# Patient Record
Sex: FEMALE | Race: WHITE | NOT HISPANIC OR LATINO | Employment: OTHER | URBAN - METROPOLITAN AREA
[De-identification: names, ages, dates, MRNs, and addresses within clinical notes are randomized per-mention and may not be internally consistent; named-entity substitution may affect disease eponyms.]

---

## 2017-02-23 ENCOUNTER — CONVERSION ENCOUNTER (OUTPATIENT)
Dept: MAMMOGRAPHY | Facility: HOSPITAL | Age: 72
End: 2017-02-23

## 2018-03-08 ENCOUNTER — CONVERSION ENCOUNTER (OUTPATIENT)
Dept: MAMMOGRAPHY | Facility: HOSPITAL | Age: 73
End: 2018-03-08

## 2019-03-26 ENCOUNTER — HOSPITAL ENCOUNTER (OUTPATIENT)
Dept: MAMMOGRAPHY | Facility: HOSPITAL | Age: 74
Discharge: HOME | End: 2019-03-26
Attending: NURSE PRACTITIONER

## 2022-05-07 ENCOUNTER — HOSPITAL ENCOUNTER (EMERGENCY)
Facility: HOSPITAL | Age: 77
Discharge: HOME | End: 2022-05-08
Attending: EMERGENCY MEDICINE | Admitting: EMERGENCY MEDICINE

## 2022-05-07 DIAGNOSIS — M25.551 RIGHT HIP PAIN: Primary | ICD-10-CM

## 2022-05-07 PROCEDURE — 99283 EMERGENCY DEPT VISIT LOW MDM: CPT

## 2022-05-08 ENCOUNTER — APPOINTMENT (OUTPATIENT)
Dept: GENERAL RADIOLOGY | Facility: HOSPITAL | Age: 77
End: 2022-05-08

## 2022-05-08 VITALS
OXYGEN SATURATION: 95 % | RESPIRATION RATE: 20 BRPM | HEART RATE: 99 BPM | HEIGHT: 59 IN | WEIGHT: 90.17 LBS | SYSTOLIC BLOOD PRESSURE: 122 MMHG | BODY MASS INDEX: 18.18 KG/M2 | DIASTOLIC BLOOD PRESSURE: 76 MMHG | TEMPERATURE: 98 F

## 2022-05-08 PROCEDURE — 73502 X-RAY EXAM HIP UNI 2-3 VIEWS: CPT

## 2022-05-08 PROCEDURE — 96372 THER/PROPH/DIAG INJ SC/IM: CPT

## 2022-05-08 PROCEDURE — 25010000002 METHYLPREDNISOLONE PER 125 MG: Performed by: NURSE PRACTITIONER

## 2022-05-08 RX ORDER — CYCLOBENZAPRINE HCL 10 MG
10 TABLET ORAL ONCE
Status: COMPLETED | OUTPATIENT
Start: 2022-05-08 | End: 2022-05-08

## 2022-05-08 RX ORDER — METHYLPREDNISOLONE SODIUM SUCCINATE 125 MG/2ML
125 INJECTION, POWDER, LYOPHILIZED, FOR SOLUTION INTRAMUSCULAR; INTRAVENOUS ONCE
Status: COMPLETED | OUTPATIENT
Start: 2022-05-08 | End: 2022-05-08

## 2022-05-08 RX ORDER — METHOCARBAMOL 500 MG/1
500 TABLET, FILM COATED ORAL 3 TIMES DAILY PRN
Qty: 15 TABLET | Refills: 0 | Status: ON HOLD | OUTPATIENT
Start: 2022-05-08 | End: 2023-01-10

## 2022-05-08 RX ORDER — ACETAMINOPHEN 325 MG/1
650 TABLET ORAL ONCE
Status: COMPLETED | OUTPATIENT
Start: 2022-05-08 | End: 2022-05-08

## 2022-05-08 RX ORDER — LIDOCAINE 50 MG/G
1 PATCH TOPICAL EVERY 24 HOURS
Qty: 15 EACH | Refills: 0 | Status: ON HOLD | OUTPATIENT
Start: 2022-05-08 | End: 2023-01-10

## 2022-05-08 RX ADMIN — METHYLPREDNISOLONE SODIUM SUCCINATE 125 MG: 125 INJECTION, POWDER, FOR SOLUTION INTRAMUSCULAR; INTRAVENOUS at 01:10

## 2022-05-08 RX ADMIN — ACETAMINOPHEN 650 MG: 325 TABLET ORAL at 01:10

## 2022-05-08 RX ADMIN — CYCLOBENZAPRINE 10 MG: 10 TABLET, FILM COATED ORAL at 01:10

## 2022-05-08 NOTE — ED PROVIDER NOTES
Time: 01:03 EDT  Arrived by: Vehicle  Chief Complaint: Right hip pain  History provided by: Patient and family member  History is limited by: N/A    History of Present Illness:  Patient is a 77 y.o. year old female that presents to the emergency department with right hip pain with no new injury      Hip Pain  Location:  Right  Quality:  Aching and throbbing  Severity:  Moderate  Onset quality:  Gradual  Duration:  2 weeks  Timing:  Constant  Progression:  Worsening  Chronicity:  New  Context:  Patient had left hip pain and was seen and given injections.  That pain seemed to ease up but now right is hurting  Relieved by:  Sitting or lying down  Worsened by:  Standing up or touching  Ineffective treatments:  Patient states she took some naproxen and Aleve but then her primary doctor told her that was an NSAID and she did not need to be taking  Associated symptoms: no abdominal pain, no chest pain, no fever, no myalgias, no nausea, no rash, no shortness of breath and no vomiting          Similar Symptoms Previously: Had left hip pain a few weeks ago  Recently seen: Patient was seen by PCP 2 weeks ago for left hip pain after moving a couch.  Was given a shot and put on Flexeril for a few days.  Seem to improve but now right hip is hurting      Patient Care Team  Primary Care Provider: Unknown    Past Medical History:     No Known Allergies  History reviewed. No pertinent past medical history.  History reviewed. No pertinent surgical history.  History reviewed. No pertinent family history.    Home Medications:  Prior to Admission medications    Not on File        Social History:   PT  has no history on file for tobacco use, alcohol use, and drug use.    Record Review:  I have reviewed the patient's records in [place].     Review of Systems  Review of Systems   Constitutional: Negative for fever.   Respiratory: Negative for shortness of breath.    Cardiovascular: Negative for chest pain.   Gastrointestinal: Negative for  "abdominal pain, nausea and vomiting.   Genitourinary: Negative for flank pain.   Musculoskeletal: Positive for arthralgias ( Right hip) and gait problem. Negative for back pain, joint swelling, myalgias and neck pain.   Skin: Negative for rash.   Neurological: Negative for weakness and numbness.   Hematological: Negative.    Psychiatric/Behavioral: Negative.         Physical Exam  /76 (BP Location: Left arm, Patient Position: Sitting)   Pulse 104   Temp 98 °F (36.7 °C) (Oral)   Resp 20   Ht 149.9 cm (59\")   Wt 40.9 kg (90 lb 2.7 oz)   SpO2 96%   BMI 18.21 kg/m²     Physical Exam  Vitals and nursing note reviewed.   Constitutional:       Appearance: Normal appearance.   HENT:      Head: Atraumatic.   Cardiovascular:      Pulses: Normal pulses.   Pulmonary:      Effort: Pulmonary effort is normal.   Abdominal:      Tenderness: There is no abdominal tenderness.   Musculoskeletal:         General: Tenderness ( Tenderness both anterior right hip and posterior right hip) present. No swelling.      Cervical back: Normal range of motion.      Comments: Full range of motion of right hip   Skin:     General: Skin is warm and dry.      Capillary Refill: Capillary refill takes less than 2 seconds.      Findings: No erythema or rash.   Neurological:      Mental Status: She is alert.      Sensory: No sensory deficit.      Motor: No weakness.      Gait: Gait abnormal ( Limping).   Psychiatric:         Mood and Affect: Mood normal.         Behavior: Behavior normal.          ED Course  /76 (BP Location: Left arm, Patient Position: Sitting)   Pulse 104   Temp 98 °F (36.7 °C) (Oral)   Resp 20   Ht 149.9 cm (59\")   Wt 40.9 kg (90 lb 2.7 oz)   SpO2 96%   BMI 18.21 kg/m²   No results found for this or any previous visit.  Medications   methylPREDNISolone sodium succinate (SOLU-Medrol) injection 125 mg (has no administration in time range)   cyclobenzaprine (FLEXERIL) tablet 10 mg (has no administration in time " range)   acetaminophen (TYLENOL) tablet 650 mg (has no administration in time range)     XR Hip With or Without Pelvis 2 - 3 View Right    Result Date: 5/8/2022  Narrative: PROCEDURE: XR HIP W OR WO PELVIS 2-3 VIEW RIGHT  COMPARISON: None.  INDICATIONS: right hip pain after lifting injury 1 week ago  FINDINGS: 3 views were obtained.  No acute fracture or acute malalignment is identified.  There is suspected generalized osteopenia.  There are pelvic vascular calcifications.  There may be mild degenerative changes of the bilateral hip joints.  If symptoms or clinical concerns persist, consider imaging follow-up.      Impression:  No acute fracture or acute malalignment is identified.    COMMENT:  Part of this note is an electronic transcription of spoken language to printed text. The electronic translation/transcription may permit erroneous, or at times, nonsensical (or even sensical) words or phrases to be inadvertently transcribed or omitted; this  has reviewed the note for such errors (as well as additional errors); however, some may still exist.  ADIS GRAVES JR, MD       Electronically Signed and Approved By: ADIS GRAVES JR, MD on 5/08/2022 at 0:32                Medical Decision Making:                     MDM  Number of Diagnoses or Management Options  Right hip pain  Diagnosis management comments: I have spoken with the patient. I have explained the patient´s condition, diagnoses and treatment plan based on the information available to me at this time. I have answered the patient's questions and addressed any concerns. The patient has a good  understanding of the patient´s diagnosis, condition, and treatment plan as can be expected at this point. The vital signs have been stable. The patient´s condition is stable and appropriate for discharge from the emergency department.      The patient will pursue further outpatient evaluation with the primary care physician or other designated or  consulting physician as outlined in the discharge instructions. They are agreeable to this plan of care and follow-up instructions have been explained in detail. The patient has received these instructions in written format and have expressed an understanding of the discharge instructions. The patient is aware that any significant change in condition or worsening of symptoms should prompt an immediate return to this or the closest emergency department or call to 911.         Amount and/or Complexity of Data Reviewed  Tests in the radiology section of CPT®: reviewed and ordered  Tests in the medicine section of CPT®: ordered and reviewed  Obtain history from someone other than the patient: yes (Family member at bedside)    Risk of Complications, Morbidity, and/or Mortality  Presenting problems: low  Diagnostic procedures: low  Management options: low    Patient Progress  Patient progress: stable       Final diagnoses:   Right hip pain        Disposition:  ED Disposition     ED Disposition   Discharge    Condition   Stable    Comment   --              Elisabeth Membreno, APRN  05/08/22 0103

## 2022-05-08 NOTE — DISCHARGE INSTRUCTIONS
Your x-rays were negative and only showed mild degenerative changes.  The pain in your right hip as we discussed is likely due for compensation due to recent left hip pain.    Rest.  Ice.  Take medication as prescribed.  Tylenol as needed for pain.    Use your cane for ambulation assistance.    Follow-up with your PCP or the orthopedic as referred above for further evaluation and any additional treatment or evaluation needed.    Return for new or worsening symptoms

## 2022-05-25 ENCOUNTER — TELEPHONE (OUTPATIENT)
Dept: ORTHOPEDIC SURGERY | Facility: CLINIC | Age: 77
End: 2022-05-25

## 2022-05-26 ENCOUNTER — TELEPHONE (OUTPATIENT)
Dept: ORTHOPEDIC SURGERY | Facility: CLINIC | Age: 77
End: 2022-05-26

## 2022-06-01 ENCOUNTER — TELEPHONE (OUTPATIENT)
Dept: ORTHOPEDIC SURGERY | Facility: CLINIC | Age: 77
End: 2022-06-01

## 2022-06-01 NOTE — TELEPHONE ENCOUNTER
Caller: PAUL MORALES    Relationship to patient: Emergency Contact    Best call back number:   Patient is needing: PLEASE CALL THE PATIENTS DAUGHTER IN REFERENCES TO HER MOTHERS REFERRAL. PATIENT IS IN PAIN. SHE IS WILLING TO SEE ANY DOCTOR AT THIS POINT.

## 2022-06-02 ENCOUNTER — TELEPHONE (OUTPATIENT)
Dept: ORTHOPEDIC SURGERY | Facility: CLINIC | Age: 77
End: 2022-06-02

## 2022-06-02 NOTE — TELEPHONE ENCOUNTER
Caller: PATIENT   Relationship to patient: SELF     Best call back number: [phone]    Patient is needing: PATIENT IS ASKING IF SHE COULD GET IN SOONER. SHE HAS A FRACTURE AND STATES SHE IS IN A LOT OF PAIN. SCHEDULED PER REFERRAL NOTES. ATTEMPTED TO WT BUT THERE WAS NO ANSWER.

## 2022-06-02 NOTE — TELEPHONE ENCOUNTER
DR GARLAND SAID OK. PT STATES SHE INJURED HER LEFT SIDE IN April MOVING A COUCH THEN THE RT SIDE STARTED HURTING. REFERRING PROVIDER IS WANTING SOONER APPT WILL DR CIFUENTES SEE?

## 2022-06-07 ENCOUNTER — OFFICE VISIT (OUTPATIENT)
Dept: ORTHOPEDIC SURGERY | Facility: CLINIC | Age: 77
End: 2022-06-07

## 2022-06-07 VITALS — HEART RATE: 109 BPM | WEIGHT: 90 LBS | OXYGEN SATURATION: 96 % | BODY MASS INDEX: 18.14 KG/M2 | HEIGHT: 59 IN

## 2022-06-07 DIAGNOSIS — S32.409A CLOSED NONDISPLACED FRACTURE OF ACETABULUM, UNSPECIFIED PORTION OF ACETABULUM, UNSPECIFIED LATERALITY, INITIAL ENCOUNTER: Primary | ICD-10-CM

## 2022-06-07 PROBLEM — S72.001A CLOSED FRACTURE OF RIGHT HIP: Status: ACTIVE | Noted: 2022-06-07

## 2022-06-07 PROCEDURE — 99203 OFFICE O/P NEW LOW 30 MIN: CPT | Performed by: ORTHOPAEDIC SURGERY

## 2022-06-07 RX ORDER — ATORVASTATIN CALCIUM 40 MG/1
40 TABLET, FILM COATED ORAL DAILY
COMMUNITY

## 2022-06-07 RX ORDER — BUSPIRONE HYDROCHLORIDE 10 MG/1
10 TABLET ORAL 3 TIMES DAILY
COMMUNITY
Start: 2022-05-19

## 2022-06-07 RX ORDER — ACETAMINOPHEN 500 MG
500 TABLET ORAL EVERY 6 HOURS PRN
COMMUNITY

## 2022-06-07 RX ORDER — LISINOPRIL 20 MG/1
20 TABLET ORAL DAILY
COMMUNITY
Start: 2022-06-03

## 2022-06-07 RX ORDER — METOPROLOL SUCCINATE 25 MG/1
25 TABLET, EXTENDED RELEASE ORAL DAILY
COMMUNITY
Start: 2022-05-19

## 2022-06-07 RX ORDER — CYCLOBENZAPRINE HCL 10 MG
10 TABLET ORAL NIGHTLY PRN
COMMUNITY
Start: 2022-05-05

## 2022-06-07 NOTE — PROGRESS NOTES
"Chief Complaint  Pain and Initial Evaluation of the Right Hip     Subjective      Sandra Sylvester presents to [facility] for evaluation of the right hip. She reports she was moving furniture and started having right hip pain. She has had x-rays and MRI of her hip. I reviewed those with her today. She has no other complaints. She has been trying to rest her hip.     No Known Allergies     Social History     Socioeconomic History   • Marital status:    Tobacco Use   • Smoking status: Current Every Day Smoker     Packs/day: 0.50     Types: Cigarettes   • Smokeless tobacco: Never Used        Review of Systems     Objective   Vital Signs:   Pulse 109   Ht 149.9 cm (59\")   Wt 40.8 kg (90 lb)   SpO2 96%   BMI 18.18 kg/m²       Physical Exam  Constitutional:       Appearance: Normal appearance. The patient is well-developed and normal weight.   HENT:      Head: Normocephalic.      Right Ear: Hearing and external ear normal.      Left Ear: Hearing and external ear normal.      Nose: Nose normal.   Eyes:      Conjunctiva/sclera: Conjunctivae normal.   Cardiovascular:      Rate and Rhythm: Normal rate.   Pulmonary:      Effort: Pulmonary effort is normal.      Breath sounds: No wheezing or rales.   Abdominal:      Palpations: Abdomen is soft.      Tenderness: There is no abdominal tenderness.   Musculoskeletal:      Cervical back: Normal range of motion.   Skin:     Findings: No rash.   Neurological:      Mental Status: The patient is alert and oriented to person, place, and time.   Psychiatric:         Mood and Affect: Mood and affect normal.         Judgment: Judgment normal.       Ortho Exam      Right hip-flexion 90. External Rotation 35. Internal rotation 45. Negative straight leg raise. Equal leg lengths. Positive EHL, FHL, GS and TA. Sensation intact to all 5 nerves of the foot. Positive pulses. Pain with hip ROM. No pain with hip ROM to the left hip. "     Procedures    [facility] MRI- IMPRESSION: Extensive abnormal marrow signal about the pelvis involving both iliac bones and both acetabula compatible with   diffuse bone marrow edema. Edema also extends into the left superior pubic ramus.  Extensive microtrabecular fracture as well as cortical fracture along the anterior margin of the right acetabulum. Cortical fracture   displaced up to 3 mm at the anterior weightbearing acetabulum with a moderate amount of periarticular edema.  Similar but less pronounced microtrabecular fracture of the left acetabulum and also along the medial margin of the left iliac bone   bordering the SI joint.  There is a small (2.2 cm) focus of increased T2 marrow signal within the posterior aspect of the subtrochanteric right femur could   represent a focus of stress change but no definitive fracture.  Degenerative disc disease and moderately advanced facet arthropathy lower lumbar spine.  Findings called and discussed with Marilou STAPLES at the time of dictation    Imaging Results (Most Recent)     None           Result Review :       No results found.           Assessment and Plan     Diagnoses and all orders for this visit:    1. Closed nondisplaced fracture of acetabulum, unspecified portion of acetabulum, Bilaterally, initial encounter ([facility]) (Primary)        Discussed the treatment plan with the patient.  I reviewed the MRI with the patient. I advised her for limited weight bearing. Plan to use her walker. Order for home health given today.     Call or return if worsening symptoms.    Follow Up     4-6 weeks with AP Pelvis X-rays      Patient was given instructions and counseling regarding her condition or for health maintenance advice. Please see specific information pulled into the AVS if appropriate.     Scribed for Rodríguez Everett MD by Bela Valentine.  06/07/22   10:44 EDT    I have personally performed the services described in this document as scribed by the  above individual and it is both accurate and complete. Rodríguez Everett MD 06/07/22

## 2022-06-09 ENCOUNTER — TELEPHONE (OUTPATIENT)
Dept: ORTHOPEDIC SURGERY | Facility: CLINIC | Age: 77
End: 2022-06-09

## 2022-06-09 DIAGNOSIS — S32.409A CLOSED NONDISPLACED FRACTURE OF ACETABULUM, UNSPECIFIED PORTION OF ACETABULUM, UNSPECIFIED LATERALITY, INITIAL ENCOUNTER: Primary | ICD-10-CM

## 2022-06-09 NOTE — TELEPHONE ENCOUNTER
Caller: PAUL  Relationship to Patient: SELF    Phone Number: [phone]  Reason for Call:PT DAUGHTER STATES THAT PT WOULD LIKE TO MOVE FORWARD WITH PHYSICAL THERAPY IN HER HOME. STATES THAT SHE WANTS TO DO THIS WITH [facility] AS THEY HAVE AN AT HOME PROGRAM. PLEASE ADVISE AT ABOVE PHONE NUMBER

## 2022-06-09 NOTE — TELEPHONE ENCOUNTER
Received call from Cristina at [facility] in [place]. Cristina states patient called her and told her she does not want anyone coming to her house for therapy. Called to clarify with the patient's daughter and they have now decided to do outpatient PT at [facility]. Orders faxed.

## 2022-08-01 ENCOUNTER — OFFICE VISIT (OUTPATIENT)
Dept: ORTHOPEDIC SURGERY | Facility: CLINIC | Age: 77
End: 2022-08-01

## 2022-08-01 VITALS — HEART RATE: 67 BPM | OXYGEN SATURATION: 95 % | BODY MASS INDEX: 18.14 KG/M2 | HEIGHT: 59 IN | WEIGHT: 90 LBS

## 2022-08-01 DIAGNOSIS — M25.551 RIGHT HIP PAIN: Primary | ICD-10-CM

## 2022-08-01 DIAGNOSIS — S32.409D CLOSED NONDISPLACED FRACTURE OF ACETABULUM WITH ROUTINE HEALING, UNSPECIFIED PORTION OF ACETABULUM, UNSPECIFIED LATERALITY, SUBSEQUENT ENCOUNTER: ICD-10-CM

## 2022-08-01 PROCEDURE — 99213 OFFICE O/P EST LOW 20 MIN: CPT | Performed by: PHYSICIAN ASSISTANT

## 2022-08-01 NOTE — PROGRESS NOTES
"Chief Complaint  Follow-up of the Right Hip    Subjective          Sandra Sylvester is a 77 y.o. female  presents to [facility] for   History of Present Illness      Patient presents with her daughter Melissa for follow-up evaluation of bilateral acetabular fracture, she was first seen by Dr. Everett on 6/7/2022, her original injury occurred sometime in April when she was moving furniture, started having right hip pain.  Prior to her seeing Dr. Everett she had x-rays and MRI of her hip.  Patient states that she has been doing physical therapy since last visit and protecting her weightbearing with using a walker.  Patient states that she is happy with her progress states the right hip pain has improved, left hip has also improved she states that it is expensive to go to physical therapy and she would like to continue home exercises.  She denies new injury or symptoms of pain.  Patient daughter agrees.      No Known Allergies     Social History     Socioeconomic History   • Marital status:    Tobacco Use   • Smoking status: Current Every Day Smoker     Packs/day: 0.50     Types: Cigarettes   • Smokeless tobacco: Never Used        REVIEW OF SYSTEMS    Constitutional: Denies fevers, chills, weight loss  Cardiovascular: Denies chest pain, shortness of breath  Skin: Denies rashes, acute skin changes  Neurologic: Denies headache, loss of consciousness  MSK: Bilateral hip pain      Objective   Vital Signs:   Pulse 67   Ht 149.9 cm (59\")   Wt 40.8 kg (90 lb)   SpO2 95%   BMI 18.18 kg/m²     Body mass index is 18.18 kg/m².    Physical Exam    Bilateral hip: Right hip: Flexion 90, external rotation 35, internal rotation 45, negative straight leg raise, equal leg lengths.  Left hip: Flexion 90, external rotation 35, internal rotation 45, no pain with range of motion.    Procedures    Imaging Results (Most Recent)     Procedure Component Value Units Date/Time    XR Pelvis 1 or 2 " View [[id number]] Resulted: 08/01/22 1532     Updated: 08/01/22 1533    Narrative:      • View:AP view(s)  • Site: Pelvis  • Indication: Pelvic pain  • Study: X-rays ordered, taken in the office, and reviewed today  • X-ray: Good healing of right and left acetabular fractures, fracture   alignment remains stable with callus formation,.  • Comparative data: No comparative data found             Result Review :   The following data was reviewed by: ELSIE Lim on 08/01/2022:  Data reviewed: Radiologic studies Reviewed by me with the patient             Assessment and Plan    Diagnoses and all orders for this visit:    1. Right hip pain (Primary)  -     Cancel: XR Hip With or Without Pelvis 2 - 3 View Right  -     XR Pelvis 1 or 2 View    2. Closed nondisplaced fracture of acetabulum with routine healing, unspecified portion of acetabulum, unspecified laterality, subsequent encounter        Reviewed x-rays with the patient and her daughter, advised them she should continue protective weightbearing with walker use, advised them to continue home exercises if they need to stop physical therapy, if any new or concerning symptoms occur follow-up sooner otherwise follow-up in 4 weeks with recheck with x-rays.    Call or return if worsening symptoms.    Follow Up   Return in about 4 weeks (around 8/29/2022) for Recheck.  Patient was given instructions and counseling regarding her condition or for health maintenance advice. Please see specific information pulled into the AVS if appropriate.

## 2022-09-01 ENCOUNTER — OFFICE VISIT (OUTPATIENT)
Dept: ORTHOPEDIC SURGERY | Facility: CLINIC | Age: 77
End: 2022-09-01

## 2022-09-01 VITALS — BODY MASS INDEX: 18.14 KG/M2 | HEIGHT: 59 IN | HEART RATE: 93 BPM | OXYGEN SATURATION: 93 % | WEIGHT: 90 LBS

## 2022-09-01 DIAGNOSIS — S32.409D CLOSED NONDISPLACED FRACTURE OF ACETABULUM WITH ROUTINE HEALING, UNSPECIFIED PORTION OF ACETABULUM, UNSPECIFIED LATERALITY, SUBSEQUENT ENCOUNTER: ICD-10-CM

## 2022-09-01 DIAGNOSIS — M25.551 RIGHT HIP PAIN: Primary | ICD-10-CM

## 2022-09-01 PROCEDURE — 99213 OFFICE O/P EST LOW 20 MIN: CPT | Performed by: ORTHOPAEDIC SURGERY

## 2022-09-01 NOTE — PROGRESS NOTES
"Chief Complaint  Follow-up of the Pelvis     Subjective      Sandra Sylvester presents to [facility] for follow-up evaluation of bilateral acetabular fracture, she was first seen by Dr. Everett on 6/7/2022, her original injury occurred sometime in April when she was moving furniture, started having right hip pain. She denies hip pain. She has back pain. She ambulates with a walker.     No Known Allergies     Social History     Socioeconomic History   • Marital status:    Tobacco Use   • Smoking status: Current Every Day Smoker     Packs/day: 0.50     Types: Cigarettes   • Smokeless tobacco: Never Used        Review of Systems     Objective   Vital Signs:   Pulse 93   Ht 149.9 cm (59\")   Wt 40.8 kg (90 lb)   SpO2 93%   BMI 18.18 kg/m²       Physical Exam  Constitutional:       Appearance: Normal appearance. The patient is well-developed and normal weight.   HENT:      Head: Normocephalic.      Right Ear: Hearing and external ear normal.      Left Ear: Hearing and external ear normal.      Nose: Nose normal.   Eyes:      Conjunctiva/sclera: Conjunctivae normal.   Cardiovascular:      Rate and Rhythm: Normal rate.   Pulmonary:      Effort: Pulmonary effort is normal.      Breath sounds: No wheezing or rales.   Abdominal:      Palpations: Abdomen is soft.      Tenderness: There is no abdominal tenderness.   Musculoskeletal:      Cervical back: Normal range of motion.   Skin:     Findings: No rash.   Neurological:      Mental Status: The patient is alert and oriented to person, place, and time.   Psychiatric:         Mood and Affect: Mood and affect normal.         Judgment: Judgment normal.       Ortho Exam      Pelvis- Right Hip flexion 95. External Rotation 45. Internal rotation 25. Neurovascularly intact. Positive EHL, FHL, GS and TA. Sensation intact to all 5 nerves of the foot. Positive pulses.     Procedures    X-Ray Report:  Pelvis X-Ray  Indication: Evaluation of " pelvis pain  AP view(s)  Findings: Healed right and left acetabular fractures, fracture   alignment remains stable with callus formation. Moderate to advanced degenerative changes of the right hip with mild protrusio of the femoral head. Moderate degenerative cahgnes of the left hip, osteopenia. No acute fracture  Prior studies available for comparison: yes         Imaging Results (Most Recent)     Procedure Component Value Units Date/Time    XR Pelvis 1 or 2 View [[id number]] Resulted: 09/01/22 1430     Updated: 09/01/22 1431           Result Review :       No results found.           Assessment and Plan     Diagnoses and all orders for this visit:    1. Right hip pain (Primary)  -     XR Pelvis 1 or 2 View    2. Closed nondisplaced fracture of acetabulum with routine healing, unspecified portion of acetabulum, unspecified laterality, subsequent encounter        Discussed the treatment plan with the patient.  I reviewed the x-rays that were obtained today with the patient. Plan to discontinue walker as tolerated. She can resume activity as tolerated.     Call or return if worsening symptoms.    Follow Up     PRN      Patient was given instructions and counseling regarding her condition or for health maintenance advice. Please see specific information pulled into the AVS if appropriate.     Scribed for Rodríguez Everett MD by Bela Valentine.  09/01/22   14:33 EDT    I have personally performed the services described in this document as scribed by the above individual and it is both accurate and complete. Rodríguez Everett MD 09/01/22

## 2023-01-01 ENCOUNTER — APPOINTMENT (OUTPATIENT)
Dept: GENERAL RADIOLOGY | Facility: HOSPITAL | Age: 78
DRG: 186 | End: 2023-01-01
Payer: MEDICARE

## 2023-01-01 ENCOUNTER — APPOINTMENT (OUTPATIENT)
Dept: CT IMAGING | Facility: HOSPITAL | Age: 78
DRG: 186 | End: 2023-01-01
Payer: MEDICARE

## 2023-01-01 ENCOUNTER — APPOINTMENT (OUTPATIENT)
Dept: CARDIOLOGY | Facility: HOSPITAL | Age: 78
DRG: 186 | End: 2023-01-01
Payer: MEDICARE

## 2023-01-01 ENCOUNTER — READMISSION MANAGEMENT (OUTPATIENT)
Dept: CALL CENTER | Facility: HOSPITAL | Age: 78
End: 2023-01-01
Payer: MEDICARE

## 2023-01-01 ENCOUNTER — HOSPITAL ENCOUNTER (INPATIENT)
Facility: HOSPITAL | Age: 78
LOS: 5 days | DRG: 186 | End: 2023-08-20
Attending: EMERGENCY MEDICINE | Admitting: INTERNAL MEDICINE
Payer: MEDICARE

## 2023-01-01 VITALS
TEMPERATURE: 97.6 F | OXYGEN SATURATION: 78 % | RESPIRATION RATE: 16 BRPM | HEIGHT: 59 IN | WEIGHT: 69.44 LBS | BODY MASS INDEX: 14 KG/M2

## 2023-01-01 DIAGNOSIS — R26.2 DIFFICULTY WALKING: ICD-10-CM

## 2023-01-01 DIAGNOSIS — J93.9 PNEUMOTHORAX, UNSPECIFIED TYPE: ICD-10-CM

## 2023-01-01 DIAGNOSIS — J90 PLEURAL EFFUSION: Primary | ICD-10-CM

## 2023-01-01 LAB
ALBUMIN FLD-MCNC: 1.3 G/DL
ALBUMIN SERPL-MCNC: 2.8 G/DL (ref 3.5–5.2)
ALBUMIN/GLOB SERPL: 1.1 G/DL
ALP SERPL-CCNC: 118 U/L (ref 39–117)
ALT SERPL W P-5'-P-CCNC: 10 U/L (ref 1–33)
ANION GAP SERPL CALCULATED.3IONS-SCNC: 11.3 MMOL/L (ref 5–15)
ANION GAP SERPL CALCULATED.3IONS-SCNC: 14.2 MMOL/L (ref 5–15)
ANION GAP SERPL CALCULATED.3IONS-SCNC: 7.7 MMOL/L (ref 5–15)
ANION GAP SERPL CALCULATED.3IONS-SCNC: 9.5 MMOL/L (ref 5–15)
ANISOCYTOSIS BLD QL: NORMAL
APPEARANCE FLD: ABNORMAL
APPEARANCE FLD: ABNORMAL
APTT PPP: 131.1 SECONDS (ref 78–95.9)
APTT PPP: 191.6 SECONDS (ref 78–95.9)
APTT PPP: 31.6 SECONDS (ref 78–95.9)
APTT PPP: 97.2 SECONDS (ref 78–95.9)
APTT PPP: >200 SECONDS (ref 78–95.9)
AST SERPL-CCNC: 22 U/L (ref 1–32)
B PARAPERT DNA SPEC QL NAA+PROBE: NOT DETECTED
B PERT DNA SPEC QL NAA+PROBE: NOT DETECTED
BACTERIA FLD CULT: NORMAL
BACTERIA FLD CULT: NORMAL
BASOPHILS # BLD AUTO: 0.02 10*3/MM3 (ref 0–0.2)
BASOPHILS # BLD AUTO: 0.04 10*3/MM3 (ref 0–0.2)
BASOPHILS # BLD AUTO: 0.04 10*3/MM3 (ref 0–0.2)
BASOPHILS # BLD AUTO: 0.06 10*3/MM3 (ref 0–0.2)
BASOPHILS NFR BLD AUTO: 0.3 % (ref 0–1.5)
BASOPHILS NFR BLD AUTO: 0.5 % (ref 0–1.5)
BASOPHILS NFR BLD AUTO: 0.7 % (ref 0–1.5)
BASOPHILS NFR BLD AUTO: 1.1 % (ref 0–1.5)
BH CV ECHO MEAS - AO MEAN PG: 1.63 MMHG
BH CV ECHO MEAS - AO ROOT DIAM: 3.3 CM
BH CV ECHO MEAS - AO V2 VTI: 15.1 CM
BH CV ECHO MEAS - AVA(I,D): 2.31 CM2
BH CV ECHO MEAS - EDV(CUBED): 20.8 ML
BH CV ECHO MEAS - EDV(MOD-SP2): 36.8 ML
BH CV ECHO MEAS - EDV(MOD-SP4): 44.9 ML
BH CV ECHO MEAS - EF(MOD-BP): 54.6 %
BH CV ECHO MEAS - EF(MOD-SP2): 51.4 %
BH CV ECHO MEAS - EF(MOD-SP4): 55 %
BH CV ECHO MEAS - ESV(CUBED): 7.6 ML
BH CV ECHO MEAS - ESV(MOD-SP2): 17.9 ML
BH CV ECHO MEAS - ESV(MOD-SP4): 20.2 ML
BH CV ECHO MEAS - FS: 28.7 %
BH CV ECHO MEAS - IVS/LVPW: 1.04 CM
BH CV ECHO MEAS - IVSD: 0.73 CM
BH CV ECHO MEAS - LA DIMENSION: 2.1 CM
BH CV ECHO MEAS - LAT PEAK E' VEL: 7.8 CM/SEC
BH CV ECHO MEAS - LV MASS(C)D: 44.4 GRAMS
BH CV ECHO MEAS - LV MAX PG: 2 MMHG
BH CV ECHO MEAS - LV MEAN PG: 0.95 MMHG
BH CV ECHO MEAS - LV V1 MAX: 70.7 CM/SEC
BH CV ECHO MEAS - LV V1 VTI: 11.4 CM
BH CV ECHO MEAS - LVIDD: 2.8 CM
BH CV ECHO MEAS - LVIDS: 1.96 CM
BH CV ECHO MEAS - LVOT AREA: 3.1 CM2
BH CV ECHO MEAS - LVOT DIAM: 1.97 CM
BH CV ECHO MEAS - LVPWD: 0.71 CM
BH CV ECHO MEAS - MED PEAK E' VEL: 3.9 CM/SEC
BH CV ECHO MEAS - MV A MAX VEL: 84.8 CM/SEC
BH CV ECHO MEAS - MV DEC TIME: 200 MSEC
BH CV ECHO MEAS - MV E MAX VEL: 59.6 CM/SEC
BH CV ECHO MEAS - MV E/A: 0.7
BH CV ECHO MEAS - RAP SYSTOLE: 3 MMHG
BH CV ECHO MEAS - RVDD: 2.07 CM
BH CV ECHO MEAS - RVSP: 42.5 MMHG
BH CV ECHO MEAS - SV(LVOT): 34.9 ML
BH CV ECHO MEAS - SV(MOD-SP2): 18.9 ML
BH CV ECHO MEAS - SV(MOD-SP4): 24.7 ML
BH CV ECHO MEAS - TAPSE (>1.6): 1.79 CM
BH CV ECHO MEAS - TR MAX PG: 39.5 MMHG
BH CV ECHO MEAS - TR MAX VEL: 314.1 CM/SEC
BH CV ECHO MEASUREMENTS AVERAGE E/E' RATIO: 10.19
BH CV UPPER ARTERIAL LEFT 1ST DIGIT SYS MAX: 0
BH CV UPPER ARTERIAL LEFT 2ND DIGIT SYS MAX: 0
BH CV UPPER ARTERIAL LEFT FBI 1ST DIGIT RATIO: 0
BH CV UPPER ARTERIAL LEFT FBI 2ND DIGIT RATIO: 0
BH CV UPPER ARTERIAL LEFT WBI RATIO: 0
BH CV UPPER ARTERIAL RIGHT 1ST DIGIT SYS MAX: 75
BH CV UPPER ARTERIAL RIGHT 2ND DIGIT SYS MAX: 66
BH CV UPPER ARTERIAL RIGHT FBI 1ST DIGIT RATIO: 0.97
BH CV UPPER ARTERIAL RIGHT FBI 2ND DIGIT RATIO: 0.86
BH CV UPPER ARTERIAL RIGHT WBI RATIO: 0.97
BILIRUB SERPL-MCNC: 0.7 MG/DL (ref 0–1.2)
BUN SERPL-MCNC: 23 MG/DL (ref 8–23)
BUN SERPL-MCNC: 24 MG/DL (ref 8–23)
BUN SERPL-MCNC: 27 MG/DL (ref 8–23)
BUN SERPL-MCNC: 27 MG/DL (ref 8–23)
BUN/CREAT SERPL: 34.8 (ref 7–25)
BUN/CREAT SERPL: 34.8 (ref 7–25)
BUN/CREAT SERPL: 36.5 (ref 7–25)
BUN/CREAT SERPL: 37 (ref 7–25)
C PNEUM DNA NPH QL NAA+NON-PROBE: NOT DETECTED
CALCIUM SPEC-SCNC: 10.3 MG/DL (ref 8.6–10.5)
CALCIUM SPEC-SCNC: 10.4 MG/DL (ref 8.6–10.5)
CALCIUM SPEC-SCNC: 10.4 MG/DL (ref 8.6–10.5)
CALCIUM SPEC-SCNC: 9.7 MG/DL (ref 8.6–10.5)
CHLORIDE SERPL-SCNC: 101 MMOL/L (ref 98–107)
CHLORIDE SERPL-SCNC: 101 MMOL/L (ref 98–107)
CHLORIDE SERPL-SCNC: 108 MMOL/L (ref 98–107)
CHLORIDE SERPL-SCNC: 93 MMOL/L (ref 98–107)
CHOLESTEROL FLUID: 52 MG/DL
CO2 SERPL-SCNC: 21.7 MMOL/L (ref 22–29)
CO2 SERPL-SCNC: 23.3 MMOL/L (ref 22–29)
CO2 SERPL-SCNC: 26.5 MMOL/L (ref 22–29)
CO2 SERPL-SCNC: 26.8 MMOL/L (ref 22–29)
COLOR FLD: ABNORMAL
COLOR FLD: ABNORMAL
CREAT SERPL-MCNC: 0.66 MG/DL (ref 0.57–1)
CREAT SERPL-MCNC: 0.69 MG/DL (ref 0.57–1)
CREAT SERPL-MCNC: 0.73 MG/DL (ref 0.57–1)
CREAT SERPL-MCNC: 0.74 MG/DL (ref 0.57–1)
D-LACTATE SERPL-SCNC: 3 MMOL/L (ref 0.5–2)
D-LACTATE SERPL-SCNC: 3 MMOL/L (ref 0.5–2)
D-LACTATE SERPL-SCNC: 3.2 MMOL/L (ref 0.5–2)
D-LACTATE SERPL-SCNC: 3.5 MMOL/L (ref 0.5–2)
D-LACTATE SERPL-SCNC: 3.7 MMOL/L (ref 0.5–2)
D-LACTATE SERPL-SCNC: 3.8 MMOL/L (ref 0.5–2)
DEPRECATED RDW RBC AUTO: 59.5 FL (ref 37–54)
DEPRECATED RDW RBC AUTO: 61.9 FL (ref 37–54)
DEPRECATED RDW RBC AUTO: 62.1 FL (ref 37–54)
DEPRECATED RDW RBC AUTO: 68.4 FL (ref 37–54)
EGFRCR SERPLBLD CKD-EPI 2021: 81.9 ML/MIN/1.73
EGFRCR SERPLBLD CKD-EPI 2021: 83.3 ML/MIN/1.73
EGFRCR SERPLBLD CKD-EPI 2021: 87.9 ML/MIN/1.73
EGFRCR SERPLBLD CKD-EPI 2021: 88.8 ML/MIN/1.73
EOSINOPHIL # BLD AUTO: 0.03 10*3/MM3 (ref 0–0.4)
EOSINOPHIL # BLD AUTO: 0.12 10*3/MM3 (ref 0–0.4)
EOSINOPHIL # BLD AUTO: 0.18 10*3/MM3 (ref 0–0.4)
EOSINOPHIL # BLD AUTO: 0.29 10*3/MM3 (ref 0–0.4)
EOSINOPHIL NFR BLD AUTO: 0.4 % (ref 0.3–6.2)
EOSINOPHIL NFR BLD AUTO: 1.5 % (ref 0.3–6.2)
EOSINOPHIL NFR BLD AUTO: 3.3 % (ref 0.3–6.2)
EOSINOPHIL NFR BLD AUTO: 5.1 % (ref 0.3–6.2)
ERYTHROCYTE [DISTWIDTH] IN BLOOD BY AUTOMATED COUNT: 16.9 % (ref 12.3–15.4)
ERYTHROCYTE [DISTWIDTH] IN BLOOD BY AUTOMATED COUNT: 17.1 % (ref 12.3–15.4)
ERYTHROCYTE [DISTWIDTH] IN BLOOD BY AUTOMATED COUNT: 17.1 % (ref 12.3–15.4)
ERYTHROCYTE [DISTWIDTH] IN BLOOD BY AUTOMATED COUNT: 17.2 % (ref 12.3–15.4)
FLUAV SUBTYP SPEC NAA+PROBE: NOT DETECTED
FLUBV RNA ISLT QL NAA+PROBE: NOT DETECTED
GLOBULIN UR ELPH-MCNC: 2.6 GM/DL
GLUCOSE FLD-MCNC: 107 MG/DL
GLUCOSE SERPL-MCNC: 103 MG/DL (ref 65–99)
GLUCOSE SERPL-MCNC: 77 MG/DL (ref 65–99)
GLUCOSE SERPL-MCNC: 83 MG/DL (ref 65–99)
GLUCOSE SERPL-MCNC: 93 MG/DL (ref 65–99)
GRAM STN SPEC: NORMAL
HADV DNA SPEC NAA+PROBE: NOT DETECTED
HCOV 229E RNA SPEC QL NAA+PROBE: NOT DETECTED
HCOV HKU1 RNA SPEC QL NAA+PROBE: NOT DETECTED
HCOV NL63 RNA SPEC QL NAA+PROBE: NOT DETECTED
HCOV OC43 RNA SPEC QL NAA+PROBE: NOT DETECTED
HCT VFR BLD AUTO: 38 % (ref 34–46.6)
HCT VFR BLD AUTO: 38.7 % (ref 34–46.6)
HCT VFR BLD AUTO: 40 % (ref 34–46.6)
HCT VFR BLD AUTO: 42.6 % (ref 34–46.6)
HGB BLD-MCNC: 11.6 G/DL (ref 12–15.9)
HGB BLD-MCNC: 12.3 G/DL (ref 12–15.9)
HGB BLD-MCNC: 12.4 G/DL (ref 12–15.9)
HGB BLD-MCNC: 13.1 G/DL (ref 12–15.9)
HMPV RNA NPH QL NAA+NON-PROBE: NOT DETECTED
HOLD SPECIMEN: NORMAL
HOLD SPECIMEN: NORMAL
HPIV1 RNA ISLT QL NAA+PROBE: NOT DETECTED
HPIV2 RNA SPEC QL NAA+PROBE: NOT DETECTED
HPIV3 RNA NPH QL NAA+PROBE: NOT DETECTED
HPIV4 P GENE NPH QL NAA+PROBE: NOT DETECTED
IMM GRANULOCYTES # BLD AUTO: 0.01 10*3/MM3 (ref 0–0.05)
IMM GRANULOCYTES # BLD AUTO: 0.02 10*3/MM3 (ref 0–0.05)
IMM GRANULOCYTES # BLD AUTO: 0.03 10*3/MM3 (ref 0–0.05)
IMM GRANULOCYTES # BLD AUTO: 0.04 10*3/MM3 (ref 0–0.05)
IMM GRANULOCYTES NFR BLD AUTO: 0.2 % (ref 0–0.5)
IMM GRANULOCYTES NFR BLD AUTO: 0.2 % (ref 0–0.5)
IMM GRANULOCYTES NFR BLD AUTO: 0.5 % (ref 0–0.5)
IMM GRANULOCYTES NFR BLD AUTO: 0.6 % (ref 0–0.5)
INR PPP: 1.25 (ref 0.86–1.15)
LDH FLD-CCNC: 363 U/L
LDH FLD-CCNC: 406 U/L
LDH SERPL-CCNC: 569 U/L (ref 135–214)
LEFT ATRIUM VOLUME INDEX: 12.2 ML/M2
LYMPHOCYTES # BLD AUTO: 0.35 10*3/MM3 (ref 0.7–3.1)
LYMPHOCYTES # BLD AUTO: 0.41 10*3/MM3 (ref 0.7–3.1)
LYMPHOCYTES # BLD AUTO: 0.43 10*3/MM3 (ref 0.7–3.1)
LYMPHOCYTES # BLD AUTO: 0.49 10*3/MM3 (ref 0.7–3.1)
LYMPHOCYTES NFR BLD AUTO: 5.2 % (ref 19.6–45.3)
LYMPHOCYTES NFR BLD AUTO: 6.5 % (ref 19.6–45.3)
LYMPHOCYTES NFR BLD AUTO: 7.1 % (ref 19.6–45.3)
LYMPHOCYTES NFR BLD AUTO: 7.2 % (ref 19.6–45.3)
LYMPHOCYTES NFR FLD MANUAL: 64 %
LYMPHOCYTES NFR FLD MANUAL: 89 %
Lab: NORMAL
M PNEUMO IGG SER IA-ACNC: NOT DETECTED
MACROCYTES BLD QL SMEAR: NORMAL
MACROPHAGE FLUID: 9 %
MCH RBC QN AUTO: 30.7 PG (ref 26.6–33)
MCH RBC QN AUTO: 31.8 PG (ref 26.6–33)
MCH RBC QN AUTO: 31.8 PG (ref 26.6–33)
MCH RBC QN AUTO: 31.9 PG (ref 26.6–33)
MCHC RBC AUTO-ENTMCNC: 29.1 G/DL (ref 31.5–35.7)
MCHC RBC AUTO-ENTMCNC: 30.5 G/DL (ref 31.5–35.7)
MCHC RBC AUTO-ENTMCNC: 31.8 G/DL (ref 31.5–35.7)
MCHC RBC AUTO-ENTMCNC: 32.8 G/DL (ref 31.5–35.7)
MCV RBC AUTO: 100.3 FL (ref 79–97)
MCV RBC AUTO: 100.5 FL (ref 79–97)
MCV RBC AUTO: 109.2 FL (ref 79–97)
MCV RBC AUTO: 97.1 FL (ref 79–97)
MESOTHL CELL NFR FLD MANUAL: 2 %
MONOCYTES # BLD AUTO: 0.7 10*3/MM3 (ref 0.1–0.9)
MONOCYTES # BLD AUTO: 0.74 10*3/MM3 (ref 0.1–0.9)
MONOCYTES # BLD AUTO: 0.87 10*3/MM3 (ref 0.1–0.9)
MONOCYTES # BLD AUTO: 0.87 10*3/MM3 (ref 0.1–0.9)
MONOCYTES NFR BLD AUTO: 10.6 % (ref 5–12)
MONOCYTES NFR BLD AUTO: 12.3 % (ref 5–12)
MONOCYTES NFR BLD AUTO: 12.6 % (ref 5–12)
MONOCYTES NFR BLD AUTO: 13.7 % (ref 5–12)
MONOCYTES NFR FLD: 19 %
NEUTROPHILS NFR BLD AUTO: 4.07 10*3/MM3 (ref 1.7–7)
NEUTROPHILS NFR BLD AUTO: 4.22 10*3/MM3 (ref 1.7–7)
NEUTROPHILS NFR BLD AUTO: 5.43 10*3/MM3 (ref 1.7–7)
NEUTROPHILS NFR BLD AUTO: 6.73 10*3/MM3 (ref 1.7–7)
NEUTROPHILS NFR BLD AUTO: 73.8 % (ref 42.7–76)
NEUTROPHILS NFR BLD AUTO: 75.6 % (ref 42.7–76)
NEUTROPHILS NFR BLD AUTO: 79 % (ref 42.7–76)
NEUTROPHILS NFR BLD AUTO: 82 % (ref 42.7–76)
NEUTROPHILS NFR FLD MANUAL: 8 %
NEUTROPHILS NFR FLD MANUAL: 9 %
NRBC BLD AUTO-RTO: 0 /100 WBC (ref 0–0.2)
NT-PROBNP SERPL-MCNC: 913.9 PG/ML (ref 0–1800)
NUC CELL # FLD: 333 /MM3
NUC CELL # FLD: 830 /MM3
PH FLD: 8 [PH]
PLATELET # BLD AUTO: 252 10*3/MM3 (ref 140–450)
PLATELET # BLD AUTO: 276 10*3/MM3 (ref 140–450)
PLATELET # BLD AUTO: 298 10*3/MM3 (ref 140–450)
PLATELET # BLD AUTO: 304 10*3/MM3 (ref 140–450)
PMV BLD AUTO: 9.5 FL (ref 6–12)
PMV BLD AUTO: 9.6 FL (ref 6–12)
PMV BLD AUTO: 9.6 FL (ref 6–12)
PMV BLD AUTO: 9.8 FL (ref 6–12)
POTASSIUM SERPL-SCNC: 4.3 MMOL/L (ref 3.5–5.2)
POTASSIUM SERPL-SCNC: 4.4 MMOL/L (ref 3.5–5.2)
POTASSIUM SERPL-SCNC: 4.7 MMOL/L (ref 3.5–5.2)
POTASSIUM SERPL-SCNC: 5.2 MMOL/L (ref 3.5–5.2)
PROT FLD-MCNC: 2.3 G/DL
PROT SERPL-MCNC: 4.8 G/DL (ref 6–8.5)
PROT SERPL-MCNC: 5.4 G/DL (ref 6–8.5)
PROTHROMBIN TIME: 15.8 SECONDS (ref 11.8–14.9)
QT INTERVAL: 303 MS
RBC # BLD AUTO: 3.78 10*6/MM3 (ref 3.77–5.28)
RBC # BLD AUTO: 3.86 10*6/MM3 (ref 3.77–5.28)
RBC # BLD AUTO: 3.9 10*6/MM3 (ref 3.77–5.28)
RBC # BLD AUTO: 4.12 10*6/MM3 (ref 3.77–5.28)
RBC # FLD AUTO: 3000 /MM3
RBC # FLD AUTO: 6000 /MM3
REF LAB TEST METHOD: NORMAL
RHINOVIRUS RNA SPEC NAA+PROBE: NOT DETECTED
RSV RNA NPH QL NAA+NON-PROBE: NOT DETECTED
SARS-COV-2 RNA RESP QL NAA+PROBE: NOT DETECTED
SMALL PLATELETS BLD QL SMEAR: ADEQUATE
SODIUM SERPL-SCNC: 134 MMOL/L (ref 136–145)
SODIUM SERPL-SCNC: 134 MMOL/L (ref 136–145)
SODIUM SERPL-SCNC: 137 MMOL/L (ref 136–145)
SODIUM SERPL-SCNC: 139 MMOL/L (ref 136–145)
TRIGL FLD-MCNC: 309 MG/DL
TROPONIN T SERPL HS-MCNC: 28 NG/L
UPPER ARTERIAL LEFT ARM BRACHIAL SYS MAX: 77 MMHG
UPPER ARTERIAL LEFT ARM RADIAL SYS MAX: 0 MMHG
UPPER ARTERIAL RIGHT ARM BRACHIAL SYS MAX: 73 MMHG
UPPER ARTERIAL RIGHT ARM RADIAL SYS MAX: 75 MMHG
WBC MORPH BLD: NORMAL
WBC NRBC COR # BLD: 5.39 10*3/MM3 (ref 3.4–10.8)
WBC NRBC COR # BLD: 5.71 10*3/MM3 (ref 3.4–10.8)
WBC NRBC COR # BLD: 6.88 10*3/MM3 (ref 3.4–10.8)
WBC NRBC COR # BLD: 8.21 10*3/MM3 (ref 3.4–10.8)
WHOLE BLOOD HOLD COAG: NORMAL
WHOLE BLOOD HOLD SPECIMEN: NORMAL

## 2023-01-01 PROCEDURE — 71045 X-RAY EXAM CHEST 1 VIEW: CPT

## 2023-01-01 PROCEDURE — 84157 ASSAY OF PROTEIN OTHER: CPT | Performed by: STUDENT IN AN ORGANIZED HEALTH CARE EDUCATION/TRAINING PROGRAM

## 2023-01-01 PROCEDURE — 63710000001 PREDNISONE PER 1 MG: Performed by: STUDENT IN AN ORGANIZED HEALTH CARE EDUCATION/TRAINING PROGRAM

## 2023-01-01 PROCEDURE — 87070 CULTURE OTHR SPECIMN AEROBIC: CPT | Performed by: STUDENT IN AN ORGANIZED HEALTH CARE EDUCATION/TRAINING PROGRAM

## 2023-01-01 PROCEDURE — 25010000002 ENOXAPARIN PER 10 MG: Performed by: INTERNAL MEDICINE

## 2023-01-01 PROCEDURE — 85730 THROMBOPLASTIN TIME PARTIAL: CPT | Performed by: INTERNAL MEDICINE

## 2023-01-01 PROCEDURE — 0 CEFEPIME PER 500 MG: Performed by: STUDENT IN AN ORGANIZED HEALTH CARE EDUCATION/TRAINING PROGRAM

## 2023-01-01 PROCEDURE — 87205 SMEAR GRAM STAIN: CPT | Performed by: STUDENT IN AN ORGANIZED HEALTH CARE EDUCATION/TRAINING PROGRAM

## 2023-01-01 PROCEDURE — 88184 FLOWCYTOMETRY/ TC 1 MARKER: CPT

## 2023-01-01 PROCEDURE — 93306 TTE W/DOPPLER COMPLETE: CPT

## 2023-01-01 PROCEDURE — 25010000002 SODIUM CHLORIDE 0.9 % WITH KCL 20 MEQ 20-0.9 MEQ/L-% SOLUTION: Performed by: INTERNAL MEDICINE

## 2023-01-01 PROCEDURE — 99233 SBSQ HOSP IP/OBS HIGH 50: CPT | Performed by: INTERNAL MEDICINE

## 2023-01-01 PROCEDURE — 99233 SBSQ HOSP IP/OBS HIGH 50: CPT | Performed by: STUDENT IN AN ORGANIZED HEALTH CARE EDUCATION/TRAINING PROGRAM

## 2023-01-01 PROCEDURE — 84478 ASSAY OF TRIGLYCERIDES: CPT | Performed by: STUDENT IN AN ORGANIZED HEALTH CARE EDUCATION/TRAINING PROGRAM

## 2023-01-01 PROCEDURE — 82042 OTHER SOURCE ALBUMIN QUAN EA: CPT | Performed by: STUDENT IN AN ORGANIZED HEALTH CARE EDUCATION/TRAINING PROGRAM

## 2023-01-01 PROCEDURE — 99223 1ST HOSP IP/OBS HIGH 75: CPT | Performed by: STUDENT IN AN ORGANIZED HEALTH CARE EDUCATION/TRAINING PROGRAM

## 2023-01-01 PROCEDURE — 87206 SMEAR FLUORESCENT/ACID STAI: CPT | Performed by: STUDENT IN AN ORGANIZED HEALTH CARE EDUCATION/TRAINING PROGRAM

## 2023-01-01 PROCEDURE — 83605 ASSAY OF LACTIC ACID: CPT | Performed by: STUDENT IN AN ORGANIZED HEALTH CARE EDUCATION/TRAINING PROGRAM

## 2023-01-01 PROCEDURE — 83986 ASSAY PH BODY FLUID NOS: CPT | Performed by: STUDENT IN AN ORGANIZED HEALTH CARE EDUCATION/TRAINING PROGRAM

## 2023-01-01 PROCEDURE — 89051 BODY FLUID CELL COUNT: CPT | Performed by: STUDENT IN AN ORGANIZED HEALTH CARE EDUCATION/TRAINING PROGRAM

## 2023-01-01 PROCEDURE — 87040 BLOOD CULTURE FOR BACTERIA: CPT | Performed by: STUDENT IN AN ORGANIZED HEALTH CARE EDUCATION/TRAINING PROGRAM

## 2023-01-01 PROCEDURE — 32555 ASPIRATE PLEURA W/ IMAGING: CPT | Performed by: STUDENT IN AN ORGANIZED HEALTH CARE EDUCATION/TRAINING PROGRAM

## 2023-01-01 PROCEDURE — 87102 FUNGUS ISOLATION CULTURE: CPT | Performed by: STUDENT IN AN ORGANIZED HEALTH CARE EDUCATION/TRAINING PROGRAM

## 2023-01-01 PROCEDURE — 83615 LACTATE (LD) (LDH) ENZYME: CPT | Performed by: STUDENT IN AN ORGANIZED HEALTH CARE EDUCATION/TRAINING PROGRAM

## 2023-01-01 PROCEDURE — 87116 MYCOBACTERIA CULTURE: CPT | Performed by: STUDENT IN AN ORGANIZED HEALTH CARE EDUCATION/TRAINING PROGRAM

## 2023-01-01 PROCEDURE — 94799 UNLISTED PULMONARY SVC/PX: CPT

## 2023-01-01 PROCEDURE — 76604 US EXAM CHEST: CPT | Performed by: STUDENT IN AN ORGANIZED HEALTH CARE EDUCATION/TRAINING PROGRAM

## 2023-01-01 PROCEDURE — 84311 SPECTROPHOTOMETRY: CPT | Performed by: STUDENT IN AN ORGANIZED HEALTH CARE EDUCATION/TRAINING PROGRAM

## 2023-01-01 PROCEDURE — 83880 ASSAY OF NATRIURETIC PEPTIDE: CPT

## 2023-01-01 PROCEDURE — 93010 ELECTROCARDIOGRAM REPORT: CPT | Performed by: INTERNAL MEDICINE

## 2023-01-01 PROCEDURE — 88305 TISSUE EXAM BY PATHOLOGIST: CPT | Performed by: STUDENT IN AN ORGANIZED HEALTH CARE EDUCATION/TRAINING PROGRAM

## 2023-01-01 PROCEDURE — 97165 OT EVAL LOW COMPLEX 30 MIN: CPT

## 2023-01-01 PROCEDURE — 99232 SBSQ HOSP IP/OBS MODERATE 35: CPT | Performed by: SURGERY

## 2023-01-01 PROCEDURE — 80048 BASIC METABOLIC PNL TOTAL CA: CPT | Performed by: INTERNAL MEDICINE

## 2023-01-01 PROCEDURE — 73206 CT ANGIO UPR EXTRM W/O&W/DYE: CPT

## 2023-01-01 PROCEDURE — 71046 X-RAY EXAM CHEST 2 VIEWS: CPT

## 2023-01-01 PROCEDURE — 87075 CULTR BACTERIA EXCEPT BLOOD: CPT | Performed by: STUDENT IN AN ORGANIZED HEALTH CARE EDUCATION/TRAINING PROGRAM

## 2023-01-01 PROCEDURE — 25010000002 FUROSEMIDE PER 20 MG: Performed by: INTERNAL MEDICINE

## 2023-01-01 PROCEDURE — 93923 UPR/LXTR ART STDY 3+ LVLS: CPT | Performed by: SURGERY

## 2023-01-01 PROCEDURE — 0202U NFCT DS 22 TRGT SARS-COV-2: CPT | Performed by: STUDENT IN AN ORGANIZED HEALTH CARE EDUCATION/TRAINING PROGRAM

## 2023-01-01 PROCEDURE — 84484 ASSAY OF TROPONIN QUANT: CPT

## 2023-01-01 PROCEDURE — 36415 COLL VENOUS BLD VENIPUNCTURE: CPT

## 2023-01-01 PROCEDURE — 88108 CYTOPATH CONCENTRATE TECH: CPT | Performed by: STUDENT IN AN ORGANIZED HEALTH CARE EDUCATION/TRAINING PROGRAM

## 2023-01-01 PROCEDURE — 32551 INSERTION OF CHEST TUBE: CPT | Performed by: STUDENT IN AN ORGANIZED HEALTH CARE EDUCATION/TRAINING PROGRAM

## 2023-01-01 PROCEDURE — 25010000002 OCTREOTIDE PER 25 MCG: Performed by: STUDENT IN AN ORGANIZED HEALTH CARE EDUCATION/TRAINING PROGRAM

## 2023-01-01 PROCEDURE — 97161 PT EVAL LOW COMPLEX 20 MIN: CPT

## 2023-01-01 PROCEDURE — 93923 UPR/LXTR ART STDY 3+ LVLS: CPT

## 2023-01-01 PROCEDURE — 25010000002 MORPHINE PER 10 MG: Performed by: EMERGENCY MEDICINE

## 2023-01-01 PROCEDURE — 85007 BL SMEAR W/DIFF WBC COUNT: CPT | Performed by: INTERNAL MEDICINE

## 2023-01-01 PROCEDURE — 94664 DEMO&/EVAL PT USE INHALER: CPT

## 2023-01-01 PROCEDURE — 93005 ELECTROCARDIOGRAM TRACING: CPT

## 2023-01-01 PROCEDURE — 25010000002 HYDROMORPHONE 1 MG/ML SOLUTION: Performed by: INTERNAL MEDICINE

## 2023-01-01 PROCEDURE — 88185 FLOWCYTOMETRY/TC ADD-ON: CPT | Performed by: STUDENT IN AN ORGANIZED HEALTH CARE EDUCATION/TRAINING PROGRAM

## 2023-01-01 PROCEDURE — 84155 ASSAY OF PROTEIN SERUM: CPT | Performed by: STUDENT IN AN ORGANIZED HEALTH CARE EDUCATION/TRAINING PROGRAM

## 2023-01-01 PROCEDURE — 85025 COMPLETE CBC W/AUTO DIFF WBC: CPT

## 2023-01-01 PROCEDURE — 85610 PROTHROMBIN TIME: CPT | Performed by: INTERNAL MEDICINE

## 2023-01-01 PROCEDURE — 94640 AIRWAY INHALATION TREATMENT: CPT

## 2023-01-01 PROCEDURE — 25010000002 HYALURONIDASE (HUMAN) 150 UNIT/ML SOLUTION 1 ML VIAL: Performed by: INTERNAL MEDICINE

## 2023-01-01 PROCEDURE — 0W9930Z DRAINAGE OF RIGHT PLEURAL CAVITY WITH DRAINAGE DEVICE, PERCUTANEOUS APPROACH: ICD-10-PCS | Performed by: STUDENT IN AN ORGANIZED HEALTH CARE EDUCATION/TRAINING PROGRAM

## 2023-01-01 PROCEDURE — 99223 1ST HOSP IP/OBS HIGH 75: CPT | Performed by: SURGERY

## 2023-01-01 PROCEDURE — 25010000002 HEPARIN (PORCINE) 25000-0.45 UT/250ML-% SOLUTION: Performed by: INTERNAL MEDICINE

## 2023-01-01 PROCEDURE — 82945 GLUCOSE OTHER FLUID: CPT | Performed by: STUDENT IN AN ORGANIZED HEALTH CARE EDUCATION/TRAINING PROGRAM

## 2023-01-01 PROCEDURE — 80053 COMPREHEN METABOLIC PANEL: CPT

## 2023-01-01 PROCEDURE — 85025 COMPLETE CBC W/AUTO DIFF WBC: CPT | Performed by: INTERNAL MEDICINE

## 2023-01-01 PROCEDURE — 93005 ELECTROCARDIOGRAM TRACING: CPT | Performed by: EMERGENCY MEDICINE

## 2023-01-01 PROCEDURE — 25510000001 IOPAMIDOL PER 1 ML: Performed by: INTERNAL MEDICINE

## 2023-01-01 PROCEDURE — 0W993ZX DRAINAGE OF RIGHT PLEURAL CAVITY, PERCUTANEOUS APPROACH, DIAGNOSTIC: ICD-10-PCS | Performed by: STUDENT IN AN ORGANIZED HEALTH CARE EDUCATION/TRAINING PROGRAM

## 2023-01-01 PROCEDURE — 99285 EMERGENCY DEPT VISIT HI MDM: CPT

## 2023-01-01 PROCEDURE — 0W993ZZ DRAINAGE OF RIGHT PLEURAL CAVITY, PERCUTANEOUS APPROACH: ICD-10-PCS | Performed by: STUDENT IN AN ORGANIZED HEALTH CARE EDUCATION/TRAINING PROGRAM

## 2023-01-01 PROCEDURE — 25010000002 ONDANSETRON PER 1 MG: Performed by: INTERNAL MEDICINE

## 2023-01-01 RX ORDER — HEPARIN SODIUM 10000 [USP'U]/100ML
18 INJECTION, SOLUTION INTRAVENOUS
Status: DISCONTINUED | OUTPATIENT
Start: 2023-01-01 | End: 2023-01-01

## 2023-01-01 RX ORDER — OCTREOTIDE ACETATE 100 UG/ML
50 INJECTION, SOLUTION INTRAVENOUS; SUBCUTANEOUS EVERY 8 HOURS SCHEDULED
Status: DISCONTINUED | OUTPATIENT
Start: 2023-01-01 | End: 2023-01-01 | Stop reason: HOSPADM

## 2023-01-01 RX ORDER — IPRATROPIUM BROMIDE AND ALBUTEROL SULFATE 2.5; .5 MG/3ML; MG/3ML
3 SOLUTION RESPIRATORY (INHALATION) EVERY 6 HOURS PRN
Status: DISCONTINUED | OUTPATIENT
Start: 2023-01-01 | End: 2023-01-01 | Stop reason: HOSPADM

## 2023-01-01 RX ORDER — SODIUM CHLORIDE 0.9 % (FLUSH) 0.9 %
10 SYRINGE (ML) INJECTION AS NEEDED
Status: DISCONTINUED | OUTPATIENT
Start: 2023-01-01 | End: 2023-01-01 | Stop reason: HOSPADM

## 2023-01-01 RX ORDER — FUROSEMIDE 10 MG/ML
20 INJECTION INTRAMUSCULAR; INTRAVENOUS EVERY 12 HOURS
Status: DISCONTINUED | OUTPATIENT
Start: 2023-01-01 | End: 2023-01-01

## 2023-01-01 RX ORDER — SODIUM CHLORIDE 9 MG/ML
40 INJECTION, SOLUTION INTRAVENOUS AS NEEDED
Status: DISCONTINUED | OUTPATIENT
Start: 2023-01-01 | End: 2023-01-01 | Stop reason: HOSPADM

## 2023-01-01 RX ORDER — BISACODYL 10 MG
10 SUPPOSITORY, RECTAL RECTAL DAILY PRN
Status: DISCONTINUED | OUTPATIENT
Start: 2023-01-01 | End: 2023-01-01 | Stop reason: HOSPADM

## 2023-01-01 RX ORDER — ACETAMINOPHEN 650 MG/1
650 SUPPOSITORY RECTAL EVERY 4 HOURS PRN
Status: DISCONTINUED | OUTPATIENT
Start: 2023-01-01 | End: 2023-01-01 | Stop reason: HOSPADM

## 2023-01-01 RX ORDER — NALOXONE HCL 0.4 MG/ML
0.4 VIAL (ML) INJECTION
Status: DISCONTINUED | OUTPATIENT
Start: 2023-01-01 | End: 2023-01-01 | Stop reason: HOSPADM

## 2023-01-01 RX ORDER — AMOXICILLIN 250 MG
2 CAPSULE ORAL NIGHTLY
Status: DISCONTINUED | OUTPATIENT
Start: 2023-01-01 | End: 2023-01-01 | Stop reason: HOSPADM

## 2023-01-01 RX ORDER — ONDANSETRON 2 MG/ML
4 INJECTION INTRAMUSCULAR; INTRAVENOUS EVERY 6 HOURS PRN
Status: DISCONTINUED | OUTPATIENT
Start: 2023-01-01 | End: 2023-01-01 | Stop reason: HOSPADM

## 2023-01-01 RX ORDER — HEPARIN SODIUM 10000 [USP'U]/100ML
18 INJECTION, SOLUTION INTRAVENOUS
Status: DISCONTINUED | OUTPATIENT
Start: 2023-01-01 | End: 2023-01-01 | Stop reason: HOSPADM

## 2023-01-01 RX ORDER — ACETAMINOPHEN 325 MG/1
650 TABLET ORAL EVERY 4 HOURS PRN
Status: DISCONTINUED | OUTPATIENT
Start: 2023-01-01 | End: 2023-01-01 | Stop reason: HOSPADM

## 2023-01-01 RX ORDER — FAMOTIDINE 20 MG/1
20 TABLET, FILM COATED ORAL DAILY
Status: DISCONTINUED | OUTPATIENT
Start: 2023-01-01 | End: 2023-01-01 | Stop reason: HOSPADM

## 2023-01-01 RX ORDER — HYDROCODONE BITARTRATE AND ACETAMINOPHEN 5; 325 MG/1; MG/1
1 TABLET ORAL EVERY 4 HOURS PRN
Status: DISCONTINUED | OUTPATIENT
Start: 2023-01-01 | End: 2023-01-01 | Stop reason: HOSPADM

## 2023-01-01 RX ORDER — SODIUM CHLORIDE AND POTASSIUM CHLORIDE 150; 900 MG/100ML; MG/100ML
100 INJECTION, SOLUTION INTRAVENOUS CONTINUOUS
Status: DISCONTINUED | OUTPATIENT
Start: 2023-01-01 | End: 2023-01-01

## 2023-01-01 RX ORDER — SODIUM CHLORIDE 0.9 % (FLUSH) 0.9 %
10 SYRINGE (ML) INJECTION EVERY 12 HOURS SCHEDULED
Status: DISCONTINUED | OUTPATIENT
Start: 2023-01-01 | End: 2023-01-01 | Stop reason: HOSPADM

## 2023-01-01 RX ORDER — PREDNISONE 20 MG/1
40 TABLET ORAL
Status: DISCONTINUED | OUTPATIENT
Start: 2023-01-01 | End: 2023-01-01 | Stop reason: HOSPADM

## 2023-01-01 RX ORDER — ACETAMINOPHEN 160 MG/5ML
650 SOLUTION ORAL EVERY 4 HOURS PRN
Status: DISCONTINUED | OUTPATIENT
Start: 2023-01-01 | End: 2023-01-01 | Stop reason: HOSPADM

## 2023-01-01 RX ORDER — METOPROLOL SUCCINATE 25 MG/1
25 TABLET, EXTENDED RELEASE ORAL
Status: DISCONTINUED | OUTPATIENT
Start: 2023-01-01 | End: 2023-01-01 | Stop reason: HOSPADM

## 2023-01-01 RX ORDER — BISACODYL 5 MG/1
5 TABLET, DELAYED RELEASE ORAL DAILY PRN
Status: DISCONTINUED | OUTPATIENT
Start: 2023-01-01 | End: 2023-01-01 | Stop reason: HOSPADM

## 2023-01-01 RX ORDER — HYDROCODONE BITARTRATE AND ACETAMINOPHEN 5; 325 MG/1; MG/1
1 TABLET ORAL EVERY 8 HOURS PRN
Status: DISCONTINUED | OUTPATIENT
Start: 2023-01-01 | End: 2023-01-01 | Stop reason: SDUPTHER

## 2023-01-01 RX ORDER — POLYETHYLENE GLYCOL 3350 17 G/17G
17 POWDER, FOR SOLUTION ORAL DAILY PRN
Status: DISCONTINUED | OUTPATIENT
Start: 2023-01-01 | End: 2023-01-01 | Stop reason: HOSPADM

## 2023-01-01 RX ORDER — ENOXAPARIN SODIUM 100 MG/ML
30 INJECTION SUBCUTANEOUS EVERY 24 HOURS
Status: DISCONTINUED | OUTPATIENT
Start: 2023-01-01 | End: 2023-01-01

## 2023-01-01 RX ORDER — SODIUM CHLORIDE AND POTASSIUM CHLORIDE 150; 900 MG/100ML; MG/100ML
100 INJECTION, SOLUTION INTRAVENOUS CONTINUOUS
Status: DISCONTINUED | OUTPATIENT
Start: 2023-01-01 | End: 2023-01-01 | Stop reason: HOSPADM

## 2023-01-01 RX ORDER — ENOXAPARIN SODIUM 100 MG/ML
30 INJECTION SUBCUTANEOUS DAILY
Status: DISCONTINUED | OUTPATIENT
Start: 2023-01-01 | End: 2023-01-01

## 2023-01-01 RX ORDER — ENOXAPARIN SODIUM 100 MG/ML
30 INJECTION SUBCUTANEOUS NIGHTLY
Status: DISCONTINUED | OUTPATIENT
Start: 2023-01-01 | End: 2023-01-01

## 2023-01-01 RX ORDER — MORPHINE SULFATE 2 MG/ML
2 INJECTION, SOLUTION INTRAMUSCULAR; INTRAVENOUS ONCE
Status: COMPLETED | OUTPATIENT
Start: 2023-01-01 | End: 2023-01-01

## 2023-01-01 RX ADMIN — FUROSEMIDE 20 MG: 10 INJECTION, SOLUTION INTRAMUSCULAR; INTRAVENOUS at 09:49

## 2023-01-01 RX ADMIN — HEPARIN SODIUM 18 UNITS/KG/HR: 10000 INJECTION, SOLUTION INTRAVENOUS at 04:33

## 2023-01-01 RX ADMIN — HYDROMORPHONE HYDROCHLORIDE 0.5 MG: 1 INJECTION, SOLUTION INTRAMUSCULAR; INTRAVENOUS; SUBCUTANEOUS at 09:49

## 2023-01-01 RX ADMIN — Medication 10 ML: at 09:49

## 2023-01-01 RX ADMIN — FUROSEMIDE 20 MG: 10 INJECTION, SOLUTION INTRAMUSCULAR; INTRAVENOUS at 06:25

## 2023-01-01 RX ADMIN — HEPARIN SODIUM 18 UNITS/KG/HR: 10000 INJECTION, SOLUTION INTRAVENOUS at 00:33

## 2023-01-01 RX ADMIN — METOPROLOL SUCCINATE 25 MG: 25 TABLET, EXTENDED RELEASE ORAL at 08:08

## 2023-01-01 RX ADMIN — IPRATROPIUM BROMIDE AND ALBUTEROL SULFATE 3 ML: .5; 3 SOLUTION RESPIRATORY (INHALATION) at 06:13

## 2023-01-01 RX ADMIN — FUROSEMIDE 20 MG: 10 INJECTION, SOLUTION INTRAMUSCULAR; INTRAVENOUS at 18:28

## 2023-01-01 RX ADMIN — ENOXAPARIN SODIUM 30 MG: 100 INJECTION SUBCUTANEOUS at 09:25

## 2023-01-01 RX ADMIN — FUROSEMIDE 20 MG: 10 INJECTION, SOLUTION INTRAMUSCULAR; INTRAVENOUS at 05:38

## 2023-01-01 RX ADMIN — HYDROCODONE BITARTRATE AND ACETAMINOPHEN 1 TABLET: 5; 325 TABLET ORAL at 18:02

## 2023-01-01 RX ADMIN — HYDROCODONE BITARTRATE AND ACETAMINOPHEN 1 TABLET: 5; 325 TABLET ORAL at 17:54

## 2023-01-01 RX ADMIN — SENNOSIDES AND DOCUSATE SODIUM 2 TABLET: 50; 8.6 TABLET ORAL at 23:10

## 2023-01-01 RX ADMIN — HYDROCODONE BITARTRATE AND ACETAMINOPHEN 1 TABLET: 5; 325 TABLET ORAL at 23:10

## 2023-01-01 RX ADMIN — FAMOTIDINE 20 MG: 20 TABLET ORAL at 17:54

## 2023-01-01 RX ADMIN — HYDROCODONE BITARTRATE AND ACETAMINOPHEN 1 TABLET: 5; 325 TABLET ORAL at 10:25

## 2023-01-01 RX ADMIN — POTASSIUM CHLORIDE AND SODIUM CHLORIDE 100 ML/HR: 900; 150 INJECTION, SOLUTION INTRAVENOUS at 23:38

## 2023-01-01 RX ADMIN — HYDROMORPHONE HYDROCHLORIDE 0.5 MG: 1 INJECTION, SOLUTION INTRAMUSCULAR; INTRAVENOUS; SUBCUTANEOUS at 20:28

## 2023-01-01 RX ADMIN — IPRATROPIUM BROMIDE AND ALBUTEROL SULFATE 3 ML: .5; 3 SOLUTION RESPIRATORY (INHALATION) at 00:25

## 2023-01-01 RX ADMIN — ONDANSETRON 4 MG: 2 INJECTION INTRAMUSCULAR; INTRAVENOUS at 06:24

## 2023-01-01 RX ADMIN — CEFEPIME 2000 MG: 2 INJECTION, POWDER, FOR SOLUTION INTRAVENOUS at 17:07

## 2023-01-01 RX ADMIN — IOPAMIDOL 100 ML: 755 INJECTION, SOLUTION INTRAVENOUS at 21:14

## 2023-01-01 RX ADMIN — CEFEPIME 2000 MG: 2 INJECTION, POWDER, FOR SOLUTION INTRAVENOUS at 04:06

## 2023-01-01 RX ADMIN — HYDROMORPHONE HYDROCHLORIDE 0.5 MG: 1 INJECTION, SOLUTION INTRAMUSCULAR; INTRAVENOUS; SUBCUTANEOUS at 12:32

## 2023-01-01 RX ADMIN — SENNOSIDES AND DOCUSATE SODIUM 2 TABLET: 50; 8.6 TABLET ORAL at 21:12

## 2023-01-01 RX ADMIN — FUROSEMIDE 20 MG: 10 INJECTION, SOLUTION INTRAMUSCULAR; INTRAVENOUS at 07:08

## 2023-01-01 RX ADMIN — FAMOTIDINE 20 MG: 20 TABLET ORAL at 08:08

## 2023-01-01 RX ADMIN — HYDROCODONE BITARTRATE AND ACETAMINOPHEN 1 TABLET: 5; 325 TABLET ORAL at 06:26

## 2023-01-01 RX ADMIN — HYDROMORPHONE HYDROCHLORIDE 0.5 MG: 1 INJECTION, SOLUTION INTRAMUSCULAR; INTRAVENOUS; SUBCUTANEOUS at 21:28

## 2023-01-01 RX ADMIN — ENOXAPARIN SODIUM 30 MG: 100 INJECTION SUBCUTANEOUS at 17:55

## 2023-01-01 RX ADMIN — HYDROCODONE BITARTRATE AND ACETAMINOPHEN 1 TABLET: 5; 325 TABLET ORAL at 04:18

## 2023-01-01 RX ADMIN — HYALURONIDASE (HUMAN RECOMBINANT) 150 UNITS: 150 INJECTION, SOLUTION SUBCUTANEOUS at 23:13

## 2023-01-01 RX ADMIN — FUROSEMIDE 20 MG: 10 INJECTION, SOLUTION INTRAMUSCULAR; INTRAVENOUS at 17:54

## 2023-01-01 RX ADMIN — HYDROMORPHONE HYDROCHLORIDE 0.5 MG: 1 INJECTION, SOLUTION INTRAMUSCULAR; INTRAVENOUS; SUBCUTANEOUS at 05:51

## 2023-01-01 RX ADMIN — HYDROMORPHONE HYDROCHLORIDE 0.5 MG: 1 INJECTION, SOLUTION INTRAMUSCULAR; INTRAVENOUS; SUBCUTANEOUS at 06:16

## 2023-01-01 RX ADMIN — OCTREOTIDE ACETATE 50 MCG: 100 INJECTION, SOLUTION INTRAVENOUS; SUBCUTANEOUS at 22:44

## 2023-01-01 RX ADMIN — Medication 10 ML: at 20:28

## 2023-01-01 RX ADMIN — HYDROCODONE BITARTRATE AND ACETAMINOPHEN 1 TABLET: 5; 325 TABLET ORAL at 05:38

## 2023-01-01 RX ADMIN — MORPHINE SULFATE 2 MG: 2 INJECTION, SOLUTION INTRAMUSCULAR; INTRAVENOUS at 14:30

## 2023-01-01 RX ADMIN — HYDROCODONE BITARTRATE AND ACETAMINOPHEN 1 TABLET: 5; 325 TABLET ORAL at 14:41

## 2023-01-01 RX ADMIN — Medication 10 ML: at 09:25

## 2023-01-01 RX ADMIN — HYDROCODONE BITARTRATE AND ACETAMINOPHEN 1 TABLET: 5; 325 TABLET ORAL at 13:22

## 2023-01-01 RX ADMIN — FAMOTIDINE 20 MG: 20 TABLET ORAL at 09:25

## 2023-01-01 RX ADMIN — PREDNISONE 40 MG: 20 TABLET ORAL at 08:07

## 2023-01-01 RX ADMIN — FAMOTIDINE 20 MG: 20 TABLET ORAL at 09:55

## 2023-01-01 RX ADMIN — Medication 10 ML: at 22:44

## 2023-01-01 RX ADMIN — HYDROCODONE BITARTRATE AND ACETAMINOPHEN 1 TABLET: 5; 325 TABLET ORAL at 18:42

## 2023-01-01 RX ADMIN — POTASSIUM CHLORIDE AND SODIUM CHLORIDE 100 ML/HR: 900; 150 INJECTION, SOLUTION INTRAVENOUS at 17:55

## 2023-01-01 RX ADMIN — CEFEPIME 2000 MG: 2 INJECTION, POWDER, FOR SOLUTION INTRAVENOUS at 05:38

## 2023-01-01 RX ADMIN — FUROSEMIDE 20 MG: 10 INJECTION, SOLUTION INTRAMUSCULAR; INTRAVENOUS at 22:43

## 2023-01-01 RX ADMIN — HYDROMORPHONE HYDROCHLORIDE 0.5 MG: 1 INJECTION, SOLUTION INTRAMUSCULAR; INTRAVENOUS; SUBCUTANEOUS at 13:25

## 2023-01-01 RX ADMIN — METOPROLOL SUCCINATE 25 MG: 25 TABLET, EXTENDED RELEASE ORAL at 09:57

## 2023-01-01 RX ADMIN — Medication 10 ML: at 09:56

## 2023-01-01 RX ADMIN — OCTREOTIDE ACETATE 50 MCG: 100 INJECTION, SOLUTION INTRAVENOUS; SUBCUTANEOUS at 13:25

## 2023-01-01 RX ADMIN — PREDNISONE 40 MG: 20 TABLET ORAL at 09:55

## 2023-01-01 RX ADMIN — POTASSIUM CHLORIDE AND SODIUM CHLORIDE 100 ML/HR: 900; 150 INJECTION, SOLUTION INTRAVENOUS at 03:54

## 2023-01-01 RX ADMIN — SENNOSIDES AND DOCUSATE SODIUM 2 TABLET: 50; 8.6 TABLET ORAL at 20:28

## 2023-01-01 RX ADMIN — POTASSIUM CHLORIDE AND SODIUM CHLORIDE 100 ML/HR: 900; 150 INJECTION, SOLUTION INTRAVENOUS at 01:49

## 2023-01-01 RX ADMIN — ENOXAPARIN SODIUM 30 MG: 100 INJECTION SUBCUTANEOUS at 16:15

## 2023-01-01 RX ADMIN — METOPROLOL SUCCINATE 25 MG: 25 TABLET, EXTENDED RELEASE ORAL at 09:25

## 2023-01-01 RX ADMIN — Medication 10 ML: at 08:09

## 2023-01-01 RX ADMIN — OCTREOTIDE ACETATE 50 MCG: 100 INJECTION, SOLUTION INTRAVENOUS; SUBCUTANEOUS at 06:15

## 2023-01-01 RX ADMIN — Medication 10 ML: at 23:11

## 2023-01-01 RX ADMIN — HYDROMORPHONE HYDROCHLORIDE 0.5 MG: 1 INJECTION, SOLUTION INTRAMUSCULAR; INTRAVENOUS; SUBCUTANEOUS at 08:08

## 2023-01-01 RX ADMIN — CEFEPIME 2000 MG: 2 INJECTION, POWDER, FOR SOLUTION INTRAVENOUS at 03:52

## 2023-01-01 RX ADMIN — HYDROMORPHONE HYDROCHLORIDE 0.5 MG: 1 INJECTION, SOLUTION INTRAMUSCULAR; INTRAVENOUS; SUBCUTANEOUS at 16:15

## 2023-01-09 ENCOUNTER — APPOINTMENT (OUTPATIENT)
Dept: GENERAL RADIOLOGY | Facility: HOSPITAL | Age: 78
DRG: 871 | End: 2023-01-09
Payer: MEDICARE

## 2023-01-09 ENCOUNTER — APPOINTMENT (OUTPATIENT)
Dept: CT IMAGING | Facility: HOSPITAL | Age: 78
DRG: 871 | End: 2023-01-09
Payer: MEDICARE

## 2023-01-09 ENCOUNTER — HOSPITAL ENCOUNTER (INPATIENT)
Facility: HOSPITAL | Age: 78
LOS: 4 days | Discharge: HOME | DRG: 871 | End: 2023-01-13
Attending: EMERGENCY MEDICINE | Admitting: STUDENT IN AN ORGANIZED HEALTH CARE EDUCATION/TRAINING PROGRAM
Payer: MEDICARE

## 2023-01-09 DIAGNOSIS — Z78.9 DECREASED ACTIVITIES OF DAILY LIVING (ADL): ICD-10-CM

## 2023-01-09 DIAGNOSIS — A41.9 SEPSIS, DUE TO UNSPECIFIED ORGANISM, UNSPECIFIED WHETHER ACUTE ORGAN DYSFUNCTION PRESENT: ICD-10-CM

## 2023-01-09 DIAGNOSIS — E87.5 HYPERKALEMIA: ICD-10-CM

## 2023-01-09 DIAGNOSIS — N39.0 URINARY TRACT INFECTION WITHOUT HEMATURIA, SITE UNSPECIFIED: Primary | ICD-10-CM

## 2023-01-09 DIAGNOSIS — R26.2 DIFFICULTY IN WALKING: ICD-10-CM

## 2023-01-09 LAB
ALBUMIN SERPL-MCNC: 4.2 G/DL (ref 3.5–5.2)
ALBUMIN/GLOB SERPL: 1.2 G/DL
ALP SERPL-CCNC: 148 U/L (ref 39–117)
ALT SERPL W P-5'-P-CCNC: 33 U/L (ref 1–33)
ANION GAP SERPL CALCULATED.3IONS-SCNC: 14.4 MMOL/L (ref 5–15)
AST SERPL-CCNC: 56 U/L (ref 1–32)
BACTERIA UR QL AUTO: ABNORMAL /HPF
BASE EXCESS BLDV CALC-SCNC: -4.3 MMOL/L (ref -2–2)
BASOPHILS # BLD AUTO: 0.1 10*3/MM3 (ref 0–0.2)
BASOPHILS NFR BLD AUTO: 0.6 % (ref 0–1.5)
BDY SITE: ABNORMAL
BILIRUB SERPL-MCNC: 0.6 MG/DL (ref 0–1.2)
BILIRUB UR QL STRIP: ABNORMAL
BUN SERPL-MCNC: 33 MG/DL (ref 8–23)
BUN/CREAT SERPL: 17.9 (ref 7–25)
CA-I BLDA-SCNC: 1.15 MMOL/L (ref 1.13–1.32)
CALCIUM SPEC-SCNC: 10.7 MG/DL (ref 8.6–10.5)
CHLORIDE BLDV-SCNC: 106 MMOL/L (ref 98–106)
CHLORIDE SERPL-SCNC: 98 MMOL/L (ref 98–107)
CLARITY UR: ABNORMAL
CO2 SERPL-SCNC: 22.6 MMOL/L (ref 22–29)
COHGB MFR BLD: 3.1 % (ref 0–1.5)
COLOR UR: ABNORMAL
CREAT SERPL-MCNC: 1.84 MG/DL (ref 0.57–1)
D-LACTATE SERPL-SCNC: 3.5 MMOL/L (ref 0.5–2)
D-LACTATE SERPL-SCNC: 4 MMOL/L (ref 0.5–2)
DEPRECATED RDW RBC AUTO: 51.7 FL (ref 37–54)
EGFRCR SERPLBLD CKD-EPI 2021: 27.6 ML/MIN/1.73
EOSINOPHIL # BLD AUTO: 0.01 10*3/MM3 (ref 0–0.4)
EOSINOPHIL NFR BLD AUTO: 0.1 % (ref 0.3–6.2)
ERYTHROCYTE [DISTWIDTH] IN BLOOD BY AUTOMATED COUNT: 14.2 % (ref 12.3–15.4)
FHHB: 51.8 % (ref 0–5)
GAS FLOW AIRWAY: ABNORMAL L/MIN
GLOBULIN UR ELPH-MCNC: 3.4 GM/DL
GLUCOSE BLDA-MCNC: 197 MG/DL (ref 65–99)
GLUCOSE SERPL-MCNC: 145 MG/DL (ref 65–99)
GLUCOSE UR STRIP-MCNC: ABNORMAL MG/DL
HCO3 BLDV-SCNC: 22.8 MMOL/L (ref 22–26)
HCT VFR BLD AUTO: 46.7 % (ref 34–46.6)
HGB BLD-MCNC: 15.5 G/DL (ref 12–15.9)
HGB BLDA-MCNC: 12.8 G/DL (ref 11.7–14.6)
HGB UR QL STRIP.AUTO: ABNORMAL
HOLD SPECIMEN: NORMAL
HOLD SPECIMEN: NORMAL
HYALINE CASTS UR QL AUTO: ABNORMAL /LPF
IMM GRANULOCYTES # BLD AUTO: 0.08 10*3/MM3 (ref 0–0.05)
IMM GRANULOCYTES NFR BLD AUTO: 0.5 % (ref 0–0.5)
INHALED O2 CONCENTRATION: 21 %
KETONES UR QL STRIP: ABNORMAL
LACTATE BLDA-SCNC: 3.55 MMOL/L (ref 0.5–2)
LEUKOCYTE ESTERASE UR QL STRIP.AUTO: ABNORMAL
LYMPHOCYTES # BLD AUTO: 0.48 10*3/MM3 (ref 0.7–3.1)
LYMPHOCYTES NFR BLD AUTO: 2.9 % (ref 19.6–45.3)
MCH RBC QN AUTO: 32.9 PG (ref 26.6–33)
MCHC RBC AUTO-ENTMCNC: 33.2 G/DL (ref 31.5–35.7)
MCV RBC AUTO: 99.2 FL (ref 79–97)
METHGB BLD QL: 0 % (ref 0–1.5)
MODALITY: ABNORMAL
MONOCYTES # BLD AUTO: 0.85 10*3/MM3 (ref 0.1–0.9)
MONOCYTES NFR BLD AUTO: 5.2 % (ref 5–12)
NEUTROPHILS NFR BLD AUTO: 14.76 10*3/MM3 (ref 1.7–7)
NEUTROPHILS NFR BLD AUTO: 90.7 % (ref 42.7–76)
NITRITE UR QL STRIP: ABNORMAL
NOTE: ABNORMAL
NRBC BLD AUTO-RTO: 0 /100 WBC (ref 0–0.2)
NT-PROBNP SERPL-MCNC: 2590 PG/ML (ref 0–1800)
OXYHGB MFR BLDV: 45.1 % (ref 94–99)
PCO2 BLDV: 50.2 MM HG (ref 41–51)
PH BLDV: 7.28 PH UNITS (ref 7.31–7.41)
PH UR STRIP.AUTO: ABNORMAL [PH]
PLATELET # BLD AUTO: 286 10*3/MM3 (ref 140–450)
PMV BLD AUTO: 9.8 FL (ref 6–12)
PO2 BLDV: <40.5 MM HG (ref 35–42)
POTASSIUM BLDV-SCNC: 4.5 MMOL/L (ref 3.5–5)
POTASSIUM SERPL-SCNC: 6.3 MMOL/L (ref 3.5–5.2)
PROT SERPL-MCNC: 7.6 G/DL (ref 6–8.5)
PROT UR QL STRIP: ABNORMAL
RBC # BLD AUTO: 4.71 10*6/MM3 (ref 3.77–5.28)
RBC # UR STRIP: ABNORMAL /HPF
REF LAB TEST METHOD: ABNORMAL
SAO2 % BLDCOV: 46.5 % (ref 45–75)
SODIUM BLDV-SCNC: 135.2 MMOL/L (ref 136–146)
SODIUM SERPL-SCNC: 135 MMOL/L (ref 136–145)
SP GR UR STRIP: 1.02 (ref 1–1.03)
SQUAMOUS #/AREA URNS HPF: ABNORMAL /HPF
TROPONIN T SERPL-MCNC: <0.01 NG/ML (ref 0–0.03)
UROBILINOGEN UR QL STRIP: ABNORMAL
WBC # UR STRIP: ABNORMAL /HPF
WBC NRBC COR # BLD: 16.28 10*3/MM3 (ref 3.4–10.8)
WHOLE BLOOD HOLD COAG: NORMAL
WHOLE BLOOD HOLD SPECIMEN: NORMAL

## 2023-01-09 PROCEDURE — 63710000001 INSULIN REGULAR HUMAN PER 5 UNITS: Performed by: EMERGENCY MEDICINE

## 2023-01-09 PROCEDURE — 93005 ELECTROCARDIOGRAM TRACING: CPT | Performed by: EMERGENCY MEDICINE

## 2023-01-09 PROCEDURE — 83880 ASSAY OF NATRIURETIC PEPTIDE: CPT | Performed by: EMERGENCY MEDICINE

## 2023-01-09 PROCEDURE — 93010 ELECTROCARDIOGRAM REPORT: CPT | Performed by: INTERNAL MEDICINE

## 2023-01-09 PROCEDURE — 87086 URINE CULTURE/COLONY COUNT: CPT | Performed by: EMERGENCY MEDICINE

## 2023-01-09 PROCEDURE — 36415 COLL VENOUS BLD VENIPUNCTURE: CPT

## 2023-01-09 PROCEDURE — 99285 EMERGENCY DEPT VISIT HI MDM: CPT

## 2023-01-09 PROCEDURE — 25010000002 CALCIUM GLUCONATE-NACL 1-0.675 GM/50ML-% SOLUTION: Performed by: EMERGENCY MEDICINE

## 2023-01-09 PROCEDURE — 85025 COMPLETE CBC W/AUTO DIFF WBC: CPT

## 2023-01-09 PROCEDURE — 74176 CT ABD & PELVIS W/O CONTRAST: CPT

## 2023-01-09 PROCEDURE — 82820 HEMOGLOBIN-OXYGEN AFFINITY: CPT | Performed by: EMERGENCY MEDICINE

## 2023-01-09 PROCEDURE — 71045 X-RAY EXAM CHEST 1 VIEW: CPT

## 2023-01-09 PROCEDURE — 87040 BLOOD CULTURE FOR BACTERIA: CPT

## 2023-01-09 PROCEDURE — 83605 ASSAY OF LACTIC ACID: CPT | Performed by: EMERGENCY MEDICINE

## 2023-01-09 PROCEDURE — 81001 URINALYSIS AUTO W/SCOPE: CPT

## 2023-01-09 PROCEDURE — 25010000002 CEFTRIAXONE PER 250 MG: Performed by: EMERGENCY MEDICINE

## 2023-01-09 PROCEDURE — 83605 ASSAY OF LACTIC ACID: CPT

## 2023-01-09 PROCEDURE — 84484 ASSAY OF TROPONIN QUANT: CPT | Performed by: EMERGENCY MEDICINE

## 2023-01-09 PROCEDURE — 82805 BLOOD GASES W/O2 SATURATION: CPT | Performed by: EMERGENCY MEDICINE

## 2023-01-09 PROCEDURE — 80053 COMPREHEN METABOLIC PANEL: CPT

## 2023-01-09 RX ORDER — SODIUM CHLORIDE 0.9 % (FLUSH) 0.9 %
10 SYRINGE (ML) INJECTION AS NEEDED
Status: DISCONTINUED | OUTPATIENT
Start: 2023-01-09 | End: 2023-01-13 | Stop reason: HOSPADM

## 2023-01-09 RX ORDER — HYDROCODONE BITARTRATE AND ACETAMINOPHEN 5; 325 MG/1; MG/1
1 TABLET ORAL EVERY 6 HOURS PRN
COMMUNITY

## 2023-01-09 RX ORDER — CALCIUM GLUCONATE 20 MG/ML
1 INJECTION, SOLUTION INTRAVENOUS ONCE
Status: COMPLETED | OUTPATIENT
Start: 2023-01-09 | End: 2023-01-09

## 2023-01-09 RX ORDER — CEFTRIAXONE SODIUM 1 G/50ML
1 INJECTION, SOLUTION INTRAVENOUS ONCE
Status: COMPLETED | OUTPATIENT
Start: 2023-01-09 | End: 2023-01-09

## 2023-01-09 RX ORDER — DEXTROSE MONOHYDRATE 25 G/50ML
25 INJECTION, SOLUTION INTRAVENOUS ONCE
Status: COMPLETED | OUTPATIENT
Start: 2023-01-09 | End: 2023-01-09

## 2023-01-09 RX ADMIN — SODIUM CHLORIDE 1000 ML: 9 INJECTION, SOLUTION INTRAVENOUS at 22:14

## 2023-01-09 RX ADMIN — CEFTRIAXONE SODIUM 1 G: 1 INJECTION, SOLUTION INTRAVENOUS at 22:14

## 2023-01-09 RX ADMIN — INSULIN HUMAN 8.4 UNITS: 100 INJECTION, SOLUTION PARENTERAL at 22:31

## 2023-01-09 RX ADMIN — SODIUM CHLORIDE 1000 ML: 9 INJECTION, SOLUTION INTRAVENOUS at 21:33

## 2023-01-09 RX ADMIN — CALCIUM GLUCONATE 1 G: 20 INJECTION, SOLUTION INTRAVENOUS at 22:17

## 2023-01-09 RX ADMIN — DEXTROSE MONOHYDRATE 25 G: 25 INJECTION, SOLUTION INTRAVENOUS at 22:33

## 2023-01-09 RX ADMIN — SODIUM BICARBONATE 50 MEQ: 84 INJECTION INTRAVENOUS at 22:28

## 2023-01-09 NOTE — Clinical Note
[facility] EMERGENCY ROOM  [address]  [place] [address]  Phone: [phone]    Melissa Mal accompanied Sandra Sylvester to the emergency department on 1/9/2023. They may return to work on 01/10/2023.        Thank you for choosing [facility].    Khang Key RN

## 2023-01-10 PROBLEM — N17.9 AKI (ACUTE KIDNEY INJURY) (HCC): Status: ACTIVE | Noted: 2023-01-10

## 2023-01-10 PROBLEM — E46 MALNOURISHED: Status: ACTIVE | Noted: 2023-01-10

## 2023-01-10 PROBLEM — I10 HTN (HYPERTENSION): Status: ACTIVE | Noted: 2023-01-10

## 2023-01-10 LAB
ANION GAP SERPL CALCULATED.3IONS-SCNC: 10.1 MMOL/L (ref 5–15)
ANION GAP SERPL CALCULATED.3IONS-SCNC: 14.1 MMOL/L (ref 5–15)
BUN SERPL-MCNC: 26 MG/DL (ref 8–23)
BUN SERPL-MCNC: 27 MG/DL (ref 8–23)
BUN/CREAT SERPL: 22 (ref 7–25)
BUN/CREAT SERPL: 22.7 (ref 7–25)
CALCIUM SPEC-SCNC: 8.7 MG/DL (ref 8.6–10.5)
CALCIUM SPEC-SCNC: 8.9 MG/DL (ref 8.6–10.5)
CHLORIDE SERPL-SCNC: 104 MMOL/L (ref 98–107)
CHLORIDE SERPL-SCNC: 106 MMOL/L (ref 98–107)
CO2 SERPL-SCNC: 15.9 MMOL/L (ref 22–29)
CO2 SERPL-SCNC: 18.9 MMOL/L (ref 22–29)
CREAT SERPL-MCNC: 1.18 MG/DL (ref 0.57–1)
CREAT SERPL-MCNC: 1.19 MG/DL (ref 0.57–1)
D-LACTATE SERPL-SCNC: 1.7 MMOL/L (ref 0.5–2)
EGFRCR SERPLBLD CKD-EPI 2021: 46.6 ML/MIN/1.73
EGFRCR SERPLBLD CKD-EPI 2021: 47.1 ML/MIN/1.73
GLUCOSE SERPL-MCNC: 102 MG/DL (ref 65–99)
GLUCOSE SERPL-MCNC: 111 MG/DL (ref 65–99)
MAGNESIUM SERPL-MCNC: 2 MG/DL (ref 1.6–2.4)
PHOSPHATE SERPL-MCNC: 2.8 MG/DL (ref 2.5–4.5)
POTASSIUM SERPL-SCNC: 4.6 MMOL/L (ref 3.5–5.2)
POTASSIUM SERPL-SCNC: 4.7 MMOL/L (ref 3.5–5.2)
QT INTERVAL: 315 MS
SODIUM SERPL-SCNC: 134 MMOL/L (ref 136–145)
SODIUM SERPL-SCNC: 135 MMOL/L (ref 136–145)

## 2023-01-10 PROCEDURE — 83605 ASSAY OF LACTIC ACID: CPT | Performed by: EMERGENCY MEDICINE

## 2023-01-10 PROCEDURE — 25010000002 ENOXAPARIN PER 10 MG: Performed by: STUDENT IN AN ORGANIZED HEALTH CARE EDUCATION/TRAINING PROGRAM

## 2023-01-10 PROCEDURE — 83735 ASSAY OF MAGNESIUM: CPT | Performed by: STUDENT IN AN ORGANIZED HEALTH CARE EDUCATION/TRAINING PROGRAM

## 2023-01-10 PROCEDURE — 25010000002 CEFTRIAXONE PER 250 MG: Performed by: STUDENT IN AN ORGANIZED HEALTH CARE EDUCATION/TRAINING PROGRAM

## 2023-01-10 PROCEDURE — 84100 ASSAY OF PHOSPHORUS: CPT | Performed by: STUDENT IN AN ORGANIZED HEALTH CARE EDUCATION/TRAINING PROGRAM

## 2023-01-10 PROCEDURE — 80048 BASIC METABOLIC PNL TOTAL CA: CPT | Performed by: STUDENT IN AN ORGANIZED HEALTH CARE EDUCATION/TRAINING PROGRAM

## 2023-01-10 RX ORDER — CEFTRIAXONE SODIUM 1 G/50ML
1 INJECTION, SOLUTION INTRAVENOUS EVERY 24 HOURS
Status: DISCONTINUED | OUTPATIENT
Start: 2023-01-10 | End: 2023-01-13 | Stop reason: HOSPADM

## 2023-01-10 RX ORDER — ALENDRONATE SODIUM 70 MG/1
70 TABLET ORAL
COMMUNITY
Start: 2022-10-11 | End: 2023-01-13 | Stop reason: HOSPADM

## 2023-01-10 RX ORDER — CALCIUM CARBONATE/VITAMIN D3 500 MG-10
1 TABLET ORAL DAILY
COMMUNITY

## 2023-01-10 RX ORDER — ALUMINA, MAGNESIA, AND SIMETHICONE 2400; 2400; 240 MG/30ML; MG/30ML; MG/30ML
15 SUSPENSION ORAL EVERY 6 HOURS PRN
Status: DISCONTINUED | OUTPATIENT
Start: 2023-01-10 | End: 2023-01-13 | Stop reason: HOSPADM

## 2023-01-10 RX ORDER — ONDANSETRON 2 MG/ML
4 INJECTION INTRAMUSCULAR; INTRAVENOUS EVERY 6 HOURS PRN
Status: DISCONTINUED | OUTPATIENT
Start: 2023-01-10 | End: 2023-01-13 | Stop reason: HOSPADM

## 2023-01-10 RX ORDER — ACETAMINOPHEN 325 MG/1
650 TABLET ORAL EVERY 4 HOURS PRN
Status: DISCONTINUED | OUTPATIENT
Start: 2023-01-10 | End: 2023-01-13 | Stop reason: HOSPADM

## 2023-01-10 RX ORDER — SODIUM BICARBONATE IN D5W 150/1000ML
150 PLASTIC BAG, INJECTION (ML) INTRAVENOUS CONTINUOUS
Status: DISCONTINUED | OUTPATIENT
Start: 2023-01-10 | End: 2023-01-11

## 2023-01-10 RX ORDER — CHOLECALCIFEROL (VITAMIN D3) 125 MCG
5 CAPSULE ORAL NIGHTLY PRN
Status: DISCONTINUED | OUTPATIENT
Start: 2023-01-10 | End: 2023-01-13 | Stop reason: HOSPADM

## 2023-01-10 RX ORDER — POLYETHYLENE GLYCOL 3350 17 G/17G
17 POWDER, FOR SOLUTION ORAL DAILY PRN
Status: DISCONTINUED | OUTPATIENT
Start: 2023-01-10 | End: 2023-01-13 | Stop reason: HOSPADM

## 2023-01-10 RX ORDER — BISACODYL 10 MG
10 SUPPOSITORY, RECTAL RECTAL DAILY PRN
Status: DISCONTINUED | OUTPATIENT
Start: 2023-01-10 | End: 2023-01-13 | Stop reason: HOSPADM

## 2023-01-10 RX ORDER — SODIUM CHLORIDE 9 MG/ML
40 INJECTION, SOLUTION INTRAVENOUS AS NEEDED
Status: DISCONTINUED | OUTPATIENT
Start: 2023-01-10 | End: 2023-01-13 | Stop reason: HOSPADM

## 2023-01-10 RX ORDER — HYDROCODONE BITARTRATE AND ACETAMINOPHEN 10; 325 MG/1; MG/1
1 TABLET ORAL EVERY 4 HOURS PRN
Status: DISCONTINUED | OUTPATIENT
Start: 2023-01-10 | End: 2023-01-13 | Stop reason: HOSPADM

## 2023-01-10 RX ORDER — BISACODYL 5 MG/1
5 TABLET, DELAYED RELEASE ORAL DAILY PRN
Status: DISCONTINUED | OUTPATIENT
Start: 2023-01-10 | End: 2023-01-13 | Stop reason: HOSPADM

## 2023-01-10 RX ORDER — HYDROCODONE BITARTRATE AND ACETAMINOPHEN 5; 325 MG/1; MG/1
1 TABLET ORAL EVERY 4 HOURS PRN
Status: DISCONTINUED | OUTPATIENT
Start: 2023-01-10 | End: 2023-01-13 | Stop reason: HOSPADM

## 2023-01-10 RX ORDER — ACETAMINOPHEN 650 MG/1
650 SUPPOSITORY RECTAL EVERY 4 HOURS PRN
Status: DISCONTINUED | OUTPATIENT
Start: 2023-01-10 | End: 2023-01-13 | Stop reason: HOSPADM

## 2023-01-10 RX ORDER — FAMOTIDINE 20 MG/1
40 TABLET, FILM COATED ORAL DAILY
Status: DISCONTINUED | OUTPATIENT
Start: 2023-01-10 | End: 2023-01-13 | Stop reason: HOSPADM

## 2023-01-10 RX ORDER — NALOXONE HCL 0.4 MG/ML
0.4 VIAL (ML) INJECTION
Status: DISCONTINUED | OUTPATIENT
Start: 2023-01-10 | End: 2023-01-13 | Stop reason: HOSPADM

## 2023-01-10 RX ORDER — ACETAMINOPHEN 160 MG/5ML
650 SOLUTION ORAL EVERY 4 HOURS PRN
Status: DISCONTINUED | OUTPATIENT
Start: 2023-01-10 | End: 2023-01-13 | Stop reason: HOSPADM

## 2023-01-10 RX ORDER — ALPRAZOLAM 0.25 MG/1
0.5 TABLET ORAL EVERY 8 HOURS PRN
Status: DISCONTINUED | OUTPATIENT
Start: 2023-01-10 | End: 2023-01-13 | Stop reason: HOSPADM

## 2023-01-10 RX ORDER — ENOXAPARIN SODIUM 100 MG/ML
30 INJECTION SUBCUTANEOUS EVERY 24 HOURS
Status: DISCONTINUED | OUTPATIENT
Start: 2023-01-10 | End: 2023-01-13 | Stop reason: HOSPADM

## 2023-01-10 RX ORDER — ATORVASTATIN CALCIUM 40 MG/1
40 TABLET, FILM COATED ORAL NIGHTLY
Status: DISCONTINUED | OUTPATIENT
Start: 2023-01-10 | End: 2023-01-13 | Stop reason: HOSPADM

## 2023-01-10 RX ORDER — SODIUM CHLORIDE 0.9 % (FLUSH) 0.9 %
10 SYRINGE (ML) INJECTION EVERY 12 HOURS SCHEDULED
Status: DISCONTINUED | OUTPATIENT
Start: 2023-01-10 | End: 2023-01-13 | Stop reason: HOSPADM

## 2023-01-10 RX ORDER — AMOXICILLIN 250 MG
2 CAPSULE ORAL 2 TIMES DAILY
Status: DISCONTINUED | OUTPATIENT
Start: 2023-01-10 | End: 2023-01-13 | Stop reason: HOSPADM

## 2023-01-10 RX ORDER — SODIUM CHLORIDE 0.9 % (FLUSH) 0.9 %
10 SYRINGE (ML) INJECTION AS NEEDED
Status: DISCONTINUED | OUTPATIENT
Start: 2023-01-10 | End: 2023-01-13 | Stop reason: HOSPADM

## 2023-01-10 RX ADMIN — ATORVASTATIN CALCIUM 40 MG: 40 TABLET, FILM COATED ORAL at 03:07

## 2023-01-10 RX ADMIN — SODIUM ZIRCONIUM CYCLOSILICATE 10 G: 10 POWDER, FOR SUSPENSION ORAL at 04:31

## 2023-01-10 RX ADMIN — CEFTRIAXONE SODIUM 1 G: 1 INJECTION, SOLUTION INTRAVENOUS at 21:08

## 2023-01-10 RX ADMIN — FAMOTIDINE 40 MG: 20 TABLET, FILM COATED ORAL at 10:00

## 2023-01-10 RX ADMIN — Medication 10 ML: at 10:01

## 2023-01-10 RX ADMIN — Medication 150 MEQ: at 21:47

## 2023-01-10 RX ADMIN — ATORVASTATIN CALCIUM 40 MG: 40 TABLET, FILM COATED ORAL at 21:08

## 2023-01-10 RX ADMIN — Medication 10 ML: at 21:14

## 2023-01-10 RX ADMIN — ENOXAPARIN SODIUM 30 MG: 100 INJECTION SUBCUTANEOUS at 10:00

## 2023-01-10 RX ADMIN — Medication 150 MEQ: at 10:00

## 2023-01-10 RX ADMIN — HYDROCODONE BITARTRATE AND ACETAMINOPHEN 1 TABLET: 5; 325 TABLET ORAL at 04:31

## 2023-01-10 NOTE — PAYOR COMM NOTE
"Shaw Sylvester (77 y.o. Female)     Date of Birth   1945    Social Security Number       Address   [address] [place], [place] [address]    Home Phone   [phone]    MRN   [id number]       Latter-day   Unknown    Marital Status                               Admission Date   1/9/23    Admission Type   Emergency    Admitting Provider   Louis Fernandez MD    Attending Provider   Louis Fernandez MD    Department, Room/Bed   [facility], 207/1       Discharge Date       Discharge Disposition       Discharge Destination                               Attending Provider: Louis Fernandez MD    Allergies: No Known Allergies    Isolation: None   Infection: None   Code Status: CPR    Ht: 149.9 cm (59.02\")   Wt: 38.9 kg (85 lb 12.1 oz)    Admission Cmt: None   Principal Problem: Urinary tract infection without hematuria, site unspecified [N39.0]                 Active Insurance as of 1/9/2023     Primary Coverage     Payor Plan Insurance Group Employer/Plan Group    HUMANA MEDICARE REPLACEMENT HUMANA MEDICARE REPLACEMENT [id number]     Payor Plan Address Payor Plan Phone Number Payor Plan Fax Number Effective Dates    [address] [address] [phone]  1/1/2022 - None Entered    [facility] [phone]       Subscriber Name Subscriber Birth Date Member ID       SHAW SYLVESTER 1945 [id number]                 Emergency Contacts      (Rel.) Home Phone Work Phone Mobile Phone    PAUL MORALES (Daughter) [phone] -- --        #[id number] pended case    CONTACT   MEGGAN [facility] UTILIZATION REVIEW    [place] [address] [place] [address]  TAX ID [id number]  NP  [id number]       [phone]   [phone]    History & Physical    No notes of this type exist for this encounter.            Emergency Department Notes      Servando Max MD at 01/09/23 2030          Time: 8:30 PM EST  Date of encounter:  " 1/9/2023  Independent Historian/Clinical History and Information was obtained by:   Patient  Chief Complaint   Patient presents with   • Back Pain     Pt reports low back pain x 3 wks. Pt states burning with urination starting only today       History is limited by: N/A    History of Present Illness:  Patient is a 77 y.o. year old female who presents to the emergency department for evaluation of bilateral lower back pain x3 weeks.  Patient has also recently had burning with urination, diarrhea, and vomiting.  Patient vomited once this morning and is reported to be an isolated event.  Patient states she saw her primary care provider a few weeks ago they did an x-ray and saw no stone however she is continued to become more ill . She denies chest pain and breathing issues      HPI    Patient Care Team  Primary Care Provider: Provider, No Known    Past Medical History:     No Known Allergies  Past Medical History:   Diagnosis Date   • COPD (chronic obstructive pulmonary disease) (HCC)    • Hyperlipidemia    • Hypertension      History reviewed. No pertinent surgical history.  History reviewed. No pertinent family history.    Home Medications:  Prior to Admission medications    Medication Sig Start Date End Date Taking? Authorizing Provider   acetaminophen (TYLENOL) 500 MG tablet Take 500 mg by mouth Every 6 (Six) Hours As Needed for Mild Pain .    ProviderPaul MD   atorvastatin (LIPITOR) 40 MG tablet Take 40 mg by mouth Daily.    ProviderPaul MD   busPIRone (BUSPAR) 10 MG tablet  5/19/22   Paul Brown MD   cyclobenzaprine (FLEXERIL) 10 MG tablet  5/5/22   Paul Brown MD   lidocaine (LIDODERM) 5 % Place 1 patch on the skin as directed by provider Daily. Remove & Discard patch within 12 hours or as directed by MD 5/8/22   Elisabeth Membreno APRN   lisinopril (PRINIVIL,ZESTRIL) 40 MG tablet  6/3/22   Paul Brown MD   methocarbamol (ROBAXIN) 500 MG tablet Take 1 tablet by mouth 3  "(Three) Times a Day As Needed for Muscle Spasms (right hip pain). 5/8/22   Elisabeth Membreno APRN   metoprolol succinate XL (TOPROL-XL) 25 MG 24 hr tablet  5/19/22   Provider, MD Paul        Social History:   Social History     Tobacco Use   • Smoking status: Every Day     Packs/day: 0.50     Types: Cigarettes   • Smokeless tobacco: Never   Substance Use Topics   • Alcohol use: Yes         Review of Systems:  Review of Systems   Constitutional: Negative for chills and fever.   HENT: Negative for congestion, ear pain and sore throat.    Eyes: Negative for pain.   Respiratory: Negative for cough, chest tightness and shortness of breath.    Cardiovascular: Negative for chest pain.   Gastrointestinal: Positive for diarrhea, nausea and vomiting. Negative for abdominal pain.   Genitourinary: Positive for dysuria. Negative for flank pain and hematuria.   Musculoskeletal: Positive for back pain. Negative for joint swelling.   Skin: Negative for pallor.   Neurological: Negative for seizures and headaches.   All other systems reviewed and are negative.       Physical Exam:  /53 (BP Location: Right arm, Patient Position: Lying)   Pulse 100   Temp 98.2 °F (36.8 °C) (Oral)   Resp 20   Ht 149.9 cm (59.02\")   Wt 38.9 kg (85 lb 12.1 oz)   SpO2 97%   BMI 17.31 kg/m²     Physical Exam  Constitutional:       Appearance: Normal appearance.   HENT:      Head: Normocephalic and atraumatic.      Nose: Nose normal.      Mouth/Throat:      Mouth: Mucous membranes are moist.   Eyes:      Extraocular Movements: Extraocular movements intact.      Conjunctiva/sclera: Conjunctivae normal.      Pupils: Pupils are equal, round, and reactive to light.   Cardiovascular:      Rate and Rhythm: Regular rhythm. Tachycardia present.      Pulses: Normal pulses.      Heart sounds: Normal heart sounds.   Pulmonary:      Effort: Pulmonary effort is normal. No respiratory distress.      Breath sounds: Normal breath sounds.   Abdominal:      " General: There is no distension.      Palpations: Abdomen is soft.      Tenderness: There is no abdominal tenderness.   Musculoskeletal:         General: Tenderness (lower back) present. Normal range of motion.      Cervical back: Normal range of motion.   Skin:     General: Skin is warm and dry.      Capillary Refill: Capillary refill takes less than 2 seconds.   Neurological:      General: No focal deficit present.      Mental Status: She is alert and oriented to person, place, and time. Mental status is at baseline.   Psychiatric:         Mood and Affect: Mood normal.         Behavior: Behavior normal.                 Procedures:  Procedures      Medical Decision Making:      Comorbidities that affect care:    COPD, Hypertension    External Notes reviewed:    Previous Clinic Note      The following orders were placed and all results were independently analyzed by me:  Orders Placed This Encounter   Procedures   • Blood Culture - Blood,   • Blood Culture - Blood,   • Urine Culture - Urine,   • XR Chest 1 View   • CT Abdomen Pelvis Without Contrast   • Comprehensive Metabolic Panel   • Lactic Acid, Plasma   • [place] Draw   • Urinalysis With Culture If Indicated - Urine, Clean Catch   • CBC Auto Differential   • Urinalysis, Microscopic Only - Urine, Clean Catch   • STAT Lactic Acid, Reflex   • Troponin   • BNP   • VBG with Co-Ox and Electrolytes   • STAT Lactic Acid, Reflex   • Basic Metabolic Panel   • Phosphorus   • Magnesium   • Diet: Regular/House Diet; Texture: Regular Texture (IDDSI 7); Fluid Consistency: Thin (IDDSI 0)   • Undress & Gown   • Continuous Pulse Oximetry   • Vital Signs   • Cardiac Monitoring   • Vital Signs   • Up in Chair   • Activity (Specify Details)   • Turn Patient   • Up With Assistance   • Weigh Patient   • Daily Weights   • Strict Intake & Output   • Oral Care   • Saline Lock & Maintain IV Access   • Code Status and Medical Interventions:   • IP General Consult (Use specialty-specific  consult if known)   • Oxygen Therapy- Nasal Cannula; 2 LPM; Titrate for SPO2: 92%, Greater Than or Equal To   • Pulse Oximetry,  Spot   • ECG 12 Lead Rhythm Change   • Insert Peripheral IV   • Insert Peripheral IV   • Inpatient Admission   • CBC & Differential   • Green Top (Gel)   • Lavender Top   • Gold Top - SST   • Light Blue Top   • CBC & Differential       Medications Given in the Emergency Department:  Medications   sodium chloride 0.9 % flush 10 mL (has no administration in time range)   atorvastatin (LIPITOR) tablet 40 mg (40 mg Oral Given 1/10/23 0307)   sodium chloride 0.9 % flush 10 mL (has no administration in time range)   sodium chloride 0.9 % flush 10 mL (has no administration in time range)   sodium chloride 0.9 % infusion 40 mL (has no administration in time range)   acetaminophen (TYLENOL) tablet 650 mg (has no administration in time range)     Or   acetaminophen (TYLENOL) 160 MG/5ML solution 650 mg (has no administration in time range)     Or   acetaminophen (TYLENOL) suppository 650 mg (has no administration in time range)   HYDROcodone-acetaminophen (NORCO) 5-325 MG per tablet 1 tablet (1 tablet Oral Given 1/10/23 0431)   HYDROcodone-acetaminophen (NORCO)  MG per tablet 1 tablet (has no administration in time range)   HYDROmorphone (DILAUDID) injection 0.5 mg (has no administration in time range)     And   naloxone (NARCAN) injection 0.4 mg (has no administration in time range)   aluminum-magnesium hydroxide-simethicone (MAALOX MAX) 400-400-40 MG/5ML suspension 15 mL (has no administration in time range)   famotidine (PEPCID) tablet 40 mg (has no administration in time range)   ALPRAZolam (XANAX) tablet 0.5 mg (has no administration in time range)   sennosides-docusate (PERICOLACE) 8.6-50 MG per tablet 2 tablet (has no administration in time range)     And   polyethylene glycol (MIRALAX) packet 17 g (has no administration in time range)     And   bisacodyl (DULCOLAX) EC tablet 5 mg (has  no administration in time range)     And   bisacodyl (DULCOLAX) suppository 10 mg (has no administration in time range)   ondansetron (ZOFRAN) injection 4 mg (has no administration in time range)   melatonin tablet 5 mg (has no administration in time range)   Enoxaparin Sodium (LOVENOX) syringe 30 mg (has no administration in time range)   sodium chloride 0.9 % bolus 1,000 mL (0 mL Intravenous Stopped 1/9/23 2202)   calcium gluconate 1g/50ml 0.675% NaCl IV SOLN (0 g Intravenous Stopped 1/9/23 2227)   sodium bicarbonate injection 8.4% 50 mEq (50 mEq Intravenous Given 1/9/23 2228)   insulin regular (humuLIN R,novoLIN R) injection 8.4 Units (8.4 Units Intravenous Given 1/9/23 2231)   dextrose (D50W) (25 g/50 mL) IV injection 25 g (25 g Intravenous Given 1/9/23 2233)   cefTRIAXone (ROCEPHIN) IVPB 1 g (0 g Intravenous Stopped 1/9/23 2237)   sodium chloride 0.9 % bolus 1,000 mL (0 mL Intravenous Stopped 1/9/23 2325)   sodium zirconium cyclosilicate (LOKELMA) pack 10 g (10 g Oral Given 1/10/23 0431)        ED Course:    The patient was initially evaluated in the triage area where orders were placed. The patient was later dispositioned by Servando Max MD.      The patient was advised to stay for completion of workup which includes but is not limited to communication of labs and radiological results, reassessment and plan. The patient was advised that leaving prior to disposition by a provider could result in critical findings that are not communicated to the patient.     ED Course as of 01/10/23 0510   Mon Jan 09, 2023   2030   --- PROVIDER IN TRIAGE NOTE ---    Patient was seen and evaluated in triage by JHOAN úNñez.  Orders were written and the patient is currently awaiting disposition.   [MS]   2149 CT changed to without contrast due to patient's GFR, creatinine, and BUN [MS]   2250 ECG 12 Lead Rhythm Change  Sinus tachycardia with rate of 109.  Normal TX and QTc interval.  Nonspecific T wave  abnormality.  Artifact present.  No acute ST elevation or depression.  EKG interpreted by me.  No previous available for comparison. [LD]      ED Course User Index  [LD] Servando Max MD  [MS] Sandra Saini APRN       Labs:    Lab Results (last 24 hours)     Procedure Component Value Units Date/Time    CBC & Differential [[id number]]  (Abnormal) Collected: 01/09/23 2011    Specimen: Blood Updated: 01/09/23 2026    Narrative:      The following orders were created for panel order CBC & Differential.  Procedure                               Abnormality         Status                     ---------                               -----------         ------                     CBC Auto Differential[[id number]]        Abnormal            Final result                 Please view results for these tests on the individual orders.    Comprehensive Metabolic Panel [[id number]]  (Abnormal) Collected: 01/09/23 2011    Specimen: Blood Updated: 01/09/23 2124     Glucose 145 mg/dL      BUN 33 mg/dL      Creatinine 1.84 mg/dL      Sodium 135 mmol/L      Potassium 6.3 mmol/L      Chloride 98 mmol/L      CO2 22.6 mmol/L      Calcium 10.7 mg/dL      Total Protein 7.6 g/dL      Albumin 4.2 g/dL      ALT (SGPT) 33 U/L      AST (SGOT) 56 U/L      Alkaline Phosphatase 148 U/L      Total Bilirubin 0.6 mg/dL      Globulin 3.4 gm/dL      A/G Ratio 1.2 g/dL      BUN/Creatinine Ratio 17.9     Anion Gap 14.4 mmol/L      eGFR 27.6 mL/min/1.73      Comment: National Kidney Foundation and American Society of Nephrology (ASN) Task Force recommended calculation based on the Chronic Kidney Disease Epidemiology Collaboration (CKD-EPI) equation refit without adjustment for race.       Narrative:      GFR Normal >60  Chronic Kidney Disease <60  Kidney Failure <15    The GFR formula is only valid for adults with stable renal function between ages 18 and 70.    Lactic Acid, Plasma [[id number]]  (Abnormal) Collected: 01/09/23 2011    Specimen:  Blood Updated: 01/09/23 2123     Lactate 4.0 mmol/L     Blood Culture - Blood, Arm, Right [[id number]] Collected: 01/09/23 2011    Specimen: Blood from Arm, Right Updated: 01/09/23 2021    CBC Auto Differential [[id number]]  (Abnormal) Collected: 01/09/23 2011    Specimen: Blood Updated: 01/09/23 2026     WBC 16.28 10*3/mm3      RBC 4.71 10*6/mm3      Hemoglobin 15.5 g/dL      Hematocrit 46.7 %      MCV 99.2 fL      MCH 32.9 pg      MCHC 33.2 g/dL      RDW 14.2 %      RDW-SD 51.7 fl      MPV 9.8 fL      Platelets 286 10*3/mm3      Neutrophil % 90.7 %      Lymphocyte % 2.9 %      Monocyte % 5.2 %      Eosinophil % 0.1 %      Basophil % 0.6 %      Immature Grans % 0.5 %      Neutrophils, Absolute 14.76 10*3/mm3      Lymphocytes, Absolute 0.48 10*3/mm3      Monocytes, Absolute 0.85 10*3/mm3      Eosinophils, Absolute 0.01 10*3/mm3      Basophils, Absolute 0.10 10*3/mm3      Immature Grans, Absolute 0.08 10*3/mm3      nRBC 0.0 /100 WBC     Troponin [[id number]]  (Normal) Collected: 01/09/23 2011    Specimen: Blood Updated: 01/09/23 2155     Troponin T <0.010 ng/mL     Narrative:      Troponin T Reference Range:  <= 0.03 ng/mL-   Negative for AMI  >0.03 ng/mL-     Abnormal for myocardial necrosis.  Clinicians would have to utilize clinical acumen, EKG, Troponin and serial changes to determine if it is an Acute Myocardial Infarction or myocardial injury due to an underlying chronic condition.       Results may be falsely decreased if patient taking Biotin.      BNP [[id number]]  (Abnormal) Collected: 01/09/23 2011    Specimen: Blood Updated: 01/09/23 2155     proBNP 2,590.0 pg/mL     Narrative:      Among patients with dyspnea, NT-proBNP is highly sensitive for the detection of acute congestive heart failure. In addition NT-proBNP of <300 pg/ml effectively rules out acute congestive heart failure with 99% negative predictive value.      Urinalysis With Culture If Indicated - Urine, Clean Catch [[id number]]  (Abnormal)  Collected: 01/09/23 2025    Specimen: Urine, Clean Catch Updated: 01/09/23 2108     Color, UA [place]     Appearance, UA Cloudy     pH, UA --     Comment: Result not available due to interfering substances.        Specific Gravity, UA 1.025     Glucose, UA --     Comment: Result not available due to interfering substances.        Ketones, UA --     Comment: Result not available due to interfering substances.        Bilirubin, UA --     Comment: Result not available due to interfering substances.        Blood, UA --     Comment: Result not available due to interfering substances.        Protein, UA --     Comment: Result not available due to interfering substances.        Leuk Esterase, UA --     Comment: Result not available due to interfering substances.        Nitrite, UA --     Comment: Result not available due to interfering substances.        Urobilinogen, UA --     Comment: Result not available due to interfering substances.       Narrative:      In absence of clinical symptoms, the presence of pyuria, bacteria, and/or nitrites on the urinalysis result does not correlate with infection.    Urinalysis, Microscopic Only - Urine, Clean Catch [[id number]]  (Abnormal) Collected: 01/09/23 2025    Specimen: Urine, Clean Catch Updated: 01/09/23 2107     RBC, UA 0-2 /HPF      WBC, UA Too Numerous to Count /HPF      Bacteria, UA 3+ /HPF      Squamous Epithelial Cells, UA 0-2 /HPF      Hyaline Casts, UA None Seen /LPF      Methodology Automated Microscopy    Urine Culture - Urine, Urine, Clean Catch [[id number]] Collected: 01/09/23 2025    Specimen: Urine, Clean Catch Updated: 01/09/23 2107    Blood Culture - Blood, Arm, Left [[id number]] Collected: 01/09/23 2031    Specimen: Blood from Arm, Left Updated: 01/09/23 2047    STAT Lactic Acid, Reflex [[id number]]  (Abnormal) Collected: 01/09/23 2314    Specimen: Blood Updated: 01/09/23 2341     Lactate 3.5 mmol/L     VBG with Co-Ox and Electrolytes [[id number]]  (Abnormal)  Collected: 01/09/23 2314    Specimen: Venous Blood Updated: 01/09/23 2326     pH, Venous 7.275 pH Units      pCO2, Venous 50.2 mm Hg      pO2, Venous <40.5 mm Hg      HCO3, Venous 22.8 mmol/L      Base Excess, Venous -4.3 mmol/L      O2 Saturation, Venous 46.5 %      Hemoglobin, Blood Gas 12.8 g/dL      Carboxyhemoglobin 3.1 %      Methemoglobin 0.00 %      Oxyhemoglobin 45.1 %      FHHB 51.8 %      Note --     Site Venous     Modality Room Air     FIO2 21 %      Flow Rate --     Sodium, Venous 135.2 mmol/L      Potassium, Venous 4.5 mmol/L      Ionized Calcium, Arterial 1.15 mmol/L      Chloride, Venous  106 mmol/L      Glucose, Arterial 197 mg/dL      Lactate, Arterial 3.55 mmol/L     STAT Lactic Acid, Reflex [[id number]]  (Normal) Collected: 01/10/23 0221    Specimen: Blood Updated: 01/10/23 0251     Lactate 1.7 mmol/L     Basic Metabolic Panel [[id number]]  (Abnormal) Collected: 01/10/23 0221    Specimen: Blood Updated: 01/10/23 0300     Glucose 102 mg/dL      BUN 27 mg/dL      Creatinine 1.19 mg/dL      Sodium 135 mmol/L      Potassium 4.6 mmol/L      Comment: Slight hemolysis detected by analyzer. Results may be affected.        Chloride 106 mmol/L      CO2 18.9 mmol/L      Calcium 8.7 mg/dL      BUN/Creatinine Ratio 22.7     Anion Gap 10.1 mmol/L      eGFR 46.6 mL/min/1.73      Comment: National Kidney Foundation and American Society of Nephrology (ASN) Task Force recommended calculation based on the Chronic Kidney Disease Epidemiology Collaboration (CKD-EPI) equation refit without adjustment for race.       Narrative:      GFR Normal >60  Chronic Kidney Disease <60  Kidney Failure <15    The GFR formula is only valid for adults with stable renal function between ages 18 and 70.    Phosphorus [[id number]]  (Normal) Collected: 01/10/23 0221    Specimen: Blood Updated: 01/10/23 0253     Phosphorus 2.8 mg/dL     Magnesium [[id number]]  (Normal) Collected: 01/10/23 0221    Specimen: Blood Updated: 01/10/23 0253      Magnesium 2.0 mg/dL     Basic Metabolic Panel [[id number]] Collected: 01/10/23 0438    Specimen: Blood Updated: 01/10/23 0503           Imaging:    CT Abdomen Pelvis Without Contrast    Result Date: 1/9/2023  PROCEDURE: CT ABDOMEN PELVIS WO CONTRAST  COMPARISON: [facility], CT, ABD PEL W/O CONTRAST, 6/27/2017, 13:53.  INDICATIONS: lower back pain with difficulty urinating  TECHNIQUE: CT images were created without intravenous contrast.   PROTOCOL:   Standard imaging protocol performed    RADIATION:   DLP: 226.8mGy*cm   Automated exposure control was utilized to minimize radiation dose.  FINDINGS:  The lung bases are clear.  There is tortuosity of the descending thoracic aorta with calcific change of the abdominal aorta and branch vascular structures.  New  There are clips from cholecystectomy.  The unenhanced liver, bilateral adrenal glands, pancreas and spleen are normal.  There is hypertrophy of the right kidney with atrophy of the left kidney.  There are some mildly prominent small bowel loops which are fluid filled with the some questionable stranding of the mesentery adjacent to the left abdominal small bowel loops.  Enteritis would be difficult to exclude.  The colon is normal.  The pelvic visceral structures are normal.  There is no free air or free fluid within the abdomen or pelvis.  There is scoliosis convex leftward and multilevel degenerative disc disease of the lumbar spine.       A few prominent fluid-filled loops of small bowel with some adjacent inflammatory change suggestive of possible enteritis.  There is no evidence of nephroureterolithiasis.     ANITRA CALLES MD       Electronically Signed and Approved By: ANITRA CALLES MD on 1/09/2023 at 22:36             XR Chest 1 View    Result Date: 1/9/2023  PROCEDURE: XR CHEST 1 VW  COMPARISON: [facility], , CHEST AP/PA 1 VIEW, 10/21/2011, 17:53.  INDICATIONS: sob, Cough, persistent, cough  FINDINGS:  LUNGS: Normal.  No  significant pulmonary parenchymal abnormalities.  VASCULATURE: Normal.  Unremarkable pulmonary vasculature.  CARDIAC: Normal.  No cardiac silhouette abnormality or cardiomegaly.  MEDIASTINUM: Normal.  No visible mass or adenopathy.  PLEURA: Normal.  No effusion or pleural thickening.  BONES: Normal.  No fracture or visible bony lesion.  OTHER: Negative.        No acute disease.        ANITRA CALLES MD       Electronically Signed and Approved By: ANITRA CALLES MD on 1/09/2023 at 22:38                 Differential Diagnosis and Discussion:      Back Pain: The patient presents with back pain. My differential diagnosis includes but is not limited to acute spinal epidural abscess, acute spinal epidural bleed, cauda equina syndrome, abdominal aortic aneurysm, aortic dissection, kidney stone, pyelonephritis, musculoskeletal back pain, spinal fracture, and osteoarthritis.   Dysuria: Differential diagnosis includes but is not limited to urethritis, cystitis, pyelonephritis, ureteral calculi, neoplasm, chemical irritant, urethral stricture, and trauma    All labs were reviewed and analyzed by me.  All X-rays were independently reviewed by me.  EKG was interpreted by me.    Sepsis criteria was met in the emergency department and the Sepsis protocol (including antibiotic administration) was initiated.      SIRS criteria considered:   1.  Temperature > 100.4 or <98.6    2.  Heart Rate > 90    3.  Respiratory Rate > 22    4.  WBC > 12K or <4K.             Severe Sepsis:     Respiratory: Mechanical Ventilation or Bipap  Hypotension: SBP > 90 or MAP < 65  Renal: Creatinine > 2  Metabolic: Lactic Acid > 2  Hematologic: Platelets < 100K or INR > 1.5  Hepatic: BILI  >  2  CNS: Sudden AMS     Septic Shock:     Severe Sepsis + Persistent hypotension or Lactic Acid > 4     Normal saline bolus, Antibiotics, and final disposition was based on these definitions.        Sepsis was recognized at 2140    Antibiotics were ordered.     30 cc/kg  bolus was indicated.       The patient was ordered 2L of fluids.    .    MDM  Number of Diagnoses or Management Options  Hyperkalemia  Sepsis, due to unspecified organism, unspecified whether acute organ dysfunction present (HCC)  Urinary tract infection without hematuria, site unspecified  Diagnosis management comments: Patient presented to the emergency department with back pain.  On arrival she is tachycardic.  She is afebrile.  Patient has bilateral CVA tenderness.  Labs were obtained that showed an elevated white count of 16.  Potassium was also found to be elevated with a potassium 6.3.  Creatinine is 1.8.  Patient was given calcium gluconate, sodium bicarb, insulin and glucose.  She also given IV fluids.  UA consistent with urinary tract infection.  CT of abdomen pelvis showed possible enteritis no other acute findings.  VBG showed pH of 7.27 and potassium 4.5 this was after medication was given.  I discussed patient with hospitalist and she will be admitted for further care.       Amount and/or Complexity of Data Reviewed  Clinical lab tests: ordered and reviewed  Tests in the radiology section of CPT®: ordered and reviewed    Risk of Complications, Morbidity, and/or Mortality  Presenting problems: high  Management options: high         Critical Care Note: Total Critical Care time of 45 minutes. Total critical care time documented does not include time spent on separately billed procedures for services of nurses or physician assistants. I personally saw and examined the patient. I have reviewed all diagnostic interpretations and treatment plans as written. I was present for the key portions of any procedures performed and the inclusive time noted in any critical care statement. Critical care time includes patient management by me, time spent at the patients bedside,  time to review lab and imaging results, discussing patient care, documentation in the medical record, and time spent with family or  caregiver.    Patient Care Considerations:          Consultants/Shared Management Plan:    Hospitalist: I have discussed the case with Dr Fernandez who agrees to accept the patient for admission.    Social Determinants of Health:          Disposition and Care Coordination:    Admit:   Through independent evaluation of the patient's history, physical, and imperical data, the patient meets criteria for observation/admission to the hospital.        Final diagnoses:   Urinary tract infection without hematuria, site unspecified   Sepsis, due to unspecified organism, unspecified whether acute organ dysfunction present (HCC)   Hyperkalemia        ED Disposition     ED Disposition   Decision to Admit    Condition   --    Comment   Level of Care: Telemetry [5]   Diagnosis: Urinary tract infection without hematuria, site unspecified [[id number]]   Admitting Physician: ULISES FERNANDEZ [[id number]]   Attending Physician: ULISES FERNANDEZ [[id number]]   Certification: I Certify That Inpatient Hospital Services Are Medically Necessary For Greater Than 2 Midnights               This medical record created using voice recognition software.           Servando Max MD  01/10/23 0510      Electronically signed by Servando Max MD at 01/10/23 0510       Physician Progress Notes (last 24 hours)  Notes from 01/09/23 1103 through 01/10/23 1103   No notes of this type exist for this encounter.         Consult Notes (last 24 hours)  Notes from 01/09/23 1103 through 01/10/23 1103   No notes of this type exist for this encounter.

## 2023-01-10 NOTE — SIGNIFICANT NOTE
01/10/23 3105   Plan   Plan Spoke with daughter/POA, Melissa, over the phone to obtain information.  Daughter reports patient has been staying with her for the last 3 weeks.  Reports patient is able to provide own ADL's.  Daughter plans for patient to return home with her until she is feeling better.  Reports no home health or rehab at this time.  Facesheet verified.  Plans to return home with daughter

## 2023-01-10 NOTE — ED PROVIDER NOTES
Time: 8:30 PM EST  Date of encounter:  1/9/2023  Independent Historian/Clinical History and Information was obtained by:   Patient  Chief Complaint   Patient presents with   • Back Pain     Pt reports low back pain x 3 wks. Pt states burning with urination starting only today       History is limited by: N/A    History of Present Illness:  Patient is a 77 y.o. year old female who presents to the emergency department for evaluation of bilateral lower back pain x3 weeks.  Patient has also recently had burning with urination, diarrhea, and vomiting.  Patient vomited once this morning and is reported to be an isolated event.  Patient states she saw her primary care provider a few weeks ago they did an x-ray and saw no stone however she is continued to become more ill . She denies chest pain and breathing issues      HPI    Patient Care Team  Primary Care Provider: Provider, Anais Known    Past Medical History:     No Known Allergies  Past Medical History:   Diagnosis Date   • COPD (chronic obstructive pulmonary disease) (HCC)    • Hyperlipidemia    • Hypertension      History reviewed. No pertinent surgical history.  History reviewed. No pertinent family history.    Home Medications:  Prior to Admission medications    Medication Sig Start Date End Date Taking? Authorizing Provider   acetaminophen (TYLENOL) 500 MG tablet Take 500 mg by mouth Every 6 (Six) Hours As Needed for Mild Pain .    ProviderPaul MD   atorvastatin (LIPITOR) 40 MG tablet Take 40 mg by mouth Daily.    ProviderPaul MD   busPIRone (BUSPAR) 10 MG tablet  5/19/22   Paul Brown MD   cyclobenzaprine (FLEXERIL) 10 MG tablet  5/5/22   Paul Brown MD   lidocaine (LIDODERM) 5 % Place 1 patch on the skin as directed by provider Daily. Remove & Discard patch within 12 hours or as directed by MD 5/8/22   Elisabeth Membreno APRN   lisinopril (PRINIVIL,ZESTRIL) 40 MG tablet  6/3/22   Paul Brown MD   methocarbamol (ROBAXIN)  "500 MG tablet Take 1 tablet by mouth 3 (Three) Times a Day As Needed for Muscle Spasms (right hip pain). 5/8/22   Elisabeth Membreno APRN   metoprolol succinate XL (TOPROL-XL) 25 MG 24 hr tablet  5/19/22   Provider, MD Paul        Social History:   Social History     Tobacco Use   • Smoking status: Every Day     Packs/day: 0.50     Types: Cigarettes   • Smokeless tobacco: Never   Substance Use Topics   • Alcohol use: Yes         Review of Systems:  Review of Systems   Constitutional: Negative for chills and fever.   HENT: Negative for congestion, ear pain and sore throat.    Eyes: Negative for pain.   Respiratory: Negative for cough, chest tightness and shortness of breath.    Cardiovascular: Negative for chest pain.   Gastrointestinal: Positive for diarrhea, nausea and vomiting. Negative for abdominal pain.   Genitourinary: Positive for dysuria. Negative for flank pain and hematuria.   Musculoskeletal: Positive for back pain. Negative for joint swelling.   Skin: Negative for pallor.   Neurological: Negative for seizures and headaches.   All other systems reviewed and are negative.       Physical Exam:  /53 (BP Location: Right arm, Patient Position: Lying)   Pulse 100   Temp 98.2 °F (36.8 °C) (Oral)   Resp 20   Ht 149.9 cm (59.02\")   Wt 38.9 kg (85 lb 12.1 oz)   SpO2 97%   BMI 17.31 kg/m²     Physical Exam  Constitutional:       Appearance: Normal appearance.   HENT:      Head: Normocephalic and atraumatic.      Nose: Nose normal.      Mouth/Throat:      Mouth: Mucous membranes are moist.   Eyes:      Extraocular Movements: Extraocular movements intact.      Conjunctiva/sclera: Conjunctivae normal.      Pupils: Pupils are equal, round, and reactive to light.   Cardiovascular:      Rate and Rhythm: Regular rhythm. Tachycardia present.      Pulses: Normal pulses.      Heart sounds: Normal heart sounds.   Pulmonary:      Effort: Pulmonary effort is normal. No respiratory distress.      Breath sounds: " Normal breath sounds.   Abdominal:      General: There is no distension.      Palpations: Abdomen is soft.      Tenderness: There is no abdominal tenderness.   Musculoskeletal:         General: Tenderness (lower back) present. Normal range of motion.      Cervical back: Normal range of motion.   Skin:     General: Skin is warm and dry.      Capillary Refill: Capillary refill takes less than 2 seconds.   Neurological:      General: No focal deficit present.      Mental Status: She is alert and oriented to person, place, and time. Mental status is at baseline.   Psychiatric:         Mood and Affect: Mood normal.         Behavior: Behavior normal.                  Procedures:  Procedures      Medical Decision Making:      Comorbidities that affect care:    COPD, Hypertension    External Notes reviewed:    Previous Clinic Note      The following orders were placed and all results were independently analyzed by me:  Orders Placed This Encounter   Procedures   • Blood Culture - Blood,   • Blood Culture - Blood,   • Urine Culture - Urine,   • XR Chest 1 View   • CT Abdomen Pelvis Without Contrast   • Comprehensive Metabolic Panel   • Lactic Acid, Plasma   • [place] Draw   • Urinalysis With Culture If Indicated - Urine, Clean Catch   • CBC Auto Differential   • Urinalysis, Microscopic Only - Urine, Clean Catch   • STAT Lactic Acid, Reflex   • Troponin   • BNP   • VBG with Co-Ox and Electrolytes   • STAT Lactic Acid, Reflex   • Basic Metabolic Panel   • Phosphorus   • Magnesium   • Diet: Regular/House Diet; Texture: Regular Texture (IDDSI 7); Fluid Consistency: Thin (IDDSI 0)   • Undress & Gown   • Continuous Pulse Oximetry   • Vital Signs   • Cardiac Monitoring   • Vital Signs   • Up in Chair   • Activity (Specify Details)   • Turn Patient   • Up With Assistance   • Weigh Patient   • Daily Weights   • Strict Intake & Output   • Oral Care   • Saline Lock & Maintain IV Access   • Code Status and Medical Interventions:   • IP  General Consult (Use specialty-specific consult if known)   • Oxygen Therapy- Nasal Cannula; 2 LPM; Titrate for SPO2: 92%, Greater Than or Equal To   • Pulse Oximetry,  Spot   • ECG 12 Lead Rhythm Change   • Insert Peripheral IV   • Insert Peripheral IV   • Inpatient Admission   • CBC & Differential   • Green Top (Gel)   • Lavender Top   • Gold Top - SST   • Light Blue Top   • CBC & Differential       Medications Given in the Emergency Department:  Medications   sodium chloride 0.9 % flush 10 mL (has no administration in time range)   atorvastatin (LIPITOR) tablet 40 mg (40 mg Oral Given 1/10/23 0307)   sodium chloride 0.9 % flush 10 mL (has no administration in time range)   sodium chloride 0.9 % flush 10 mL (has no administration in time range)   sodium chloride 0.9 % infusion 40 mL (has no administration in time range)   acetaminophen (TYLENOL) tablet 650 mg (has no administration in time range)     Or   acetaminophen (TYLENOL) 160 MG/5ML solution 650 mg (has no administration in time range)     Or   acetaminophen (TYLENOL) suppository 650 mg (has no administration in time range)   HYDROcodone-acetaminophen (NORCO) 5-325 MG per tablet 1 tablet (1 tablet Oral Given 1/10/23 0431)   HYDROcodone-acetaminophen (NORCO)  MG per tablet 1 tablet (has no administration in time range)   HYDROmorphone (DILAUDID) injection 0.5 mg (has no administration in time range)     And   naloxone (NARCAN) injection 0.4 mg (has no administration in time range)   aluminum-magnesium hydroxide-simethicone (MAALOX MAX) 400-400-40 MG/5ML suspension 15 mL (has no administration in time range)   famotidine (PEPCID) tablet 40 mg (has no administration in time range)   ALPRAZolam (XANAX) tablet 0.5 mg (has no administration in time range)   sennosides-docusate (PERICOLACE) 8.6-50 MG per tablet 2 tablet (has no administration in time range)     And   polyethylene glycol (MIRALAX) packet 17 g (has no administration in time range)     And    bisacodyl (DULCOLAX) EC tablet 5 mg (has no administration in time range)     And   bisacodyl (DULCOLAX) suppository 10 mg (has no administration in time range)   ondansetron (ZOFRAN) injection 4 mg (has no administration in time range)   melatonin tablet 5 mg (has no administration in time range)   Enoxaparin Sodium (LOVENOX) syringe 30 mg (has no administration in time range)   sodium chloride 0.9 % bolus 1,000 mL (0 mL Intravenous Stopped 1/9/23 2202)   calcium gluconate 1g/50ml 0.675% NaCl IV SOLN (0 g Intravenous Stopped 1/9/23 2227)   sodium bicarbonate injection 8.4% 50 mEq (50 mEq Intravenous Given 1/9/23 2228)   insulin regular (humuLIN R,novoLIN R) injection 8.4 Units (8.4 Units Intravenous Given 1/9/23 2231)   dextrose (D50W) (25 g/50 mL) IV injection 25 g (25 g Intravenous Given 1/9/23 2233)   cefTRIAXone (ROCEPHIN) IVPB 1 g (0 g Intravenous Stopped 1/9/23 2237)   sodium chloride 0.9 % bolus 1,000 mL (0 mL Intravenous Stopped 1/9/23 2325)   sodium zirconium cyclosilicate (LOKELMA) pack 10 g (10 g Oral Given 1/10/23 0431)        ED Course:    The patient was initially evaluated in the triage area where orders were placed. The patient was later dispositioned by Servando Max MD.      The patient was advised to stay for completion of workup which includes but is not limited to communication of labs and radiological results, reassessment and plan. The patient was advised that leaving prior to disposition by a provider could result in critical findings that are not communicated to the patient.     ED Course as of 01/10/23 0510   Mon Jan 09, 2023   2030   --- PROVIDER IN TRIAGE NOTE ---    Patient was seen and evaluated in triage by JHOAN Núñez.  Orders were written and the patient is currently awaiting disposition.   [MS]   2149 CT changed to without contrast due to patient's GFR, creatinine, and BUN [MS]   2250 ECG 12 Lead Rhythm Change  Sinus tachycardia with rate of 109.  Normal IL and  QTc interval.  Nonspecific T wave abnormality.  Artifact present.  No acute ST elevation or depression.  EKG interpreted by me.  No previous available for comparison. [LD]      ED Course User Index  [LD] Servando Max MD  [MS] Sandra Saini APRN       Labs:    Lab Results (last 24 hours)     Procedure Component Value Units Date/Time    CBC & Differential [[id number]]  (Abnormal) Collected: 01/09/23 2011    Specimen: Blood Updated: 01/09/23 2026    Narrative:      The following orders were created for panel order CBC & Differential.  Procedure                               Abnormality         Status                     ---------                               -----------         ------                     CBC Auto Differential[[id number]]        Abnormal            Final result                 Please view results for these tests on the individual orders.    Comprehensive Metabolic Panel [[id number]]  (Abnormal) Collected: 01/09/23 2011    Specimen: Blood Updated: 01/09/23 2124     Glucose 145 mg/dL      BUN 33 mg/dL      Creatinine 1.84 mg/dL      Sodium 135 mmol/L      Potassium 6.3 mmol/L      Chloride 98 mmol/L      CO2 22.6 mmol/L      Calcium 10.7 mg/dL      Total Protein 7.6 g/dL      Albumin 4.2 g/dL      ALT (SGPT) 33 U/L      AST (SGOT) 56 U/L      Alkaline Phosphatase 148 U/L      Total Bilirubin 0.6 mg/dL      Globulin 3.4 gm/dL      A/G Ratio 1.2 g/dL      BUN/Creatinine Ratio 17.9     Anion Gap 14.4 mmol/L      eGFR 27.6 mL/min/1.73      Comment: National Kidney Foundation and American Society of Nephrology (ASN) Task Force recommended calculation based on the Chronic Kidney Disease Epidemiology Collaboration (CKD-EPI) equation refit without adjustment for race.       Narrative:      GFR Normal >60  Chronic Kidney Disease <60  Kidney Failure <15    The GFR formula is only valid for adults with stable renal function between ages 18 and 70.    Lactic Acid, Plasma [[id number]]  (Abnormal)  Collected: 01/09/23 2011    Specimen: Blood Updated: 01/09/23 2123     Lactate 4.0 mmol/L     Blood Culture - Blood, Arm, Right [[id number]] Collected: 01/09/23 2011    Specimen: Blood from Arm, Right Updated: 01/09/23 2021    CBC Auto Differential [[id number]]  (Abnormal) Collected: 01/09/23 2011    Specimen: Blood Updated: 01/09/23 2026     WBC 16.28 10*3/mm3      RBC 4.71 10*6/mm3      Hemoglobin 15.5 g/dL      Hematocrit 46.7 %      MCV 99.2 fL      MCH 32.9 pg      MCHC 33.2 g/dL      RDW 14.2 %      RDW-SD 51.7 fl      MPV 9.8 fL      Platelets 286 10*3/mm3      Neutrophil % 90.7 %      Lymphocyte % 2.9 %      Monocyte % 5.2 %      Eosinophil % 0.1 %      Basophil % 0.6 %      Immature Grans % 0.5 %      Neutrophils, Absolute 14.76 10*3/mm3      Lymphocytes, Absolute 0.48 10*3/mm3      Monocytes, Absolute 0.85 10*3/mm3      Eosinophils, Absolute 0.01 10*3/mm3      Basophils, Absolute 0.10 10*3/mm3      Immature Grans, Absolute 0.08 10*3/mm3      nRBC 0.0 /100 WBC     Troponin [[id number]]  (Normal) Collected: 01/09/23 2011    Specimen: Blood Updated: 01/09/23 2155     Troponin T <0.010 ng/mL     Narrative:      Troponin T Reference Range:  <= 0.03 ng/mL-   Negative for AMI  >0.03 ng/mL-     Abnormal for myocardial necrosis.  Clinicians would have to utilize clinical acumen, EKG, Troponin and serial changes to determine if it is an Acute Myocardial Infarction or myocardial injury due to an underlying chronic condition.       Results may be falsely decreased if patient taking Biotin.      BNP [[id number]]  (Abnormal) Collected: 01/09/23 2011    Specimen: Blood Updated: 01/09/23 2155     proBNP 2,590.0 pg/mL     Narrative:      Among patients with dyspnea, NT-proBNP is highly sensitive for the detection of acute congestive heart failure. In addition NT-proBNP of <300 pg/ml effectively rules out acute congestive heart failure with 99% negative predictive value.      Urinalysis With Culture If Indicated - Urine, Clean  Catch [[id number]]  (Abnormal) Collected: 01/09/23 2025    Specimen: Urine, Clean Catch Updated: 01/09/23 2108     Color, UA [place]     Appearance, UA Cloudy     pH, UA --     Comment: Result not available due to interfering substances.        Specific Gravity, UA 1.025     Glucose, UA --     Comment: Result not available due to interfering substances.        Ketones, UA --     Comment: Result not available due to interfering substances.        Bilirubin, UA --     Comment: Result not available due to interfering substances.        Blood, UA --     Comment: Result not available due to interfering substances.        Protein, UA --     Comment: Result not available due to interfering substances.        Leuk Esterase, UA --     Comment: Result not available due to interfering substances.        Nitrite, UA --     Comment: Result not available due to interfering substances.        Urobilinogen, UA --     Comment: Result not available due to interfering substances.       Narrative:      In absence of clinical symptoms, the presence of pyuria, bacteria, and/or nitrites on the urinalysis result does not correlate with infection.    Urinalysis, Microscopic Only - Urine, Clean Catch [[id number]]  (Abnormal) Collected: 01/09/23 2025    Specimen: Urine, Clean Catch Updated: 01/09/23 2107     RBC, UA 0-2 /HPF      WBC, UA Too Numerous to Count /HPF      Bacteria, UA 3+ /HPF      Squamous Epithelial Cells, UA 0-2 /HPF      Hyaline Casts, UA None Seen /LPF      Methodology Automated Microscopy    Urine Culture - Urine, Urine, Clean Catch [[id number]] Collected: 01/09/23 2025    Specimen: Urine, Clean Catch Updated: 01/09/23 2107    Blood Culture - Blood, Arm, Left [[id number]] Collected: 01/09/23 2031    Specimen: Blood from Arm, Left Updated: 01/09/23 2047    STAT Lactic Acid, Reflex [[id number]]  (Abnormal) Collected: 01/09/23 2314    Specimen: Blood Updated: 01/09/23 2341     Lactate 3.5 mmol/L     VBG with Co-Ox and Electrolytes  [[id number]]  (Abnormal) Collected: 01/09/23 2314    Specimen: Venous Blood Updated: 01/09/23 2326     pH, Venous 7.275 pH Units      pCO2, Venous 50.2 mm Hg      pO2, Venous <40.5 mm Hg      HCO3, Venous 22.8 mmol/L      Base Excess, Venous -4.3 mmol/L      O2 Saturation, Venous 46.5 %      Hemoglobin, Blood Gas 12.8 g/dL      Carboxyhemoglobin 3.1 %      Methemoglobin 0.00 %      Oxyhemoglobin 45.1 %      FHHB 51.8 %      Note --     Site Venous     Modality Room Air     FIO2 21 %      Flow Rate --     Sodium, Venous 135.2 mmol/L      Potassium, Venous 4.5 mmol/L      Ionized Calcium, Arterial 1.15 mmol/L      Chloride, Venous  106 mmol/L      Glucose, Arterial 197 mg/dL      Lactate, Arterial 3.55 mmol/L     STAT Lactic Acid, Reflex [[id number]]  (Normal) Collected: 01/10/23 0221    Specimen: Blood Updated: 01/10/23 0251     Lactate 1.7 mmol/L     Basic Metabolic Panel [[id number]]  (Abnormal) Collected: 01/10/23 0221    Specimen: Blood Updated: 01/10/23 0300     Glucose 102 mg/dL      BUN 27 mg/dL      Creatinine 1.19 mg/dL      Sodium 135 mmol/L      Potassium 4.6 mmol/L      Comment: Slight hemolysis detected by analyzer. Results may be affected.        Chloride 106 mmol/L      CO2 18.9 mmol/L      Calcium 8.7 mg/dL      BUN/Creatinine Ratio 22.7     Anion Gap 10.1 mmol/L      eGFR 46.6 mL/min/1.73      Comment: National Kidney Foundation and American Society of Nephrology (ASN) Task Force recommended calculation based on the Chronic Kidney Disease Epidemiology Collaboration (CKD-EPI) equation refit without adjustment for race.       Narrative:      GFR Normal >60  Chronic Kidney Disease <60  Kidney Failure <15    The GFR formula is only valid for adults with stable renal function between ages 18 and 70.    Phosphorus [[id number]]  (Normal) Collected: 01/10/23 0221    Specimen: Blood Updated: 01/10/23 0253     Phosphorus 2.8 mg/dL     Magnesium [[id number]]  (Normal) Collected: 01/10/23 0221    Specimen: Blood  Updated: 01/10/23 0253     Magnesium 2.0 mg/dL     Basic Metabolic Panel [[id number]] Collected: 01/10/23 0438    Specimen: Blood Updated: 01/10/23 0503           Imaging:    CT Abdomen Pelvis Without Contrast    Result Date: 1/9/2023  PROCEDURE: CT ABDOMEN PELVIS WO CONTRAST  COMPARISON: [facility], CT, ABD PEL W/O CONTRAST, 6/27/2017, 13:53.  INDICATIONS: lower back pain with difficulty urinating  TECHNIQUE: CT images were created without intravenous contrast.   PROTOCOL:   Standard imaging protocol performed    RADIATION:   DLP: 226.8mGy*cm   Automated exposure control was utilized to minimize radiation dose.  FINDINGS:  The lung bases are clear.  There is tortuosity of the descending thoracic aorta with calcific change of the abdominal aorta and branch vascular structures.  New  There are clips from cholecystectomy.  The unenhanced liver, bilateral adrenal glands, pancreas and spleen are normal.  There is hypertrophy of the right kidney with atrophy of the left kidney.  There are some mildly prominent small bowel loops which are fluid filled with the some questionable stranding of the mesentery adjacent to the left abdominal small bowel loops.  Enteritis would be difficult to exclude.  The colon is normal.  The pelvic visceral structures are normal.  There is no free air or free fluid within the abdomen or pelvis.  There is scoliosis convex leftward and multilevel degenerative disc disease of the lumbar spine.       A few prominent fluid-filled loops of small bowel with some adjacent inflammatory change suggestive of possible enteritis.  There is no evidence of nephroureterolithiasis.     ANITRA CALLES MD       Electronically Signed and Approved By: ANITRA CALLES MD on 1/09/2023 at 22:36             XR Chest 1 View    Result Date: 1/9/2023  PROCEDURE: XR CHEST 1 VW  COMPARISON: [facility], CR, CHEST AP/PA 1 VIEW, 10/21/2011, 17:53.  INDICATIONS: sob, Cough, persistent, cough   FINDINGS:  LUNGS: Normal.  No significant pulmonary parenchymal abnormalities.  VASCULATURE: Normal.  Unremarkable pulmonary vasculature.  CARDIAC: Normal.  No cardiac silhouette abnormality or cardiomegaly.  MEDIASTINUM: Normal.  No visible mass or adenopathy.  PLEURA: Normal.  No effusion or pleural thickening.  BONES: Normal.  No fracture or visible bony lesion.  OTHER: Negative.        No acute disease.        ANITRA CALLES MD       Electronically Signed and Approved By: ANITRA CALLES MD on 1/09/2023 at 22:38                 Differential Diagnosis and Discussion:      Back Pain: The patient presents with back pain. My differential diagnosis includes but is not limited to acute spinal epidural abscess, acute spinal epidural bleed, cauda equina syndrome, abdominal aortic aneurysm, aortic dissection, kidney stone, pyelonephritis, musculoskeletal back pain, spinal fracture, and osteoarthritis.   Dysuria: Differential diagnosis includes but is not limited to urethritis, cystitis, pyelonephritis, ureteral calculi, neoplasm, chemical irritant, urethral stricture, and trauma    All labs were reviewed and analyzed by me.  All X-rays were independently reviewed by me.  EKG was interpreted by me.    Sepsis criteria was met in the emergency department and the Sepsis protocol (including antibiotic administration) was initiated.      SIRS criteria considered:   1.  Temperature > 100.4 or <98.6    2.  Heart Rate > 90    3.  Respiratory Rate > 22    4.  WBC > 12K or <4K.             Severe Sepsis:     Respiratory: Mechanical Ventilation or Bipap  Hypotension: SBP > 90 or MAP < 65  Renal: Creatinine > 2  Metabolic: Lactic Acid > 2  Hematologic: Platelets < 100K or INR > 1.5  Hepatic: BILI  >  2  CNS: Sudden AMS     Septic Shock:     Severe Sepsis + Persistent hypotension or Lactic Acid > 4     Normal saline bolus, Antibiotics, and final disposition was based on these definitions.        Sepsis was recognized at 2140    Antibiotics  were ordered.     30 cc/kg bolus was indicated.       The patient was ordered 2L of fluids.    .    MDM  Number of Diagnoses or Management Options  Hyperkalemia  Sepsis, due to unspecified organism, unspecified whether acute organ dysfunction present (HCC)  Urinary tract infection without hematuria, site unspecified  Diagnosis management comments: Patient presented to the emergency department with back pain.  On arrival she is tachycardic.  She is afebrile.  Patient has bilateral CVA tenderness.  Labs were obtained that showed an elevated white count of 16.  Potassium was also found to be elevated with a potassium 6.3.  Creatinine is 1.8.  Patient was given calcium gluconate, sodium bicarb, insulin and glucose.  She also given IV fluids.  UA consistent with urinary tract infection.  CT of abdomen pelvis showed possible enteritis no other acute findings.  VBG showed pH of 7.27 and potassium 4.5 this was after medication was given.  I discussed patient with hospitalist and she will be admitted for further care.       Amount and/or Complexity of Data Reviewed  Clinical lab tests: ordered and reviewed  Tests in the radiology section of CPT®: ordered and reviewed    Risk of Complications, Morbidity, and/or Mortality  Presenting problems: high  Management options: high         Critical Care Note: Total Critical Care time of 45 minutes. Total critical care time documented does not include time spent on separately billed procedures for services of nurses or physician assistants. I personally saw and examined the patient. I have reviewed all diagnostic interpretations and treatment plans as written. I was present for the key portions of any procedures performed and the inclusive time noted in any critical care statement. Critical care time includes patient management by me, time spent at the patients bedside,  time to review lab and imaging results, discussing patient care, documentation in the medical record, and time spent  with family or caregiver.    Patient Care Considerations:          Consultants/Shared Management Plan:    Hospitalist: I have discussed the case with Dr Fernandez who agrees to accept the patient for admission.    Social Determinants of Health:          Disposition and Care Coordination:    Admit:   Through independent evaluation of the patient's history, physical, and imperical data, the patient meets criteria for observation/admission to the hospital.        Final diagnoses:   Urinary tract infection without hematuria, site unspecified   Sepsis, due to unspecified organism, unspecified whether acute organ dysfunction present (HCC)   Hyperkalemia        ED Disposition     ED Disposition   Decision to Admit    Condition   --    Comment   Level of Care: Telemetry [5]   Diagnosis: Urinary tract infection without hematuria, site unspecified [[id number]]   Admitting Physician: ULISES FERNANDEZ [[id number]]   Attending Physician: ULISES FERNANDEZ [[id number]]   Certification: I Certify That Inpatient Hospital Services Are Medically Necessary For Greater Than 2 Midnights               This medical record created using voice recognition software.           Servando Max MD  01/10/23 0510

## 2023-01-10 NOTE — H&P
[facility]   HISTORY AND PHYSICAL    Patient Name: Sandra Sylvester  : 1945  MRN: [id number]  Primary Care Physician:  Provider, No Known  Date of admission: 2023    Subjective   Subjective     Chief Complaint: Weakness    HPI:    Sandra Sylvester is a 77 y.o. female With past medical history of hypertension, hyperlipidemia, depression who was in her usual state of health when she started experiencing lower back pain for the last 3 weeks.  She also had associated dysuria nausea and vomiting.  That occurred on the morning of presentation to the ER.  She states that she continued to feel weak fatigued and due to worsening symptoms presented to the ER.  She did not endorse any fevers chills or falls.  Patient states that her p.o. intake has been poor    In the ER she was noted to have a white count of 16 with creatinine rise to 1.84 from an unknown baseline, no prior history of kidney disease, potassium of 6.3 and a dirty urine analysis.  Her lactate was also elevated at 3.5 which improved with IV fluids.  She was admitted for further management of sepsis secondary to UTI    Review of Systems  Constitutional:        Weakness tiredness fatigue  Eyes:                       No blurry vision, eye discharge, eye irritation, eye pain  HEENT:                   No acute hair loss, earache and discharge, nasal congestion or discharge, sore throat, postnasal drip  Respiratory:           No shortness of breath coughing sputum production wheezing hemoptysis pleuritic chest pain  Cardiovascular:     No chest pain, orthopnea, PND, dizziness, palpitation, lower extremity edema  Gastrointestinal:   No nausea vomiting diarrhea abdominal pain constipation  Genitourinary:       No urinary incontinence, hesitancy, frequency, urgency, dysuria  Neurological:        No confusion, headache, focal weakness, numbness, dysphasia  Hematologic:         No bruising, bleeding, pallor, lymphadenopathy  Endocrine:            No  coldness, hot flashes, polyuria, abnormal hair growth  Musculoskeletal:    No body pains, aches, arthritic pains, muscle pain ,muscle wasting  Psychiatric:          No low or high mood, anxiety, hallucinations, delusions  Skin.                      No rash, ulcers, bruising, itching    Personal History     Past Medical History:   Diagnosis Date   • COPD (chronic obstructive pulmonary disease) (HCC)    • Hyperlipidemia    • Hypertension        History reviewed. No pertinent surgical history.    Family History: family history is not on file. Otherwise pertinent FHx was reviewed and not pertinent to current issue.    Social History:  reports that she has been smoking cigarettes. She has been smoking an average of .5 packs per day. She has never used smokeless tobacco. She reports current alcohol use.    Home Medications:  HYDROcodone-acetaminophen, acetaminophen, atorvastatin, busPIRone, cyclobenzaprine, lidocaine, lisinopril, methocarbamol, and metoprolol succinate XL      Allergies:  No Known Allergies    Objective   Objective     Vitals:   Temp:  [98.2 °F (36.8 °C)-98.6 °F (37 °C)] 98.6 °F (37 °C)  Heart Rate:  [100-117] 100  Resp:  [18-20] 18  BP: ()/(52-72) 109/54  Physical Exam               Constitutional:         Awake, alert responsive, conversant, no obvious distress   Eyes:                       PERRLA, sclerae anicteric, no conjunctival injection   HEENT:                   Moist mucous membranes, no nasal or eye discharge, no throat congestion   Neck:                      Supple, no thyromegaly, no lymphadenopathy, trachea midline, no elevated JVD   Respiratory:           Clear to auscultation bilaterally, nonlabored respirations    Cardiovascular:     RRR, no murmurs, rubs, or gallops, palpable pedal pulses bilaterally,No bilateral ankle edema   Gastrointestinal:   Positive bowel sounds, soft, nontender, nondistended, no organomegaly   Musculoskeletal:   No clubbing or cyanosis to extremities, muscle  wasting, joint swelling, muscle weakness   Psychiatric:             Appropriate affect, cooperative   Neurologic:            Awake alert ,oriented x 3, strength symmetric in all extremities, Cranial Nerves grossly intact to confrontation, speech clear   Skin:                      No rashes, bruising, skin ulcers, petechiae or ecchymosis    Result Review    Result Review:  I have personally reviewed the results from the time of this admission to 1/10/2023 11:07 EST and agree with these findings:  [x]  Laboratory  [x]  Microbiology  [x]  Radiology  [x]  EKG/Telemetry   [x]  Cardiology/Vascular   [x]  Pathology  [x]  Old records  []  Other:    Assessment & Plan   Assessment / Plan     Active Hospital Problems:  Active Hospital Problems    Diagnosis    • **Urinary tract infection without hematuria, site unspecified    • HTN (hypertension)    • Malnourished (HCC)    • BMI less than 19,adult    • KATE (acute kidney injury) (HCC)       77 y.o. female With past medical history of hypertension, hyperlipidemia, depression who was in her usual state of health when she started experiencing lower back pain for the last 3 weeks.  She also had associated dysuria nausea and vomiting found to be septic with likely source being UTI associated with elevated lactate and white count and creatinine rise from an unknown baseline to 1.84 likely denoting an KATE.  She improved with IV fluids and has been initiated on antibiotics    Plan:   We will switch over to bicarb drip at 75 cc/h for the next 24 hours  Urine and blood cultures are pending  We will continue ceftriaxone 1 g daily  Will hold antihypertensives at this      DVT prophylaxis:  Medical DVT prophylaxis orders are present.    CODE STATUS:    Code Status (Patient has no pulse and is not breathing): CPR (Attempt to Resuscitate)  Medical Interventions (Patient has pulse or is breathing): Full Support    Admission Status:  I believe this patient meets inpatient  status.    Electronically signed by Louis Fernandez MD, 01/10/23, 9:09 AM EST.

## 2023-01-10 NOTE — CONSULTS
"Nutrition Services    Patient Name: Sandra Sylvester  YOB: 1945  MRN: [id number]  Admission date: 1/9/2023      CLINICAL NUTRITION ASSESSMENT      Reason for Assessment  Identified at risk by screening criteria, BMI   H&P:    Past Medical History:   Diagnosis Date   • COPD (chronic obstructive pulmonary disease) (HCC)    • Hyperlipidemia    • Hypertension         Current Problems:   Active Hospital Problems    Diagnosis    • **Urinary tract infection without hematuria, site unspecified    • HTN (hypertension)    • Malnourished (HCC)    • BMI less than 19,adult    • KATE (acute kidney injury) ([facility])         Nutrition/Diet History         Narrative     RD triggered for pt assessment due to low BMI. Per wt hx, pt has a wt loss of 5.5% x 6 months. Pt reports a decrease in appetite x 1 month and unsure of her UBW. Pt consumed 25% of breakfast this morning per RN documentation. RD performed NFPE, finding severe fat and muscle wasting. Pt reports drinking Ensure at home and prefers vanilla. Pt has no food preferences and states that if she's hungry, she will eat anything.       Anthropometrics        Current Height, Weight Height: 149.9 cm (59.02\")  Weight: 38.9 kg (85 lb 12.1 oz)   Current BMI Body mass index is 17.31 kg/m². underweight       Weight Hx  Wt Readings from Last 30 Encounters:   01/10/23 0452 38.9 kg (85 lb 12.1 oz)   01/10/23 0155 38.9 kg (85 lb 12.1 oz)   01/09/23 2025 42 kg (92 lb 9.5 oz)   09/01/22 1426 40.8 kg (90 lb)   08/01/22 1431 40.8 kg (90 lb)   06/07/22 1016 40.8 kg (90 lb)   05/07/22 1924 40.9 kg (90 lb 2.7 oz)            Wt Change Observation -5.5 % x 6 months     Estimated/Assessed Needs       Energy Requirements 30-35 kcal/kg ABW   EST Needs (kcal/day) 7142-1471 kcal       Protein Requirements 1-1.3 g/kg ABW   EST Daily Needs (g/day) 39-51 g       Fluid Requirements 1 ml/kcal    Estimated Needs (mL/day) 4361-9966 ml     Labs/Medications         Pertinent Labs Reviewed.   Results " from last 7 days   Lab Units 01/10/23  0438 01/10/23  0221 01/09/23  2314 01/09/23 2011   SODIUM mmol/L 134* 135*  --  135*   SODIUM, VENOUS mmol/L  --   --  135.2*  --    POTASSIUM mmol/L 4.7 4.6  --  6.3*   POTASSIUM, VENOUS mmol/L  --   --  4.5  --    CHLORIDE mmol/L 104 106  --  98   CHLORIDE, VENOUS mmol/L  --   --  106  --    CO2 mmol/L 15.9* 18.9*  --  22.6   BUN mg/dL 26* 27*  --  33*   CREATININE mg/dL 1.18* 1.19*  --  1.84*   CALCIUM mg/dL 8.9 8.7  --  10.7*   BILIRUBIN mg/dL  --   --   --  0.6   ALK PHOS U/L  --   --   --  148*   ALT (SGPT) U/L  --   --   --  33   AST (SGOT) U/L  --   --   --  56*   GLUCOSE mg/dL 111* 102*  --  145*   GLUCOSE, ARTERIAL mg/dL  --   --  197*  --      Results from last 7 days   Lab Units 01/10/23  0221 01/09/23  2011   MAGNESIUM mg/dL 2.0  --    PHOSPHORUS mg/dL 2.8  --    HEMOGLOBIN g/dL  --  15.5   HEMATOCRIT %  --  46.7*     No results found for: COVID19  No results found for: HGBA1C      Pertinent Medications Reviewed.     Current Nutrition Orders & Evaluation of Intake       Oral Nutrition     Current PO Diet Diet: Regular/House Diet; Texture: Regular Texture (IDDSI 7); Fluid Consistency: Thin (IDDSI 0)   Supplement No active supplement orders       Malnutrition Severity Assessment      Patient meets criteria for : Severe Malnutrition  Malnutrition Type (last 8 hours)     Malnutrition Severity Assessment     Row Name 01/10/23 1233       Malnutrition Severity Assessment    Malnutrition Type Chronic Disease - Related Malnutrition    Row Name 01/10/23 1233       Muscle Loss    Loss of Muscle Mass Findings Severe    Lutheran Region Severe - deep hollowing/scooping, lack of muscle to touch, facial bones well defined    Clavicle Bone Region Severe - protruding prominent bone    Acromion Bone Region Severe - squared shoulders, bones, and acromion process protrusion prominent    Scapular Bone Region Severe - prominent bones, depressions easily visible between ribs, scapula,  spine, shoulders    Patellar Region Severe - prominent bone, square looking, very little muscle definition    Anterior Thigh Region Severe - line/depression along thigh, obviously thin    Posterior Calf Region Severe - thin with very little definition/firmness    Row Name 01/10/23 1233       Fat Loss    Subcutaneous Fat Loss Findings Severe    Orbital Region  Severe - pronounced hollowness/depression, dark circles, loose saggy skin    Upper Arm Region Severe - mostly skin, very little space between folds, fingers touch    Row Name 01/10/23 1233       Criteria Met (Must meet criteria for severity in at least 2 of these categories: M Wasting, Fat Loss, Fluid, Secondary Signs, Wt. Status, Intake)    Patient meets criteria for  Severe Malnutrition                     Nutrition Diagnosis         Nutrition Dx Problem 1 Severe malnutrition related to inadequate energy Intake as evidenced by decreased appetite., unintended wt change., body composition changes. and patient report.       Nutrition Intervention         Order Ensure Enlive TID (1050 kcal & 60 g pro per day).     Medical Nutrition Therapy/Nutrition Education          Learner     Readiness Patient  Acceptance     Method     Response Explanation  Demonstrates understanding     Monitor/Evaluation        Monitor PO intake, Supplement intake, Pertinent labs, Weight     Nutrition Discharge Plan         To be determined     Electronically signed by:  Cheryl Garcia RD  01/10/23 12:10 EST

## 2023-01-10 NOTE — PLAN OF CARE
Goal Outcome Evaluation:  Pt alert and oriented.  Pt had diarrhea x2. Pt has chronic back pain which is intermittent and administered pain medication.  ERIN SANTA RN

## 2023-01-11 PROBLEM — E43 SEVERE MALNUTRITION: Status: ACTIVE | Noted: 2023-01-11

## 2023-01-11 LAB
ANION GAP SERPL CALCULATED.3IONS-SCNC: 4.3 MMOL/L (ref 5–15)
BACTERIA SPEC AEROBE CULT: NORMAL
BASOPHILS # BLD AUTO: 0.02 10*3/MM3 (ref 0–0.2)
BASOPHILS NFR BLD AUTO: 0.3 % (ref 0–1.5)
BUN SERPL-MCNC: 11 MG/DL (ref 8–23)
BUN/CREAT SERPL: 16.2 (ref 7–25)
CALCIUM SPEC-SCNC: 8.2 MG/DL (ref 8.6–10.5)
CHLORIDE SERPL-SCNC: 98 MMOL/L (ref 98–107)
CO2 SERPL-SCNC: 32.7 MMOL/L (ref 22–29)
CREAT SERPL-MCNC: 0.68 MG/DL (ref 0.57–1)
DEPRECATED RDW RBC AUTO: 49.1 FL (ref 37–54)
EGFRCR SERPLBLD CKD-EPI 2021: 88.7 ML/MIN/1.73
EOSINOPHIL # BLD AUTO: 0.19 10*3/MM3 (ref 0–0.4)
EOSINOPHIL NFR BLD AUTO: 2.6 % (ref 0.3–6.2)
ERYTHROCYTE [DISTWIDTH] IN BLOOD BY AUTOMATED COUNT: 13.8 % (ref 12.3–15.4)
GLUCOSE BLDC GLUCOMTR-MCNC: 111 MG/DL (ref 70–99)
GLUCOSE SERPL-MCNC: 102 MG/DL (ref 65–99)
HCT VFR BLD AUTO: 31.9 % (ref 34–46.6)
HGB BLD-MCNC: 11 G/DL (ref 12–15.9)
IMM GRANULOCYTES # BLD AUTO: 0.13 10*3/MM3 (ref 0–0.05)
IMM GRANULOCYTES NFR BLD AUTO: 1.8 % (ref 0–0.5)
LYMPHOCYTES # BLD AUTO: 0.28 10*3/MM3 (ref 0.7–3.1)
LYMPHOCYTES NFR BLD AUTO: 3.9 % (ref 19.6–45.3)
MAGNESIUM SERPL-MCNC: 1.9 MG/DL (ref 1.6–2.4)
MCH RBC QN AUTO: 33 PG (ref 26.6–33)
MCHC RBC AUTO-ENTMCNC: 34.5 G/DL (ref 31.5–35.7)
MCV RBC AUTO: 95.8 FL (ref 79–97)
MONOCYTES # BLD AUTO: 0.8 10*3/MM3 (ref 0.1–0.9)
MONOCYTES NFR BLD AUTO: 11 % (ref 5–12)
NEUTROPHILS NFR BLD AUTO: 5.84 10*3/MM3 (ref 1.7–7)
NEUTROPHILS NFR BLD AUTO: 80.4 % (ref 42.7–76)
NRBC BLD AUTO-RTO: 0 /100 WBC (ref 0–0.2)
PHOSPHATE SERPL-MCNC: 1.4 MG/DL (ref 2.5–4.5)
PLATELET # BLD AUTO: 133 10*3/MM3 (ref 140–450)
PMV BLD AUTO: 9.4 FL (ref 6–12)
POTASSIUM SERPL-SCNC: 3.9 MMOL/L (ref 3.5–5.2)
RBC # BLD AUTO: 3.33 10*6/MM3 (ref 3.77–5.28)
SODIUM SERPL-SCNC: 135 MMOL/L (ref 136–145)
WBC NRBC COR # BLD: 7.26 10*3/MM3 (ref 3.4–10.8)

## 2023-01-11 PROCEDURE — 25010000002 ONDANSETRON PER 1 MG: Performed by: STUDENT IN AN ORGANIZED HEALTH CARE EDUCATION/TRAINING PROGRAM

## 2023-01-11 PROCEDURE — 82962 GLUCOSE BLOOD TEST: CPT

## 2023-01-11 PROCEDURE — 25010000002 ENOXAPARIN PER 10 MG: Performed by: STUDENT IN AN ORGANIZED HEALTH CARE EDUCATION/TRAINING PROGRAM

## 2023-01-11 PROCEDURE — 80048 BASIC METABOLIC PNL TOTAL CA: CPT | Performed by: STUDENT IN AN ORGANIZED HEALTH CARE EDUCATION/TRAINING PROGRAM

## 2023-01-11 PROCEDURE — 85025 COMPLETE CBC W/AUTO DIFF WBC: CPT | Performed by: STUDENT IN AN ORGANIZED HEALTH CARE EDUCATION/TRAINING PROGRAM

## 2023-01-11 PROCEDURE — 83735 ASSAY OF MAGNESIUM: CPT | Performed by: STUDENT IN AN ORGANIZED HEALTH CARE EDUCATION/TRAINING PROGRAM

## 2023-01-11 PROCEDURE — 25010000002 CEFTRIAXONE PER 250 MG: Performed by: STUDENT IN AN ORGANIZED HEALTH CARE EDUCATION/TRAINING PROGRAM

## 2023-01-11 PROCEDURE — 84100 ASSAY OF PHOSPHORUS: CPT | Performed by: STUDENT IN AN ORGANIZED HEALTH CARE EDUCATION/TRAINING PROGRAM

## 2023-01-11 RX ADMIN — HYDROCODONE BITARTRATE AND ACETAMINOPHEN 1 TABLET: 5; 325 TABLET ORAL at 21:14

## 2023-01-11 RX ADMIN — ONDANSETRON 4 MG: 2 INJECTION INTRAMUSCULAR; INTRAVENOUS at 17:16

## 2023-01-11 RX ADMIN — FAMOTIDINE 40 MG: 20 TABLET, FILM COATED ORAL at 08:30

## 2023-01-11 RX ADMIN — POTASSIUM & SODIUM PHOSPHATES POWDER PACK 280-160-250 MG 1 PACKET: 280-160-250 PACK at 17:16

## 2023-01-11 RX ADMIN — CEFTRIAXONE SODIUM 1 G: 1 INJECTION, SOLUTION INTRAVENOUS at 21:15

## 2023-01-11 RX ADMIN — SENNOSIDES AND DOCUSATE SODIUM 2 TABLET: 8.6; 5 TABLET ORAL at 21:14

## 2023-01-11 RX ADMIN — HYDROCODONE BITARTRATE AND ACETAMINOPHEN 1 TABLET: 5; 325 TABLET ORAL at 11:38

## 2023-01-11 RX ADMIN — POTASSIUM & SODIUM PHOSPHATES POWDER PACK 280-160-250 MG 1 PACKET: 280-160-250 PACK at 08:31

## 2023-01-11 RX ADMIN — POTASSIUM & SODIUM PHOSPHATES POWDER PACK 280-160-250 MG 1 PACKET: 280-160-250 PACK at 21:14

## 2023-01-11 RX ADMIN — POTASSIUM & SODIUM PHOSPHATES POWDER PACK 280-160-250 MG 1 PACKET: 280-160-250 PACK at 11:38

## 2023-01-11 RX ADMIN — ATORVASTATIN CALCIUM 40 MG: 40 TABLET, FILM COATED ORAL at 21:14

## 2023-01-11 RX ADMIN — Medication 10 ML: at 21:15

## 2023-01-11 RX ADMIN — ENOXAPARIN SODIUM 30 MG: 100 INJECTION SUBCUTANEOUS at 08:31

## 2023-01-11 RX ADMIN — ONDANSETRON 4 MG: 2 INJECTION INTRAMUSCULAR; INTRAVENOUS at 11:38

## 2023-01-11 NOTE — PLAN OF CARE
Goal Outcome Evaluation:  Pt alert and oriented. Pt. Rested well with no complaints of pain.  Pt. Received antibiotic.  ERIN SANTA RN

## 2023-01-11 NOTE — PLAN OF CARE
Goal Outcome Evaluation:   Pt. Rested intermittently throughout shift.  Family in to visit.  VSS.        Progress: improving

## 2023-01-11 NOTE — PROGRESS NOTES
[facility]     Progress Note    Patient Name: Sandra Sylvester  : 1945  MRN: [id number]  Primary Care Physician:  Ren Garrett DPM  Date of admission: 2023    Subjective    No acute events ovenright  PO intake is poor  Cultures negative till now  Bicarb improved  Cr normal now    Facility-Administered Medications as of 2023   Medication Dose Route Frequency Provider Last Rate Last Admin   • acetaminophen (TYLENOL) tablet 650 mg  650 mg Oral Q4H PRN Louis Fernandez MD        Or   • acetaminophen (TYLENOL) 160 MG/5ML solution 650 mg  650 mg Oral Q4H PRN Louis Fernandez MD        Or   • acetaminophen (TYLENOL) suppository 650 mg  650 mg Rectal Q4H PRN Louis Fernandez MD       • ALPRAZolam (XANAX) tablet 0.5 mg  0.5 mg Oral Q8H PRN Louis Fernandez MD       • aluminum-magnesium hydroxide-simethicone (MAALOX MAX) 400-400-40 MG/5ML suspension 15 mL  15 mL Oral Q6H PRN Louis Fernandez MD       • atorvastatin (LIPITOR) tablet 40 mg  40 mg Oral Nightly Louis Fernandez MD   40 mg at 01/10/23 2108   • sennosides-docusate (PERICOLACE) 8.6-50 MG per tablet 2 tablet  2 tablet Oral BID Louis Fernandez MD        And   • polyethylene glycol (MIRALAX) packet 17 g  17 g Oral Daily PRN Louis Fernandez MD        And   • bisacodyl (DULCOLAX) EC tablet 5 mg  5 mg Oral Daily PRN Louis Fernandez MD        And   • bisacodyl (DULCOLAX) suppository 10 mg  10 mg Rectal Daily PRN Louis Fernandez MD       • [COMPLETED] calcium gluconate 1g/50ml 0.675% NaCl IV SOLN  1 g Intravenous Once Servando Max MD   Stopped at 23 2227   • [COMPLETED] cefTRIAXone (ROCEPHIN) IVPB 1 g  1 g Intravenous Once Norris MaxElmira, MD   Stopped at 23   • cefTRIAXone (ROCEPHIN) IVPB 1 g  1 g Intravenous Q24H Louis eFrnandez  mL/hr at 01/10/23 2108 1 g at 01/10/23 2108   • [COMPLETED] dextrose (D50W) (25 g/50 mL) IV injection 25 g  25 g Intravenous Once Dorfling,  MD Servando   25 g at 01/09/23 2233   • Enoxaparin Sodium (LOVENOX) syringe 30 mg  30 mg Subcutaneous Q24H Louis Fernandez MD   30 mg at 01/10/23 1000   • famotidine (PEPCID) tablet 40 mg  40 mg Oral Daily Louis Fernandez MD   40 mg at 01/10/23 1000   • HYDROcodone-acetaminophen (NORCO)  MG per tablet 1 tablet  1 tablet Oral Q4H PRN Louis Fernandez MD       • HYDROcodone-acetaminophen (NORCO) 5-325 MG per tablet 1 tablet  1 tablet Oral Q4H PRN Louis Fernandez MD   1 tablet at 01/10/23 0431   • HYDROmorphone (DILAUDID) injection 0.5 mg  0.5 mg Intravenous Q2H PRN Louis Fernandez MD        And   • naloxone (NARCAN) injection 0.4 mg  0.4 mg Intravenous Q5 Min PRN Louis Fernandez MD       • [COMPLETED] insulin regular (humuLIN R,novoLIN R) injection 8.4 Units  0.2 Units/kg Intravenous Once Servando Max MD   8.4 Units at 01/09/23 2231   • melatonin tablet 5 mg  5 mg Oral Nightly PRN Louis Fernandez MD       • ondansetron (ZOFRAN) injection 4 mg  4 mg Intravenous Q6H PRN Louis Fernandez MD       • potassium & sodium phosphates (PHOS-NAK) oral packet  1 packet Oral 4x Daily With Meals & Nightly Louis Fernandez MD       • sodium bicarbonate 150 mEq/1000 mL dextrose infusion  150 mEq Intravenous Continuous Louis Fernandez MD 75 mL/hr at 01/10/23 2147 150 mEq at 01/10/23 2147   • [COMPLETED] sodium bicarbonate injection 8.4% 50 mEq  50 mEq Intravenous Once Servando Max MD   50 mEq at 01/09/23 2228   • [COMPLETED] sodium chloride 0.9 % bolus 1,000 mL  1,000 mL Intravenous Once Norm Maher, DO   Stopped at 01/09/23 2202   • [COMPLETED] sodium chloride 0.9 % bolus 1,000 mL  1,000 mL Intravenous Once Servando Max MD   Stopped at 01/09/23 7285   • sodium chloride 0.9 % flush 10 mL  10 mL Intravenous PRN Louis Fernandez MD       • sodium chloride 0.9 % flush 10 mL  10 mL Intravenous PRN Louis Fernandez MD       • sodium chloride 0.9 % flush  10 mL  10 mL Intravenous Q12H Louis Fernandez MD   10 mL at 01/10/23 2114   • sodium chloride 0.9 % infusion 40 mL  40 mL Intravenous PRN Louis Fernandez MD       • [COMPLETED] sodium zirconium cyclosilicate (LOKELMA) pack 10 g  10 g Oral Once Louis Fernandez MD   10 g at 01/10/23 0431          Review of Systems  Constitutional:        Weakness tiredness fatigue  Eyes:                       No blurry vision, eye discharge, eye irritation, eye pain  HEENT:                   No acute hair loss, earache and discharge, nasal congestion or discharge, sore throat, postnasal drip  Respiratory:           No shortness of breath coughing sputum production wheezing hemoptysis pleuritic chest pain  Cardiovascular:     No chest pain, orthopnea, PND, dizziness, palpitation, lower extremity edema  Gastrointestinal:   No nausea vomiting diarrhea abdominal pain constipation  Genitourinary:       No urinary incontinence, hesitancy, frequency, urgency, dysuria  Hematologic:         No bruising, bleeding, pallor, lymphadenopathy  Endocrine:            No coldness, hot flashes, polyuria, abnormal hair growth  Musculoskeletal:    No body pains, aches, arthritic pains, muscle pain ,muscle wasting  Psychiatric:          No low or high mood, anxiety, hallucinations, delusions  Skin.                      No rash, ulcers, bruising, itching  Neurological:        No confusion, headache, focal weakness, numbness, dysphasia    Objective   Objective     Vitals:   Temp:  [98.2 °F (36.8 °C)-99.1 °F (37.3 °C)] 98.2 °F (36.8 °C)  Heart Rate:  [] 91  Resp:  [16-18] 16  BP: (109-151)/(54-82) 142/75  Physical Exam    Constitutional: Awake, alert responsive, conversant, no obvious distress              Psychiatric:  Appropriate affect, cooperative   Neurologic:  Awake alert ,oriented x 3, strength symmetric in all extremities, Cranial Nerves grossly intact to confrontation, speech clear   Eyes:   PERRLA, sclerae anicteric, no  conjunctival injection   HEENT:  Moist mucous membranes, no nasal or eye discharge, no throat congestion   Neck:   Supple, no thyromegaly, no lymphadenopathy, trachea midline, no elevated JVD   Respiratory:  Clear to auscultation bilaterally, nonlabored respirations    Cardiovascular: RRR, no murmurs, rubs, or gallops, palpable pedal pulses bilaterally, No bilateral ankle edema   Gastrointestinal: Positive bowel sounds, soft, nontender, nondistended, no organomegaly   Musculoskeletal:  No clubbing or cyanosis to extremities,muscle wasting, joint swelling, muscle weakness             Skin:                      No rashes, bruising, skin ulcers, petechiae or ecchymosis    Result Review    Result Review:  I have personally reviewed the results from the time of this admission to 1/11/2023 07:33 EST and agree with these findings:  []  Laboratory  []  Microbiology  []  Radiology  []  EKG/Telemetry   []  Cardiology/Vascular   []  Pathology  []  Old records  []  Other:    Assessment & Plan   Assessment / Plan       Active Hospital Problems:  Active Hospital Problems    Diagnosis    • **Urinary tract infection without hematuria, site unspecified    • Severe malnutrition (HCC)    • HTN (hypertension)    • Malnourished (HCC)    • BMI less than 19,adult    • KATE (acute kidney injury) (HCC)      77 y.o. female With past medical history of hypertension, hyperlipidemia, depression who was in her usual state of health when she started experiencing lower back pain for the last 3 weeks.  She also had associated dysuria nausea and vomiting found to be septic with likely source being UTI associated with elevated lactate and white count and creatinine rise from an unknown baseline to 1.84 likely denoting an KATE.  She improved with IV fluids and has been initiated on antibiotics     Plan:   DC IV fluids  Urine and blood cultures are pending  We will continue ceftriaxone 1 g daily  Will hold antihypertensives at  this  PT/OT       Electronically signed by Louis Fernandez MD, 01/11/23, 7:33 AM EST.

## 2023-01-12 LAB
BASOPHILS # BLD AUTO: 0.02 10*3/MM3 (ref 0–0.2)
BASOPHILS NFR BLD AUTO: 0.4 % (ref 0–1.5)
DEPRECATED RDW RBC AUTO: 49.1 FL (ref 37–54)
EOSINOPHIL # BLD AUTO: 0.21 10*3/MM3 (ref 0–0.4)
EOSINOPHIL NFR BLD AUTO: 3.7 % (ref 0.3–6.2)
ERYTHROCYTE [DISTWIDTH] IN BLOOD BY AUTOMATED COUNT: 13.9 % (ref 12.3–15.4)
HCT VFR BLD AUTO: 33.7 % (ref 34–46.6)
HGB BLD-MCNC: 11.4 G/DL (ref 12–15.9)
IMM GRANULOCYTES # BLD AUTO: 0.01 10*3/MM3 (ref 0–0.05)
IMM GRANULOCYTES NFR BLD AUTO: 0.2 % (ref 0–0.5)
LYMPHOCYTES # BLD AUTO: 0.2 10*3/MM3 (ref 0.7–3.1)
LYMPHOCYTES NFR BLD AUTO: 3.5 % (ref 19.6–45.3)
MAGNESIUM SERPL-MCNC: 1.9 MG/DL (ref 1.6–2.4)
MCH RBC QN AUTO: 32.8 PG (ref 26.6–33)
MCHC RBC AUTO-ENTMCNC: 33.8 G/DL (ref 31.5–35.7)
MCV RBC AUTO: 96.8 FL (ref 79–97)
MONOCYTES # BLD AUTO: 0.67 10*3/MM3 (ref 0.1–0.9)
MONOCYTES NFR BLD AUTO: 11.7 % (ref 5–12)
NEUTROPHILS NFR BLD AUTO: 4.6 10*3/MM3 (ref 1.7–7)
NEUTROPHILS NFR BLD AUTO: 80.5 % (ref 42.7–76)
NRBC BLD AUTO-RTO: 0 /100 WBC (ref 0–0.2)
PHOSPHATE SERPL-MCNC: 2.4 MG/DL (ref 2.5–4.5)
PLATELET # BLD AUTO: 141 10*3/MM3 (ref 140–450)
PMV BLD AUTO: 9.8 FL (ref 6–12)
RBC # BLD AUTO: 3.48 10*6/MM3 (ref 3.77–5.28)
WBC NRBC COR # BLD: 5.71 10*3/MM3 (ref 3.4–10.8)

## 2023-01-12 PROCEDURE — 97165 OT EVAL LOW COMPLEX 30 MIN: CPT

## 2023-01-12 PROCEDURE — 85025 COMPLETE CBC W/AUTO DIFF WBC: CPT | Performed by: STUDENT IN AN ORGANIZED HEALTH CARE EDUCATION/TRAINING PROGRAM

## 2023-01-12 PROCEDURE — 97161 PT EVAL LOW COMPLEX 20 MIN: CPT

## 2023-01-12 PROCEDURE — 25010000002 CEFTRIAXONE PER 250 MG: Performed by: STUDENT IN AN ORGANIZED HEALTH CARE EDUCATION/TRAINING PROGRAM

## 2023-01-12 PROCEDURE — 84100 ASSAY OF PHOSPHORUS: CPT | Performed by: STUDENT IN AN ORGANIZED HEALTH CARE EDUCATION/TRAINING PROGRAM

## 2023-01-12 PROCEDURE — 25010000002 ONDANSETRON PER 1 MG: Performed by: STUDENT IN AN ORGANIZED HEALTH CARE EDUCATION/TRAINING PROGRAM

## 2023-01-12 PROCEDURE — 83735 ASSAY OF MAGNESIUM: CPT | Performed by: STUDENT IN AN ORGANIZED HEALTH CARE EDUCATION/TRAINING PROGRAM

## 2023-01-12 PROCEDURE — 25010000002 ENOXAPARIN PER 10 MG: Performed by: STUDENT IN AN ORGANIZED HEALTH CARE EDUCATION/TRAINING PROGRAM

## 2023-01-12 RX ORDER — METOPROLOL SUCCINATE 25 MG/1
25 TABLET, EXTENDED RELEASE ORAL DAILY
Status: DISCONTINUED | OUTPATIENT
Start: 2023-01-12 | End: 2023-01-13 | Stop reason: HOSPADM

## 2023-01-12 RX ORDER — BUSPIRONE HYDROCHLORIDE 10 MG/1
10 TABLET ORAL 3 TIMES DAILY
Status: DISCONTINUED | OUTPATIENT
Start: 2023-01-12 | End: 2023-01-13 | Stop reason: HOSPADM

## 2023-01-12 RX ADMIN — ATORVASTATIN CALCIUM 40 MG: 40 TABLET, FILM COATED ORAL at 20:16

## 2023-01-12 RX ADMIN — BUSPIRONE HYDROCHLORIDE 10 MG: 10 TABLET ORAL at 20:16

## 2023-01-12 RX ADMIN — POTASSIUM & SODIUM PHOSPHATES POWDER PACK 280-160-250 MG 1 PACKET: 280-160-250 PACK at 20:16

## 2023-01-12 RX ADMIN — POTASSIUM & SODIUM PHOSPHATES POWDER PACK 280-160-250 MG 1 PACKET: 280-160-250 PACK at 08:22

## 2023-01-12 RX ADMIN — Medication 10 ML: at 20:17

## 2023-01-12 RX ADMIN — METOPROLOL SUCCINATE 25 MG: 25 TABLET, EXTENDED RELEASE ORAL at 09:22

## 2023-01-12 RX ADMIN — POTASSIUM & SODIUM PHOSPHATES POWDER PACK 280-160-250 MG 1 PACKET: 280-160-250 PACK at 17:33

## 2023-01-12 RX ADMIN — BUSPIRONE HYDROCHLORIDE 10 MG: 10 TABLET ORAL at 16:04

## 2023-01-12 RX ADMIN — POTASSIUM & SODIUM PHOSPHATES POWDER PACK 280-160-250 MG 1 PACKET: 280-160-250 PACK at 12:17

## 2023-01-12 RX ADMIN — BUSPIRONE HYDROCHLORIDE 10 MG: 10 TABLET ORAL at 09:22

## 2023-01-12 RX ADMIN — ONDANSETRON 4 MG: 2 INJECTION INTRAMUSCULAR; INTRAVENOUS at 17:37

## 2023-01-12 RX ADMIN — FAMOTIDINE 40 MG: 20 TABLET, FILM COATED ORAL at 08:22

## 2023-01-12 RX ADMIN — Medication 10 ML: at 08:21

## 2023-01-12 RX ADMIN — ONDANSETRON 4 MG: 2 INJECTION INTRAMUSCULAR; INTRAVENOUS at 12:20

## 2023-01-12 RX ADMIN — ENOXAPARIN SODIUM 30 MG: 100 INJECTION SUBCUTANEOUS at 08:22

## 2023-01-12 RX ADMIN — CEFTRIAXONE SODIUM 1 G: 1 INJECTION, SOLUTION INTRAVENOUS at 20:16

## 2023-01-12 NOTE — PLAN OF CARE
Goal Outcome Evaluation:  Plan of Care Reviewed With: patient   Pt. Rested intermittently throughout shift.  Son in to visit.  Nausea controlled with IV zofran.  VSS.     Progress: improving

## 2023-01-12 NOTE — PLAN OF CARE
Goal Outcome Evaluation:  Plan of Care Reviewed With: patient        Progress: no change (initial evaluation encounter)  Outcome Evaluation: Patient presents at or near baseline functional status with no functional deficits related to functional balance, safety awareness, transfers or mobility that impede patient independence with activities of daily living.  No indicated need for skilled occupational therapy intervention in the acute care setting.  Occupational therapy will sign off at this time. Recommend return home at time of discharge.

## 2023-01-12 NOTE — PROGRESS NOTES
[facility]     Progress Note    Patient Name: Sandra Sylvester  : 1945  MRN: [id number]  Primary Care Physician:  Ren Garrett DPM  Date of admission: 2023    Subjective    Patient overall clinically stable  Continues to feel very weak  No other major acute events overnight  P.o. intake continues to be poor      Facility-Administered Medications as of 2023   Medication Dose Route Frequency Provider Last Rate Last Admin   • acetaminophen (TYLENOL) tablet 650 mg  650 mg Oral Q4H PRN Louis Fernandez MD        Or   • acetaminophen (TYLENOL) 160 MG/5ML solution 650 mg  650 mg Oral Q4H PRN Louis Fernandez MD        Or   • acetaminophen (TYLENOL) suppository 650 mg  650 mg Rectal Q4H PRN Louis Fernandez MD       • ALPRAZolam (XANAX) tablet 0.5 mg  0.5 mg Oral Q8H PRN Louis Fernandez MD       • aluminum-magnesium hydroxide-simethicone (MAALOX MAX) 400-400-40 MG/5ML suspension 15 mL  15 mL Oral Q6H PRN Louis Fernandez MD       • atorvastatin (LIPITOR) tablet 40 mg  40 mg Oral Nightly Louis Fernandez MD   40 mg at 23   • sennosides-docusate (PERICOLACE) 8.6-50 MG per tablet 2 tablet  2 tablet Oral BID Louis Fernandez MD   2 tablet at 23    And   • polyethylene glycol (MIRALAX) packet 17 g  17 g Oral Daily PRN Louis Fernandez MD        And   • bisacodyl (DULCOLAX) EC tablet 5 mg  5 mg Oral Daily PRN Louis Fernandez MD        And   • bisacodyl (DULCOLAX) suppository 10 mg  10 mg Rectal Daily PRN Louis Fernandez MD       • [COMPLETED] calcium gluconate 1g/50ml 0.675% NaCl IV SOLN  1 g Intravenous Once Servando Max MD   Stopped at 23   • [COMPLETED] cefTRIAXone (ROCEPHIN) IVPB 1 g  1 g Intravenous Once Servando Max MD   Stopped at 237   • cefTRIAXone (ROCEPHIN) IVPB 1 g  1 g Intravenous Q24H Louis Fernandez  mL/hr at 23 1 g at 23   • [COMPLETED] dextrose (D50W) (25  g/50 mL) IV injection 25 g  25 g Intravenous Once Servando Max MD   25 g at 01/09/23 2233   • Enoxaparin Sodium (LOVENOX) syringe 30 mg  30 mg Subcutaneous Q24H Louis Fernandez MD   30 mg at 01/12/23 0822   • famotidine (PEPCID) tablet 40 mg  40 mg Oral Daily Louis Fernandez MD   40 mg at 01/12/23 0822   • HYDROcodone-acetaminophen (NORCO)  MG per tablet 1 tablet  1 tablet Oral Q4H PRN Louis Fernandez MD       • HYDROcodone-acetaminophen (NORCO) 5-325 MG per tablet 1 tablet  1 tablet Oral Q4H PRN Louis Fernandez MD   1 tablet at 01/11/23 2114   • HYDROmorphone (DILAUDID) injection 0.5 mg  0.5 mg Intravenous Q2H PRN Louis Fernandez MD        And   • naloxone (NARCAN) injection 0.4 mg  0.4 mg Intravenous Q5 Min PRN Louis Fernandez MD       • [COMPLETED] insulin regular (humuLIN R,novoLIN R) injection 8.4 Units  0.2 Units/kg Intravenous Once Servando Max MD   8.4 Units at 01/09/23 2231   • melatonin tablet 5 mg  5 mg Oral Nightly PRN Louis Fernandez MD       • ondansetron (ZOFRAN) injection 4 mg  4 mg Intravenous Q6H PRN Louis Fernandez MD   4 mg at 01/11/23 1716   • potassium & sodium phosphates (PHOS-NAK) oral packet  1 packet Oral 4x Daily With Meals & Nightly Louis Fernandez MD   1 packet at 01/12/23 0822   • [COMPLETED] sodium bicarbonate injection 8.4% 50 mEq  50 mEq Intravenous Once Servando Max MD   50 mEq at 01/09/23 2228   • [COMPLETED] sodium chloride 0.9 % bolus 1,000 mL  1,000 mL Intravenous Once Lepora, Norm, DO   Stopped at 01/09/23 2202   • [COMPLETED] sodium chloride 0.9 % bolus 1,000 mL  1,000 mL Intravenous Once Servando Max MD   Stopped at 01/09/23 2410   • sodium chloride 0.9 % flush 10 mL  10 mL Intravenous PRN Louis Fernandez MD       • sodium chloride 0.9 % flush 10 mL  10 mL Intravenous PRN Louis Fernandez MD       • sodium chloride 0.9 % flush 10 mL  10 mL Intravenous Q12H Louis Fernandez MD   10  mL at 01/12/23 0821   • sodium chloride 0.9 % infusion 40 mL  40 mL Intravenous PRN Louis Fernandez MD       • [COMPLETED] sodium zirconium cyclosilicate (LOKELMA) pack 10 g  10 g Oral Once Louis Fernandez MD   10 g at 01/10/23 0431          Review of Systems  Constitutional:        Weakness tiredness fatigue  Eyes:                       No blurry vision, eye discharge, eye irritation, eye pain  HEENT:                   No acute hair loss, earache and discharge, nasal congestion or discharge, sore throat, postnasal drip  Respiratory:           No shortness of breath coughing sputum production wheezing hemoptysis pleuritic chest pain  Cardiovascular:     No chest pain, orthopnea, PND, dizziness, palpitation, lower extremity edema  Gastrointestinal:   No nausea vomiting diarrhea abdominal pain constipation  Genitourinary:       No urinary incontinence, hesitancy, frequency, urgency, dysuria  Hematologic:         No bruising, bleeding, pallor, lymphadenopathy  Endocrine:            No coldness, hot flashes, polyuria, abnormal hair growth  Musculoskeletal:    No body pains, aches, arthritic pains, muscle pain ,muscle wasting  Psychiatric:          No low or high mood, anxiety, hallucinations, delusions  Skin.                      No rash, ulcers, bruising, itching  Neurological:        No confusion, headache, focal weakness, numbness, dysphasia    Objective   Objective     Vitals:   Temp:  [98.2 °F (36.8 °C)-98.8 °F (37.1 °C)] 98.6 °F (37 °C)  Heart Rate:  [92-97] 95  Resp:  [16-18] 16  BP: (109-150)/(63-74) 137/71  Physical Exam    Constitutional: Awake, alert responsive, conversant, no obvious distress              Psychiatric:  Appropriate affect, cooperative   Neurologic:  Awake alert ,oriented x 3, strength symmetric in all extremities, Cranial Nerves grossly intact to confrontation, speech clear   Eyes:   PERRLA, sclerae anicteric, no conjunctival injection   HEENT:  Moist mucous membranes, no nasal or  eye discharge, no throat congestion   Neck:   Supple, no thyromegaly, no lymphadenopathy, trachea midline, no elevated JVD   Respiratory:  Clear to auscultation bilaterally, nonlabored respirations    Cardiovascular: RRR, no murmurs, rubs, or gallops, palpable pedal pulses bilaterally, No bilateral ankle edema   Gastrointestinal: Positive bowel sounds, soft, nontender, nondistended, no organomegaly   Musculoskeletal:  No clubbing or cyanosis to extremities,muscle wasting, joint swelling, muscle weakness             Skin:                      No rashes, bruising, skin ulcers, petechiae or ecchymosis    Result Review    Result Review:  I have personally reviewed the results from the time of this admission to 1/12/2023 08:36 EST and agree with these findings:  [x]  Laboratory  [x]  Microbiology  [x]  Radiology  [x]  EKG/Telemetry   [x]  Cardiology/Vascular   [x]  Pathology  []  Old records  []  Other:    Assessment & Plan   Assessment / Plan       Active Hospital Problems:  Active Hospital Problems    Diagnosis    • **Urinary tract infection without hematuria, site unspecified    • Severe malnutrition (HCC)    • HTN (hypertension)    • Malnourished (HCC)    • BMI less than 19,adult    • KATE (acute kidney injury) (HCC)      77 y.o. female With past medical history of hypertension, hyperlipidemia, depression who was in her usual state of health when she started experiencing lower back pain for the last 3 weeks.  She also had associated dysuria nausea and vomiting found to be septic with likely source being UTI associated with elevated lactate and white count and creatinine rise from an unknown baseline to 1.84 likely denoting an KATE.  She improved with IV fluids and has been initiated on antibiotics     Plan:   Cultures have been unremarkable.  Urine culture shows greater than 100,000 waylon  We will continue ceftriaxone 1 g daily  Blood pressures have been stable.  We will restart metoprolol at 25 mg home dose  Will  likely discontinue lisinopril  We will continue BuSpar 10 3 times daily  PT/OT

## 2023-01-12 NOTE — THERAPY EVALUATION
Acute Care - Physical Therapy Initial Evaluation   [facility]     Patient Name: Sandra Sylvester  : 1945  MRN: [id number]  Today's Date: 2023      Visit Dx:     ICD-10-CM ICD-9-CM   1. Urinary tract infection without hematuria, site unspecified  N39.0 599.0   2. Sepsis, due to unspecified organism, unspecified whether acute organ dysfunction present ([facility])  A41.9 038.9     995.91   3. Hyperkalemia  E87.5 276.7   4. Difficulty in walking  R26.2 719.7     Patient Active Problem List   Diagnosis   • Closed fracture of right hip (HCC)   • Closed nondisplaced fracture of acetabulum (HCC)   • Urinary tract infection without hematuria, site unspecified   • HTN (hypertension)   • Malnourished ([facility])   • BMI less than 19,adult   • KATE (acute kidney injury) ([facility])   • Severe malnutrition ([facility])     Past Medical History:   Diagnosis Date   • COPD (chronic obstructive pulmonary disease) ([facility])    • Hyperlipidemia    • Hypertension      History reviewed. No pertinent surgical history.  PT Assessment (last 12 hours)     PT Evaluation and Treatment     Row Name 23 1132          Physical Therapy Time and Intention    Subjective Information complains of;nausea/vomiting  States her stomach isn't feeling well, did not eat breakfast  -MAGNO     Document Type evaluation  -MAGNO     Mode of Treatment individual therapy  -MAGNO     Patient Effort good  -MAGNO     Row Name 23 1132          General Information    Patient Profile Reviewed yes  -MAGNO     Patient Observations alert;cooperative;agree to therapy  -MAGNO     Prior Level of Function independent:  -MAGNO     Equipment Currently Used at Home walker, rolling  -MAGNO     Existing Precautions/Restrictions no known precautions/restrictions  -MAGNO     Barriers to Rehab none identified  -MAGNO     Row Name 23 1132          Living Environment    Current Living Arrangements home  -MAGNO     Home Accessibility stairs to enter home  -MAGNO     People in Home alone  daughter lives near, helps when  needed  -MAGNO     Primary Care Provided by self;child(levi)  -MAGNO     Row Name 01/12/23 1132          Home Main Entrance    Number of Stairs, Main Entrance three  -MAGNO     Stair Railings, Main Entrance railings on both sides of stairs  -MAGNO     Row Name 01/12/23 1132          Home Use of Assistive/Adaptive Equipment    Equipment Currently Used at Home walker, rolling  -MAGNO     Row Name 01/12/23 1132          Range of Motion (ROM)    Range of Motion ROM is WFL  -MAGNO     Row Name 01/12/23 1132          Strength (Manual Muscle Testing)    Strength (Manual Muscle Testing) bilateral lower extremities  4/5 in all planes  -MAGNO     Row Name 01/12/23 1132          Bed Mobility    Bed Mobility bed mobility (all) activities  -MAGNO     All Activities, [place] (Bed Mobility) standby assist  -MAGNO     Row Name 01/12/23 1132          Transfers    Transfers sit-stand transfer;stand-sit transfer  -MAGNO     Row Name 01/12/23 1132          Sit-Stand Transfer    Sit-Stand [place] (Transfers) standby assist  -MAGNO     Assistive Device (Sit-Stand Transfers) walker, front-wheeled  -MAGNO     Row Name 01/12/23 1132          Stand-Sit Transfer    Stand-Sit [place] (Transfers) standby assist  -MAGNO     Assistive Device (Stand-Sit Transfers) walker, front-wheeled  -MAGNO     Row Name 01/12/23 1132          Gait/Stairs (Locomotion)    Gait/Stairs Locomotion gait/ambulation assistive device  -MAGNO     [place] Level (Gait) standby assist  -MAGNO     Assistive Device (Gait) walker, front-wheeled  -MAGNO     Distance in Feet (Gait) 150  -MAGNO     Pattern (Gait) step-through  -MAGNO     Row Name 01/12/23 1132          Safety Issues, Functional Mobility    Impairments Affecting Function (Mobility) --  -MAGNO     Row Name 01/12/23 1132          Balance    Balance Assessment standing dynamic balance  -MAGNO     Dynamic Standing Balance standby assist  -MAGNO     Position/Device Used, Standing Balance walker, front-wheeled  -MAGNO     Row Name 01/12/23 1132          Plan of Care  Review    Plan of Care Reviewed With patient  -MAGNO     Outcome Evaluation Pt. demonstrates safety with bed mobility and transfers, is able to perform with safe mechanics. Pt. is able to ambulate with RW and safe mechanics, no gait deviations or loss of balance. Recommend discharge home at this time due to safety with functional mobility.  -MAGNO     Row Name 01/12/23 1132          Positioning and Restraints    Pre-Treatment Position in bed  -MAGNO     Post Treatment Position bed  -MAGNO     In Bed supine  -MAGNO           User Key  (r) = Recorded By, (t) = Taken By, (c) = Cosigned By    Initials Name Provider Type    Lamont Pearson, PT Physical Therapist                Physical Therapy Education     Title: PT OT SLP Therapies (Done)     Topic: Physical Therapy (Done)     Point: Mobility training (Done)     Learning Progress Summary           Patient Acceptance, E, VU by MAGNO at 1/12/2023 1153                               User Key     Initials Effective Dates Name Provider Type Discipline    MAGNO 06/03/21 -  Lamont Cr, PT Physical Therapist PT              PT Recommendation and Plan  Anticipated Discharge Disposition (PT): home  Plan of Care Reviewed With: patient  Outcome Evaluation: Pt. demonstrates safety with bed mobility and transfers, is able to perform with safe mechanics. Pt. is able to ambulate with RW and safe mechanics, no gait deviations or loss of balance. Recommend discharge home at this time due to safety with functional mobility.   Outcome Measures     Row Name 01/12/23 1100             How much help from another person do you currently need...    Turning from your back to your side while in flat bed without using bedrails? 4  -MAGNO      Moving from lying on back to sitting on the side of a flat bed without bedrails? 4  -MAGNO      Moving to and from a bed to a chair (including a wheelchair)? 4  -MAGNO      Standing up from a chair using your arms (e.g., wheelchair, bedside chair)? 4  -MAGNO      Climbing 3-5 steps  with a railing? 4  -MAGNO      To walk in hospital room? 4  -MAGNO      AM-PAC 6 Clicks Score (PT) 24  -MAGNO            User Key  (r) = Recorded By, (t) = Taken By, (c) = Cosigned By    Initials Name Provider Type    Lamont Pearson, PT Physical Therapist                 Time Calculation:    PT Charges     Row Name 01/12/23 1132             Time Calculation    PT Received On 01/12/23  -MAGNO      PT Goal Re-Cert Due Date 01/21/23  -MAGNO         Untimed Charges    PT Eval/Re-eval Minutes 30  -MAGNO         Total Minutes    Untimed Charges Total Minutes 30  -MAGNO       Total Minutes 30  -MAGNO            User Key  (r) = Recorded By, (t) = Taken By, (c) = Cosigned By    Initials Name Provider Type    Lamont Pearson, PT Physical Therapist              Therapy Charges for Today     Code Description Service Date Service Provider Modifiers Qty    74374576154 HC PT EVAL LOW COMPLEXITY 2 1/12/2023 Lamont Cr, PT GP 1          PT G-Codes  AM-PAC 6 Clicks Score (PT): 24    Lamont Cr, PT  1/12/2023

## 2023-01-12 NOTE — THERAPY EVALUATION
Patient Name: Sandra Sylvester  : 1945    MRN: [id number]                              Today's Date: 2023       Admit Date: 2023    Visit Dx:     ICD-10-CM ICD-9-CM   1. Urinary tract infection without hematuria, site unspecified  N39.0 599.0   2. Sepsis, due to unspecified organism, unspecified whether acute organ dysfunction present (HCC)  A41.9 038.9     995.91   3. Hyperkalemia  E87.5 276.7   4. Difficulty in walking  R26.2 719.7   5. Decreased activities of daily living (ADL)  Z78.9 V49.89     Patient Active Problem List   Diagnosis   • Closed fracture of right hip (HCC)   • Closed nondisplaced fracture of acetabulum (HCC)   • Urinary tract infection without hematuria, site unspecified   • HTN (hypertension)   • Malnourished (HCC)   • BMI less than 19,adult   • KATE (acute kidney injury) (HCC)   • Severe malnutrition (HCC)     Past Medical History:   Diagnosis Date   • COPD (chronic obstructive pulmonary disease) ([facility])    • Hyperlipidemia    • Hypertension      History reviewed. No pertinent surgical history.   General Information     Row Name 23 1446          OT Time and Intention    Document Type evaluation  -ES     Mode of Treatment individual therapy;occupational therapy  -ES     Row Name 23 1446          General Information    Patient Profile Reviewed yes  -ES     Prior Level of Function independent:;ADL's;all household mobility;community mobility  Patient independent with ADLs at baseline. Reports family lives next door if assist is needed. SPC for functional mobility, has RWX if needed. Tub/shower w/ shower chair. Groom in stance. Standard commodes.  No home O2 use.  -ES     Existing Precautions/Restrictions no known precautions/restrictions  -ES     Barriers to Rehab none identified  -ES     Row Name 23 1446          Occupational Profile    Reason for Services/Referral (Occupational Profile) Patient is 77 yr old female admitted to [facility] on 2023 with  reports of lower back pain x3 weeks, weakness, and fatigue. OT evaluation and treatment ordered d/t recent decline in ADLs/transfer ability and discharge planning recommendations. No previous OT services for current condition.  -ES     Row Name 01/12/23 1446          Living Environment    People in Home alone  reports family lives next door, able to assist if needed  -ES     Row Name 01/12/23 1446          Home Main Entrance    Number of Stairs, Main Entrance three  -ES     Stair Railings, Main Entrance railings safe and in good condition  -ES     Row Name 01/12/23 1446          Stairs Within Home, Primary    Number of Stairs, Within Home, Primary none  -ES     Row Name 01/12/23 1446          Cognition    Orientation Status (Cognition) oriented x 3  patient pleasant and cooperative, agreeable to therapy evaluation  -ES           User Key  (r) = Recorded By, (t) = Taken By, (c) = Cosigned By    Initials Name Provider Type    ES Sunni Milan, OT Occupational Therapist                 Mobility/ADL's     Row Name 01/12/23 1450          Bed Mobility    Bed Mobility supine-sit;sit-supine  -ES     Supine-Sit [place] (Bed Mobility) independent  -ES     Sit-Supine [place] (Bed Mobility) independent  -ES     Row Name 01/12/23 1450          Transfers    Transfers sit-stand transfer;stand-sit transfer;toilet transfer  -ES     Row Name 01/12/23 1450          Sit-Stand Transfer    Sit-Stand [place] (Transfers) standby assist  -ES     Assistive Device (Sit-Stand Transfers) other (see comments)  no AD  -ES     Row Name 01/12/23 1450          Stand-Sit Transfer    Stand-Sit [place] (Transfers) standby assist  -ES     Assistive Device (Stand-Sit Transfers) other (see comments)  no AD  -ES     Row Name 01/12/23 1450          Toilet Transfer    Type (Toilet Transfer) stand pivot/stand step  -ES     [place] Level (Toilet Transfer) standby assist  -ES     Assistive Device (Toilet Transfer) other (see comments)   no AD  -ES     Row Name 01/12/23 1450          Functional Mobility    Functional Mobility- Ind. Level standby assist  -ES     Functional Mobility- Device other (see comments)  no AD  -ES     Row Name 01/12/23 1450          Activities of Daily Living    BADL Assessment/Intervention bathing;upper body dressing;lower body dressing;grooming;feeding;toileting  -ES     Row Name 01/12/23 1450          Bathing Assessment/Intervention    [place] Level (Bathing) bathing skills;independent  -ES     Row Name 01/12/23 1450          Upper Body Dressing Assessment/Training    [place] Level (Upper Body Dressing) upper body dressing skills;independent  -ES     Row Name 01/12/23 1450          Lower Body Dressing Assessment/Training    [place] Level (Lower Body Dressing) lower body dressing skills;independent  -ES     Row Name 01/12/23 1450          Grooming Assessment/Training    [place] Level (Grooming) grooming skills;independent  -ES     Row Name 01/12/23 1450          Self-Feeding Assessment/Training    [place] Level (Feeding) feeding skills;independent  -ES     Row Name 01/12/23 1450          Toileting Assessment/Training    [place] Level (Toileting) toileting skills;independent  -ES           User Key  (r) = Recorded By, (t) = Taken By, (c) = Cosigned By    Initials Name Provider Type    ES Sunni Milan OT Occupational Therapist               Obj/Interventions     Row Name 01/12/23 1451          Sensory Assessment (Somatosensory)    Sensory Assessment (Somatosensory) sensation intact  -ES     Row Name 01/12/23 1451          Vision Assessment/Intervention    Visual Impairment/Limitations WFL  -ES     Row Name 01/12/23 1451          Range of Motion Comprehensive    General Range of Motion no range of motion deficits identified  -ES     Row Name 01/12/23 1451          Strength Comprehensive (MMT)    General Manual Muscle Testing (MMT) Assessment no strength deficits identified  -ES     Comment,  General Manual Muscle Testing (MMT) Assessment bilateral upper extremities assessed 4/5  -ES     Row Name 01/12/23 1451          Motor Skills    Motor Skills functional endurance;coordination  -ES     Coordination WFL  -ES     Functional Endurance fair plus  -ES     Row Name 01/12/23 1451          Balance    Balance Assessment sitting dynamic balance;standing dynamic balance  -ES     Dynamic Sitting Balance independent  -ES     Position, Sitting Balance unsupported;sitting edge of bed  -ES     Dynamic Standing Balance standby assist  -ES     Position/Device Used, Standing Balance unsupported;other (see comments)  no AD  -ES           User Key  (r) = Recorded By, (t) = Taken By, (c) = Cosigned By    Initials Name Provider Type    Sunni Rosas, PRUA Occupational Therapist               Goals/Plan    No documentation.                Clinical Impression     Row Name 01/12/23 1452          Plan of Care Review    Plan of Care Reviewed With patient  -ES     Progress no change  initial evaluation encounter  -ES     Outcome Evaluation Patient presents at or near baseline functional status with no functional deficits related to functional balance, safety awareness, transfers or mobility that impede patient independence with activities of daily living.  No indicated need for skilled occupational therapy intervention in the acute care setting.  Occupational therapy will sign off at this time. Recommend return home at time of discharge.  -ES     Row Name 01/12/23 1452          Therapy Assessment/Plan (OT)    Criteria for Skilled Therapeutic Interventions Met (OT) no problems identified which require skilled intervention  -ES     Therapy Frequency (OT) evaluation only  -ES     Row Name 01/12/23 1452          Therapy Plan Review/Discharge Plan (OT)    Anticipated Discharge Disposition (OT) home  -ES           User Key  (r) = Recorded By, (t) = Taken By, (c) = Cosigned By    Initials Name Provider Type    Sunni Rosas, PURA  Occupational Therapist               Outcome Measures     Row Name 01/12/23 1453          How much help from another is currently needed...    Putting on and taking off regular lower body clothing? 4  -ES     Bathing (including washing, rinsing, and drying) 4  -ES     Toileting (which includes using toilet bed pan or urinal) 4  -ES     Putting on and taking off regular upper body clothing 4  -ES     Taking care of personal grooming (such as brushing teeth) 4  -ES     Eating meals 4  -ES     AM-PAC 6 Clicks Score (OT) 24  -ES     Row Name 01/12/23 1100 01/12/23 0735       How much help from another person do you currently need...    Turning from your back to your side while in flat bed without using bedrails? 4  -MAGNO 4  -MS    Moving from lying on back to sitting on the side of a flat bed without bedrails? 4  -MAGNO 4  -MS    Moving to and from a bed to a chair (including a wheelchair)? 4  -MAGNO 4  -MS    Standing up from a chair using your arms (e.g., wheelchair, bedside chair)? 4  -MAGNO 4  -MS    Climbing 3-5 steps with a railing? 4  -MAGNO 4  -MS    To walk in hospital room? 4  -MAGNO 4  -MS    AM-PAC 6 Clicks Score (PT) 24  -MAGNO 24  -MS    Highest level of mobility 8 --> Walked 250 feet or more  -MAGNO 8 --> Walked 250 feet or more  -MS    Row Name 01/12/23 1453          Functional Assessment    Outcome Measure Options AM-PAC 6 Clicks Daily Activity (OT);Optimal Instrument  -ES     Row Name 01/12/23 1453          Optimal Instrument    Optimal Instrument Optimal - 3  -ES     Bending/Stooping 1  -ES     Standing 1  -ES     Reaching 1  -ES     From the list, choose the 3 activities you would most like to be able to do without any difficulty Bending/stooping;Standing;Reaching  -ES     Total Score Optimal - 3 3  -ES           User Key  (r) = Recorded By, (t) = Taken By, (c) = Cosigned By    Initials Name Provider Type    Dorothy Danielson, RN Registered Nurse    Lamont Pearson, PT Physical Therapist    Sunni Rosas, OT  Occupational Therapist                  OT Recommendation and Plan  Therapy Frequency (OT): evaluation only  Plan of Care Review  Plan of Care Reviewed With: patient  Progress: no change (initial evaluation encounter)  Outcome Evaluation: Patient presents at or near baseline functional status with no functional deficits related to functional balance, safety awareness, transfers or mobility that impede patient independence with activities of daily living.  No indicated need for skilled occupational therapy intervention in the acute care setting.  Occupational therapy will sign off at this time. Recommend return home at time of discharge.     Time Calculation:    Time Calculation- OT     Row Name 01/12/23 1454             Time Calculation- OT    OT Received On 01/12/23  -ES         Untimed Charges    OT Eval/Re-eval Minutes 31  -ES         Total Minutes    Untimed Charges Total Minutes 31  -ES       Total Minutes 31  -ES            User Key  (r) = Recorded By, (t) = Taken By, (c) = Cosigned By    Initials Name Provider Type    ES Sunni Milan OT Occupational Therapist              Therapy Charges for Today     Code Description Service Date Service Provider Modifiers Qty    60640760978 HC OT EVAL LOW COMPLEXITY 3 1/12/2023 Sunni Milan OT GO 1               Sunni Milan OT  1/12/2023

## 2023-01-12 NOTE — PLAN OF CARE
Goal Outcome Evaluation:  Plan of Care Reviewed With: patient        Progress: no change  Outcome Evaluation: Took over pt care at 0200. No complaints at this time. VSS. Call light within reach

## 2023-01-12 NOTE — PLAN OF CARE
Goal Outcome Evaluation:  Plan of Care Reviewed With: patient           Outcome Evaluation: Pt. demonstrates safety with bed mobility and transfers, is able to perform with safe mechanics. Pt. is able to ambulate with RW and safe mechanics, no gait deviations or loss of balance. Recommend discharge home at this time due to safety with functional mobility.

## 2023-01-13 VITALS
HEART RATE: 92 BPM | DIASTOLIC BLOOD PRESSURE: 89 MMHG | BODY MASS INDEX: 17.07 KG/M2 | OXYGEN SATURATION: 98 % | TEMPERATURE: 97.5 F | SYSTOLIC BLOOD PRESSURE: 178 MMHG | HEIGHT: 59 IN | WEIGHT: 84.66 LBS | RESPIRATION RATE: 20 BRPM

## 2023-01-13 PROBLEM — N17.9 AKI (ACUTE KIDNEY INJURY): Status: RESOLVED | Noted: 2023-01-10 | Resolved: 2023-01-13

## 2023-01-13 PROBLEM — N39.0 URINARY TRACT INFECTION WITHOUT HEMATURIA, SITE UNSPECIFIED: Status: RESOLVED | Noted: 2023-01-01 | Resolved: 2023-01-01

## 2023-01-13 LAB
BASOPHILS # BLD AUTO: 0.03 10*3/MM3 (ref 0–0.2)
BASOPHILS NFR BLD AUTO: 0.6 % (ref 0–1.5)
DEPRECATED RDW RBC AUTO: 49.2 FL (ref 37–54)
EOSINOPHIL # BLD AUTO: 0.09 10*3/MM3 (ref 0–0.4)
EOSINOPHIL NFR BLD AUTO: 1.7 % (ref 0.3–6.2)
ERYTHROCYTE [DISTWIDTH] IN BLOOD BY AUTOMATED COUNT: 13.5 % (ref 12.3–15.4)
HCT VFR BLD AUTO: 37.8 % (ref 34–46.6)
HGB BLD-MCNC: 12.5 G/DL (ref 12–15.9)
IMM GRANULOCYTES # BLD AUTO: 0.02 10*3/MM3 (ref 0–0.05)
IMM GRANULOCYTES NFR BLD AUTO: 0.4 % (ref 0–0.5)
LYMPHOCYTES # BLD AUTO: 0.2 10*3/MM3 (ref 0.7–3.1)
LYMPHOCYTES NFR BLD AUTO: 3.7 % (ref 19.6–45.3)
MCH RBC QN AUTO: 32.6 PG (ref 26.6–33)
MCHC RBC AUTO-ENTMCNC: 33.1 G/DL (ref 31.5–35.7)
MCV RBC AUTO: 98.4 FL (ref 79–97)
MONOCYTES # BLD AUTO: 0.78 10*3/MM3 (ref 0.1–0.9)
MONOCYTES NFR BLD AUTO: 14.6 % (ref 5–12)
NEUTROPHILS NFR BLD AUTO: 4.24 10*3/MM3 (ref 1.7–7)
NEUTROPHILS NFR BLD AUTO: 79 % (ref 42.7–76)
NRBC BLD AUTO-RTO: 0 /100 WBC (ref 0–0.2)
PLATELET # BLD AUTO: 163 10*3/MM3 (ref 140–450)
PMV BLD AUTO: 9.8 FL (ref 6–12)
RBC # BLD AUTO: 3.84 10*6/MM3 (ref 3.77–5.28)
WBC NRBC COR # BLD: 5.36 10*3/MM3 (ref 3.4–10.8)

## 2023-01-13 PROCEDURE — 25010000002 ENOXAPARIN PER 10 MG: Performed by: STUDENT IN AN ORGANIZED HEALTH CARE EDUCATION/TRAINING PROGRAM

## 2023-01-13 PROCEDURE — 85025 COMPLETE CBC W/AUTO DIFF WBC: CPT | Performed by: STUDENT IN AN ORGANIZED HEALTH CARE EDUCATION/TRAINING PROGRAM

## 2023-01-13 RX ORDER — CEFDINIR 300 MG/1
300 CAPSULE ORAL 2 TIMES DAILY
Qty: 6 CAPSULE | Refills: 0 | Status: SHIPPED | OUTPATIENT
Start: 2023-01-13 | End: 2023-01-16

## 2023-01-13 RX ADMIN — BUSPIRONE HYDROCHLORIDE 10 MG: 10 TABLET ORAL at 09:06

## 2023-01-13 RX ADMIN — FAMOTIDINE 40 MG: 20 TABLET, FILM COATED ORAL at 09:06

## 2023-01-13 RX ADMIN — POTASSIUM & SODIUM PHOSPHATES POWDER PACK 280-160-250 MG 1 PACKET: 280-160-250 PACK at 09:05

## 2023-01-13 RX ADMIN — ENOXAPARIN SODIUM 30 MG: 100 INJECTION SUBCUTANEOUS at 09:06

## 2023-01-13 RX ADMIN — Medication 10 ML: at 09:06

## 2023-01-13 RX ADMIN — POTASSIUM & SODIUM PHOSPHATES POWDER PACK 280-160-250 MG 1 PACKET: 280-160-250 PACK at 11:58

## 2023-01-13 RX ADMIN — METOPROLOL SUCCINATE 25 MG: 25 TABLET, EXTENDED RELEASE ORAL at 09:06

## 2023-01-13 NOTE — DISCHARGE SUMMARY
[facility]   DISCHARGE SUMMARY    Patient Name: Sandra Sylvester  : 1945  MRN: [id number]    Date of Admission: 2023  Date of Discharge: 2023  Primary Care Physician: Ren Garrett DPM    Consults     Date and Time Order Name Status Description    2023 11:29 PM IP General Consult (Use specialty-specific consult if known)            Hospital Course     Presenting Problem:   Hyperkalemia [E87.5]  Urinary tract infection without hematuria, site unspecified [N39.0]  Sepsis, due to unspecified organism, unspecified whether acute organ dysfunction present (HCC) [A41.9]    Active and resolved problems  Principal Problem (Resolved):    Urinary tract infection without hematuria, site unspecified  Overview:  Active Problems:    HTN (hypertension)  Overview:    Malnourished (HCC)  Overview:    BMI less than 19,adult  Overview:    Severe malnutrition (HCC)  Overview:  Resolved Problems:    KATE (acute kidney injury) (HCC)  Overview:       Hospital Course:  Sandra Sylvester is a 77 y.o. female 77 y.o. female With past medical history of hypertension, hyperlipidemia, depression who was in her usual state of health when she started experiencing lower back pain for the last 3 weeks.  She also had associated dysuria nausea and vomiting found to be septic with likely source being UTI associated with elevated lactate and white count and creatinine rise from an unknown baseline to 1.84 likely denoting an KATE.  She improved with IV fluids and has been initiated on antibiotics and quickly improved within 48 hrs. PT cleared to go home and being discharged on cefdinir for 3 days      Vital Signs:  Temp:  [98.3 °F (36.8 °C)-98.9 °F (37.2 °C)] 98.4 °F (36.9 °C)  Heart Rate:  [77-96] 88  Resp:  [16-22] 18  BP: (126-157)/(53-77) 150/68      Discharge Details        Discharge Medications      New Medications      Instructions Start Date   cefdinir 300 MG capsule  Commonly known as: OMNICEF   300 mg, Oral, 2 Times  Daily         Changes to Medications      Instructions Start Date   diclofenac 50 MG EC tablet  Commonly known as: VOLTAREN  What changed:   · when to take this  · reasons to take this   50 mg, Oral, Daily PRN         Continue These Medications      Instructions Start Date   acetaminophen 500 MG tablet  Commonly known as: TYLENOL   500 mg, Oral, Every 6 Hours PRN      atorvastatin 40 MG tablet  Commonly known as: LIPITOR   40 mg, Oral, Daily      busPIRone 10 MG tablet  Commonly known as: BUSPAR   10 mg, Oral, 3 Times Daily      cyclobenzaprine 10 MG tablet  Commonly known as: FLEXERIL   10 mg, Oral, Nightly PRN      HYDROcodone-acetaminophen 5-325 MG per tablet  Commonly known as: NORCO   1 tablet, Oral, Every 6 Hours PRN      lisinopril 20 MG tablet  Commonly known as: PRINIVIL,ZESTRIL   20 mg, Oral, Daily      metoprolol succinate XL 25 MG 24 hr tablet  Commonly known as: TOPROL-XL   25 mg, Oral, Daily      Oyster Shell Calcium/D 500-10 MG-MCG tablet tablet   1 tablet, Oral, Daily         Stop These Medications    alendronate 70 MG tablet  Commonly known as: FOSAMAX            No Known Allergies      Discharge Disposition:  Home or Self Care    Diet:  Diet Instructions     Advance Diet As Tolerated              Discharge Activity:   Activity Instructions     Activity as Tolerated              CODE STATUS:    Code Status and Medical Interventions:   Ordered at: 01/09/23 5637     Code Status (Patient has no pulse and is not breathing):    CPR (Attempt to Resuscitate)     Medical Interventions (Patient has pulse or is breathing):    Full Support         No future appointments.    Additional Instructions for the Follow-ups that You Need to Schedule     Discharge Follow-up with PCP   As directed       Currently Documented PCP:    Ren Garrett DPM    PCP Phone Number:    [phone]     Follow Up Details: 1 week               Time spent on Discharge including face to face service:  15 minutes    Electronically signed  by Louis Fernandez MD, 01/13/23, 8:20 AM EST.

## 2023-01-13 NOTE — PLAN OF CARE
Goal Outcome Evaluation:      Pt is alert with some confusion intermittently.  Pt had minimal pain and just wanted the pain pad.  She was not able to sleep much and kept getting up through night.  ERIN SANTA RN

## 2023-01-13 NOTE — NURSING NOTE
Patient got really confused about 0530 and stating she was going to leave and we couldn't stop her.  I attempted to give medication to relax her and she refused.  Called her daughter and she still was not content.  Got a safety watch until she is not so confused and the physician sees her.

## 2023-01-13 NOTE — PLAN OF CARE
Goal Outcome Evaluation:  Plan of Care Reviewed With: patient   Pt. To dc home with daughter       Progress: improving

## 2023-01-14 ENCOUNTER — READMISSION MANAGEMENT (OUTPATIENT)
Dept: CALL CENTER | Facility: HOSPITAL | Age: 78
End: 2023-01-14
Payer: MEDICARE

## 2023-01-14 LAB
BACTERIA SPEC AEROBE CULT: NORMAL
BACTERIA SPEC AEROBE CULT: NORMAL

## 2023-01-14 NOTE — OUTREACH NOTE
Prep Survey    Flowsheet Row Responses   [facility] facility patient discharged from? [facility]   Is LACE score < 7 ? No   Eligibility Readm Mgmt   Discharge diagnosis Sepsis, due to unspecified organism   Does the patient have one of the following disease processes/diagnoses(primary or secondary)? Sepsis   Does the patient have Home health ordered? No   Is there a DME ordered? No   Prep survey completed? Yes          KIRSTEN MORROW - Registered Nurse

## 2023-01-18 ENCOUNTER — READMISSION MANAGEMENT (OUTPATIENT)
Dept: CALL CENTER | Facility: HOSPITAL | Age: 78
End: 2023-01-18
Payer: MEDICARE

## 2023-01-18 NOTE — OUTREACH NOTE
Sepsis Week 1 Survey    Flowsheet Row Responses   [facility] patient discharged from? [facility]   Does the patient have one of the following disease processes/diagnoses(primary or secondary)? Sepsis   Week 1 attempt successful? Yes   Call start time 1400   Call end time 1416   Discharge diagnosis Sepsis, due to unspecified organism   Is patient permission given to speak with other caregiver? Yes   Person spoke with today (if not patient) and relationship Melissa   Meds reviewed with patient/caregiver? Yes   Is the patient having any side effects they believe may be caused by any medication additions or changes? No   Does the patient have all medications related to this admission filled (includes all antibiotics, inhalers, nebulizers,steroids,etc.) Yes   Is the patient taking all medications as directed (includes completed medication regime)? Yes   Does the patient have a primary care provider?  Yes   Does the patient have an appointment with their PCP within 7 days of discharge? Yes   Has the patient kept scheduled appointments due by today? Yes   Has home health visited the patient within 72 hours of discharge? N/A   Psychosocial issues? No   Did the patient receive a copy of their discharge instructions? Yes   Nursing interventions Reviewed instructions with patient   What is the patient's perception of their health status since discharge? Improving  [back pain is still present.]   Nursing interventions Nurse provided patient education   Is the patient/caregiver able to teach back TIME? T emperature - higher or lower than normal, M ental Decline - confused, sleepy, difficult to arouse, I nfection - may have signs and symptoms of an infection, E xtremely Ill - severe pain, discomfort, shortness of breath   Nursing interventions Nurse provided patient education   Is patient/caregiver able to teach back steps to recovery at home? Set small, achievable goals for return to baseline health, Rest and regain strength, Eat  a balanced diet, Make a list of questions for PCP appoinment   Is the patient/caregiver able to teach back signs and symptoms of worsening condition: Fever, Edema, Shortness of breath/rapid respiratory rate   If the patient is a current smoker, are they able to teach back resources for cessation? Not a smoker   Is the patient/caregiver able to teach back the hierarchy of who to call/visit for symptoms/problems? PCP, Specialist, Home health nurse, Urgent Care, ED, 911 Yes   Additional teach back comments She will reach out to [facility] dental [facility] to get new dentures.   Week 1 call completed? Yes   Wrap up additional comments They are wanting another urine sample now abx are done.          OUMAR DE LA ROSA - Registered Nurse

## 2023-01-26 ENCOUNTER — READMISSION MANAGEMENT (OUTPATIENT)
Dept: CALL CENTER | Facility: HOSPITAL | Age: 78
End: 2023-01-26
Payer: MEDICARE

## 2023-01-26 NOTE — OUTREACH NOTE
Sepsis Week 2 Survey    Flowsheet Row Responses   [facility] facility patient discharged from? [facility]   Does the patient have one of the following disease processes/diagnoses(primary or secondary)? Sepsis   Week 2 attempt successful? No   Unsuccessful attempts Attempt 1          WALT VARGAS - Registered Nurse

## 2023-01-30 ENCOUNTER — READMISSION MANAGEMENT (OUTPATIENT)
Dept: CALL CENTER | Facility: HOSPITAL | Age: 78
End: 2023-01-30
Payer: MEDICARE

## 2023-01-30 NOTE — OUTREACH NOTE
Sepsis Week 2 Survey    Flowsheet Row Responses   [facility] facility patient discharged from? [facility]   Does the patient have one of the following disease processes/diagnoses(primary or secondary)? Sepsis   Week 2 attempt successful? No   Unsuccessful attempts Attempt 2          MITRA JEFFRIES - Registered Nurse

## 2023-02-08 ENCOUNTER — READMISSION MANAGEMENT (OUTPATIENT)
Dept: CALL CENTER | Facility: HOSPITAL | Age: 78
End: 2023-02-08
Payer: MEDICARE

## 2023-02-08 NOTE — OUTREACH NOTE
Sepsis Week 3 Survey    Flowsheet Row Responses   [facility] [place] patient discharged from? [facility]   Does the patient have one of the following disease processes/diagnoses(primary or secondary)? Sepsis   Week 3 attempt successful? Yes   Call start time 1742   Call end time 1752   Discharge diagnosis sepsis, UTI   Is patient permission given to speak with other caregiver? Yes   List who call center can speak with PAUL MORALES Daughter    Person spoke with today (if not patient) and relationship PAUL MORALES Daughter    Meds reviewed with patient/caregiver? Yes   Does the patient have all medications related to this admission filled (includes all antibiotics, inhalers, nebulizers,steroids,etc.) Yes   Is the patient taking all medications as directed (includes completed medication regime)? Yes   Does the patient have a primary care provider?  Yes   Comments regarding PCP PCP Dr Garrett,  next appt 2/14   Does the patient have an appointment with their PCP within 7 days of discharge? Yes   Has the patient kept scheduled appointments due by today? Yes   Has home health visited the patient within 72 hours of discharge? N/A   Psychosocial issues? No   Did the patient receive a copy of their discharge instructions? Yes   Nursing interventions Reviewed instructions with patient   What is the patient's perception of their health status since discharge? Improving   Nursing interventions Nurse provided patient education   Is the patient/caregiver able to teach back TIME? T emperature - higher or lower than normal   Nursing interventions Nurse provided reassurance to patient, Nurse provided patient education   Is patient/caregiver able to teach back steps to recovery at home? Set small, achievable goals for return to baseline health, Rest and regain strength   Is the patient/caregiver able to teach back signs and symptoms of worsening condition: Fever   Is the patient/caregiver able to teach back the hierarchy of who to call/visit for  symptoms/problems? PCP, Specialist, Home health nurse, Urgent Care, ED, 911 Yes   Week 3 call completed? Yes          SHAW BATES - Registered Nurse

## 2023-07-19 ENCOUNTER — APPOINTMENT (OUTPATIENT)
Dept: GENERAL RADIOLOGY | Facility: HOSPITAL | Age: 78
DRG: 193 | End: 2023-07-19
Payer: MEDICARE

## 2023-07-19 ENCOUNTER — HOSPITAL ENCOUNTER (INPATIENT)
Facility: HOSPITAL | Age: 78
LOS: 13 days | Discharge: HOME | DRG: 193 | End: 2023-08-01
Attending: EMERGENCY MEDICINE | Admitting: INTERNAL MEDICINE
Payer: MEDICARE

## 2023-07-19 DIAGNOSIS — J44.1 COPD WITH ACUTE EXACERBATION: ICD-10-CM

## 2023-07-19 DIAGNOSIS — J18.9 PNEUMONIA DUE TO INFECTIOUS ORGANISM, UNSPECIFIED LATERALITY, UNSPECIFIED PART OF LUNG: ICD-10-CM

## 2023-07-19 DIAGNOSIS — S32.409D CLOSED NONDISPLACED FRACTURE OF ACETABULUM WITH ROUTINE HEALING, UNSPECIFIED PORTION OF ACETABULUM, UNSPECIFIED LATERALITY, SUBSEQUENT ENCOUNTER: Primary | ICD-10-CM

## 2023-07-19 DIAGNOSIS — R26.2 DIFFICULTY WALKING: ICD-10-CM

## 2023-07-19 DIAGNOSIS — R09.02 HYPOXIA: ICD-10-CM

## 2023-07-19 DIAGNOSIS — Z78.9 DECREASED ACTIVITIES OF DAILY LIVING (ADL): ICD-10-CM

## 2023-07-19 DIAGNOSIS — J96.01 ACUTE RESPIRATORY FAILURE WITH HYPOXIA: ICD-10-CM

## 2023-07-19 LAB
ALBUMIN SERPL-MCNC: 3.5 G/DL (ref 3.5–5.2)
ALBUMIN/GLOB SERPL: 1.1 G/DL
ALP SERPL-CCNC: 193 U/L (ref 39–117)
ALT SERPL W P-5'-P-CCNC: 11 U/L (ref 1–33)
ANION GAP SERPL CALCULATED.3IONS-SCNC: 15.8 MMOL/L (ref 5–15)
ARTERIAL PATENCY WRIST A: ABNORMAL
AST SERPL-CCNC: 29 U/L (ref 1–32)
BASE EXCESS BLDA CALC-SCNC: -0.7 MMOL/L (ref -2–2)
BASOPHILS # BLD AUTO: 0.03 10*3/MM3 (ref 0–0.2)
BASOPHILS NFR BLD AUTO: 0.3 % (ref 0–1.5)
BDY SITE: ABNORMAL
BILIRUB SERPL-MCNC: 0.8 MG/DL (ref 0–1.2)
BUN SERPL-MCNC: 14 MG/DL (ref 8–23)
BUN/CREAT SERPL: 19.4 (ref 7–25)
CA-I BLDA-SCNC: 1.15 MMOL/L (ref 1.13–1.32)
CALCIUM SPEC-SCNC: 9.8 MG/DL (ref 8.6–10.5)
CHLORIDE BLDA-SCNC: 101 MMOL/L (ref 98–106)
CHLORIDE SERPL-SCNC: 99 MMOL/L (ref 98–107)
CO2 SERPL-SCNC: 24.2 MMOL/L (ref 22–29)
COHGB MFR BLD: 1.6 % (ref 0–1.5)
CREAT SERPL-MCNC: 0.72 MG/DL (ref 0.57–1)
D-LACTATE SERPL-SCNC: 2.8 MMOL/L (ref 0.5–2)
DEPRECATED RDW RBC AUTO: 58 FL (ref 37–54)
EGFRCR SERPLBLD CKD-EPI 2021: 84.6 ML/MIN/1.73
EOSINOPHIL # BLD AUTO: 0.23 10*3/MM3 (ref 0–0.4)
EOSINOPHIL NFR BLD AUTO: 2.3 % (ref 0.3–6.2)
ERYTHROCYTE [DISTWIDTH] IN BLOOD BY AUTOMATED COUNT: 16.5 % (ref 12.3–15.4)
FHHB: 7.2 % (ref 0–5)
FLUAV AG NPH QL: NEGATIVE
FLUBV AG NPH QL IA: NEGATIVE
GAS FLOW AIRWAY: 1 LPM
GLOBULIN UR ELPH-MCNC: 3.2 GM/DL
GLUCOSE BLDA-MCNC: 87 MG/DL (ref 65–99)
GLUCOSE SERPL-MCNC: 107 MG/DL (ref 65–99)
HCO3 BLDA-SCNC: 22.6 MMOL/L (ref 22–26)
HCT VFR BLD AUTO: 42.1 % (ref 34–46.6)
HGB BLD-MCNC: 13.8 G/DL (ref 12–15.9)
HGB BLDA-MCNC: 13.9 G/DL (ref 11.7–14.6)
HOLD SPECIMEN: NORMAL
HOLD SPECIMEN: NORMAL
IMM GRANULOCYTES # BLD AUTO: 0.03 10*3/MM3 (ref 0–0.05)
IMM GRANULOCYTES NFR BLD AUTO: 0.3 % (ref 0–0.5)
INHALED O2 CONCENTRATION: 24 %
LACTATE BLDA-SCNC: 2.53 MMOL/L (ref 0.5–2)
LYMPHOCYTES # BLD AUTO: 0.37 10*3/MM3 (ref 0.7–3.1)
LYMPHOCYTES NFR BLD AUTO: 3.7 % (ref 19.6–45.3)
MCH RBC QN AUTO: 31.2 PG (ref 26.6–33)
MCHC RBC AUTO-ENTMCNC: 32.8 G/DL (ref 31.5–35.7)
MCV RBC AUTO: 95 FL (ref 79–97)
METHGB BLD QL: 0.2 % (ref 0–1.5)
MODALITY: ABNORMAL
MONOCYTES # BLD AUTO: 0.67 10*3/MM3 (ref 0.1–0.9)
MONOCYTES NFR BLD AUTO: 6.6 % (ref 5–12)
NEUTROPHILS NFR BLD AUTO: 8.76 10*3/MM3 (ref 1.7–7)
NEUTROPHILS NFR BLD AUTO: 86.8 % (ref 42.7–76)
NOTE: ABNORMAL
NRBC BLD AUTO-RTO: 0 /100 WBC (ref 0–0.2)
NT-PROBNP SERPL-MCNC: 1491 PG/ML (ref 0–1800)
OXYHGB MFR BLDV: 91 % (ref 94–99)
PCO2 BLDA: 33.3 MM HG (ref 35–45)
PH BLDA: 7.45 PH UNITS (ref 7.35–7.45)
PLATELET # BLD AUTO: 270 10*3/MM3 (ref 140–450)
PMV BLD AUTO: 9.4 FL (ref 6–12)
PO2 BLD: 272 MM[HG] (ref 0–500)
PO2 BLDA: 65.2 MM HG (ref 80–100)
POTASSIUM BLDA-SCNC: 3.95 MMOL/L (ref 3.5–5)
POTASSIUM SERPL-SCNC: 4.7 MMOL/L (ref 3.5–5.2)
PROT SERPL-MCNC: 6.7 G/DL (ref 6–8.5)
RBC # BLD AUTO: 4.43 10*6/MM3 (ref 3.77–5.28)
SAO2 % BLDCOA: 92.7 % (ref 95–99)
SARS-COV-2 RNA RESP QL NAA+PROBE: NOT DETECTED
SODIUM BLDA-SCNC: 135.3 MMOL/L (ref 136–146)
SODIUM SERPL-SCNC: 139 MMOL/L (ref 136–145)
TROPONIN T SERPL HS-MCNC: 19 NG/L
WBC NRBC COR # BLD: 10.09 10*3/MM3 (ref 3.4–10.8)
WHOLE BLOOD HOLD COAG: NORMAL
WHOLE BLOOD HOLD SPECIMEN: NORMAL

## 2023-07-19 PROCEDURE — 83050 HGB METHEMOGLOBIN QUAN: CPT | Performed by: EMERGENCY MEDICINE

## 2023-07-19 PROCEDURE — 99285 EMERGENCY DEPT VISIT HI MDM: CPT

## 2023-07-19 PROCEDURE — 85025 COMPLETE CBC W/AUTO DIFF WBC: CPT

## 2023-07-19 PROCEDURE — 25010000002 METHYLPREDNISOLONE PER 125 MG: Performed by: EMERGENCY MEDICINE

## 2023-07-19 PROCEDURE — 82805 BLOOD GASES W/O2 SATURATION: CPT | Performed by: EMERGENCY MEDICINE

## 2023-07-19 PROCEDURE — 87804 INFLUENZA ASSAY W/OPTIC: CPT | Performed by: EMERGENCY MEDICINE

## 2023-07-19 PROCEDURE — 25010000002 AZITHROMYCIN PER 500 MG: Performed by: EMERGENCY MEDICINE

## 2023-07-19 PROCEDURE — 86738 MYCOPLASMA ANTIBODY: CPT | Performed by: INTERNAL MEDICINE

## 2023-07-19 PROCEDURE — 93005 ELECTROCARDIOGRAM TRACING: CPT | Performed by: EMERGENCY MEDICINE

## 2023-07-19 PROCEDURE — 80053 COMPREHEN METABOLIC PANEL: CPT

## 2023-07-19 PROCEDURE — 93005 ELECTROCARDIOGRAM TRACING: CPT

## 2023-07-19 PROCEDURE — 83605 ASSAY OF LACTIC ACID: CPT | Performed by: EMERGENCY MEDICINE

## 2023-07-19 PROCEDURE — 84484 ASSAY OF TROPONIN QUANT: CPT

## 2023-07-19 PROCEDURE — 82375 ASSAY CARBOXYHB QUANT: CPT | Performed by: EMERGENCY MEDICINE

## 2023-07-19 PROCEDURE — 71045 X-RAY EXAM CHEST 1 VIEW: CPT

## 2023-07-19 PROCEDURE — 83880 ASSAY OF NATRIURETIC PEPTIDE: CPT

## 2023-07-19 PROCEDURE — 36600 WITHDRAWAL OF ARTERIAL BLOOD: CPT | Performed by: EMERGENCY MEDICINE

## 2023-07-19 PROCEDURE — 25010000002 CEFTRIAXONE PER 250 MG: Performed by: EMERGENCY MEDICINE

## 2023-07-19 PROCEDURE — 87040 BLOOD CULTURE FOR BACTERIA: CPT | Performed by: EMERGENCY MEDICINE

## 2023-07-19 PROCEDURE — 87635 SARS-COV-2 COVID-19 AMP PRB: CPT | Performed by: EMERGENCY MEDICINE

## 2023-07-19 PROCEDURE — 93010 ELECTROCARDIOGRAM REPORT: CPT | Performed by: INTERNAL MEDICINE

## 2023-07-19 RX ORDER — CEFTRIAXONE SODIUM 1 G/50ML
1000 INJECTION, SOLUTION INTRAVENOUS ONCE
Status: COMPLETED | OUTPATIENT
Start: 2023-07-19 | End: 2023-07-19

## 2023-07-19 RX ORDER — IPRATROPIUM BROMIDE AND ALBUTEROL SULFATE 2.5; .5 MG/3ML; MG/3ML
3 SOLUTION RESPIRATORY (INHALATION) ONCE
Status: COMPLETED | OUTPATIENT
Start: 2023-07-19 | End: 2023-07-19

## 2023-07-19 RX ORDER — METHYLPREDNISOLONE SODIUM SUCCINATE 125 MG/2ML
125 INJECTION, POWDER, LYOPHILIZED, FOR SOLUTION INTRAMUSCULAR; INTRAVENOUS ONCE
Status: COMPLETED | OUTPATIENT
Start: 2023-07-19 | End: 2023-07-19

## 2023-07-19 RX ORDER — SODIUM CHLORIDE 0.9 % (FLUSH) 0.9 %
10 SYRINGE (ML) INJECTION AS NEEDED
Status: DISCONTINUED | OUTPATIENT
Start: 2023-07-19 | End: 2023-08-01 | Stop reason: HOSPADM

## 2023-07-19 RX ADMIN — METHYLPREDNISOLONE SODIUM SUCCINATE 125 MG: 125 INJECTION INTRAMUSCULAR; INTRAVENOUS at 22:35

## 2023-07-19 RX ADMIN — IPRATROPIUM BROMIDE AND ALBUTEROL SULFATE 3 ML: .5; 3 SOLUTION RESPIRATORY (INHALATION) at 19:26

## 2023-07-19 RX ADMIN — CEFTRIAXONE SODIUM 1000 MG: 1 INJECTION, SOLUTION INTRAVENOUS at 22:37

## 2023-07-19 RX ADMIN — SODIUM CHLORIDE 500 MG: 9 INJECTION, SOLUTION INTRAVENOUS at 23:41

## 2023-07-20 PROBLEM — J44.1 COPD WITH ACUTE EXACERBATION: Status: ACTIVE | Noted: 2023-01-01

## 2023-07-20 PROBLEM — R09.02 HYPOXIA: Status: ACTIVE | Noted: 2023-01-01

## 2023-07-20 LAB
ANION GAP SERPL CALCULATED.3IONS-SCNC: 16.8 MMOL/L (ref 5–15)
BASOPHILS # BLD AUTO: 0.03 10*3/MM3 (ref 0–0.2)
BASOPHILS NFR BLD AUTO: 0.3 % (ref 0–1.5)
BUN SERPL-MCNC: 14 MG/DL (ref 8–23)
BUN/CREAT SERPL: 26.4 (ref 7–25)
CALCIUM SPEC-SCNC: 9.2 MG/DL (ref 8.6–10.5)
CHLORIDE SERPL-SCNC: 100 MMOL/L (ref 98–107)
CO2 SERPL-SCNC: 20.2 MMOL/L (ref 22–29)
CREAT SERPL-MCNC: 0.53 MG/DL (ref 0.57–1)
D-LACTATE SERPL-SCNC: 1.9 MMOL/L (ref 0.5–2)
D-LACTATE SERPL-SCNC: 2.3 MMOL/L (ref 0.5–2)
DEPRECATED RDW RBC AUTO: 57.5 FL (ref 37–54)
EGFRCR SERPLBLD CKD-EPI 2021: 93.6 ML/MIN/1.73
EOSINOPHIL # BLD AUTO: 0.05 10*3/MM3 (ref 0–0.4)
EOSINOPHIL NFR BLD AUTO: 0.6 % (ref 0.3–6.2)
ERYTHROCYTE [DISTWIDTH] IN BLOOD BY AUTOMATED COUNT: 16.6 % (ref 12.3–15.4)
GLUCOSE SERPL-MCNC: 112 MG/DL (ref 65–99)
HCT VFR BLD AUTO: 38.8 % (ref 34–46.6)
HGB BLD-MCNC: 12.8 G/DL (ref 12–15.9)
IMM GRANULOCYTES # BLD AUTO: 0.04 10*3/MM3 (ref 0–0.05)
IMM GRANULOCYTES NFR BLD AUTO: 0.4 % (ref 0–0.5)
L PNEUMO1 AG UR QL IA: NEGATIVE
LYMPHOCYTES # BLD AUTO: 0.2 10*3/MM3 (ref 0.7–3.1)
LYMPHOCYTES NFR BLD AUTO: 2.2 % (ref 19.6–45.3)
M PNEUMO IGM SER QL: NEGATIVE
MCH RBC QN AUTO: 31.1 PG (ref 26.6–33)
MCHC RBC AUTO-ENTMCNC: 33 G/DL (ref 31.5–35.7)
MCV RBC AUTO: 94.2 FL (ref 79–97)
MONOCYTES # BLD AUTO: 0.15 10*3/MM3 (ref 0.1–0.9)
MONOCYTES NFR BLD AUTO: 1.7 % (ref 5–12)
NEUTROPHILS NFR BLD AUTO: 8.46 10*3/MM3 (ref 1.7–7)
NEUTROPHILS NFR BLD AUTO: 94.8 % (ref 42.7–76)
NRBC BLD AUTO-RTO: 0 /100 WBC (ref 0–0.2)
PLATELET # BLD AUTO: 234 10*3/MM3 (ref 140–450)
PMV BLD AUTO: 9.4 FL (ref 6–12)
POTASSIUM SERPL-SCNC: 4.1 MMOL/L (ref 3.5–5.2)
QT INTERVAL: 392 MS
RBC # BLD AUTO: 4.12 10*6/MM3 (ref 3.77–5.28)
S PNEUM AG SPEC QL LA: NEGATIVE
SODIUM SERPL-SCNC: 137 MMOL/L (ref 136–145)
WBC NRBC COR # BLD: 8.93 10*3/MM3 (ref 3.4–10.8)

## 2023-07-20 PROCEDURE — 25010000002 METHYLPREDNISOLONE PER 40 MG: Performed by: INTERNAL MEDICINE

## 2023-07-20 PROCEDURE — 36415 COLL VENOUS BLD VENIPUNCTURE: CPT | Performed by: EMERGENCY MEDICINE

## 2023-07-20 PROCEDURE — 85025 COMPLETE CBC W/AUTO DIFF WBC: CPT | Performed by: INTERNAL MEDICINE

## 2023-07-20 PROCEDURE — 25010000002 ENOXAPARIN PER 10 MG: Performed by: INTERNAL MEDICINE

## 2023-07-20 PROCEDURE — 25010000002 SODIUM CHLORIDE 0.9 % WITH KCL 20 MEQ 20-0.9 MEQ/L-% SOLUTION: Performed by: INTERNAL MEDICINE

## 2023-07-20 PROCEDURE — 94799 UNLISTED PULMONARY SVC/PX: CPT

## 2023-07-20 PROCEDURE — 80048 BASIC METABOLIC PNL TOTAL CA: CPT | Performed by: INTERNAL MEDICINE

## 2023-07-20 PROCEDURE — 94640 AIRWAY INHALATION TREATMENT: CPT

## 2023-07-20 PROCEDURE — 25010000002 ONDANSETRON PER 1 MG: Performed by: EMERGENCY MEDICINE

## 2023-07-20 PROCEDURE — 87899 AGENT NOS ASSAY W/OPTIC: CPT | Performed by: INTERNAL MEDICINE

## 2023-07-20 PROCEDURE — 87449 NOS EACH ORGANISM AG IA: CPT | Performed by: INTERNAL MEDICINE

## 2023-07-20 PROCEDURE — 94664 DEMO&/EVAL PT USE INHALER: CPT

## 2023-07-20 PROCEDURE — 83605 ASSAY OF LACTIC ACID: CPT | Performed by: EMERGENCY MEDICINE

## 2023-07-20 PROCEDURE — 25010000002 ONDANSETRON PER 1 MG: Performed by: INTERNAL MEDICINE

## 2023-07-20 RX ORDER — IPRATROPIUM BROMIDE AND ALBUTEROL SULFATE 2.5; .5 MG/3ML; MG/3ML
3 SOLUTION RESPIRATORY (INHALATION)
Status: DISCONTINUED | OUTPATIENT
Start: 2023-07-20 | End: 2023-07-24

## 2023-07-20 RX ORDER — ATORVASTATIN CALCIUM 20 MG/1
20 TABLET, FILM COATED ORAL NIGHTLY
Status: DISCONTINUED | OUTPATIENT
Start: 2023-07-20 | End: 2023-08-01 | Stop reason: HOSPADM

## 2023-07-20 RX ORDER — ENOXAPARIN SODIUM 100 MG/ML
30 INJECTION SUBCUTANEOUS EVERY 24 HOURS
Status: DISCONTINUED | OUTPATIENT
Start: 2023-07-20 | End: 2023-07-20

## 2023-07-20 RX ORDER — TRAZODONE HYDROCHLORIDE 50 MG/1
50 TABLET ORAL NIGHTLY PRN
COMMUNITY

## 2023-07-20 RX ORDER — CLOBETASOL PROPIONATE 0.46 MG/ML
1 SOLUTION TOPICAL 2 TIMES DAILY PRN
COMMUNITY

## 2023-07-20 RX ORDER — ALBUTEROL SULFATE 90 UG/1
2 AEROSOL, METERED RESPIRATORY (INHALATION) EVERY 4 HOURS PRN
COMMUNITY

## 2023-07-20 RX ORDER — ACETAMINOPHEN 650 MG/1
650 SUPPOSITORY RECTAL EVERY 4 HOURS PRN
Status: DISCONTINUED | OUTPATIENT
Start: 2023-07-20 | End: 2023-08-01 | Stop reason: HOSPADM

## 2023-07-20 RX ORDER — MULTIPLE VITAMINS W/ MINERALS TAB 9MG-400MCG
1 TAB ORAL DAILY
COMMUNITY

## 2023-07-20 RX ORDER — SODIUM CHLORIDE 9 MG/ML
40 INJECTION, SOLUTION INTRAVENOUS AS NEEDED
Status: DISCONTINUED | OUTPATIENT
Start: 2023-07-20 | End: 2023-08-01 | Stop reason: HOSPADM

## 2023-07-20 RX ORDER — ONDANSETRON 2 MG/ML
4 INJECTION INTRAMUSCULAR; INTRAVENOUS ONCE
Status: COMPLETED | OUTPATIENT
Start: 2023-07-20 | End: 2023-07-20

## 2023-07-20 RX ORDER — PROMETHAZINE HYDROCHLORIDE 25 MG/1
25 SUPPOSITORY RECTAL ONCE
Status: COMPLETED | OUTPATIENT
Start: 2023-07-20 | End: 2023-07-20

## 2023-07-20 RX ORDER — HYDROCODONE BITARTRATE AND ACETAMINOPHEN 5; 325 MG/1; MG/1
1 TABLET ORAL EVERY 4 HOURS PRN
Status: DISCONTINUED | OUTPATIENT
Start: 2023-07-20 | End: 2023-07-26

## 2023-07-20 RX ORDER — NALOXONE HCL 0.4 MG/ML
0.4 VIAL (ML) INJECTION
Status: DISCONTINUED | OUTPATIENT
Start: 2023-07-20 | End: 2023-08-01 | Stop reason: HOSPADM

## 2023-07-20 RX ORDER — AMOXICILLIN 250 MG
2 CAPSULE ORAL NIGHTLY
Status: DISCONTINUED | OUTPATIENT
Start: 2023-07-20 | End: 2023-07-21

## 2023-07-20 RX ORDER — ATORVASTATIN CALCIUM 40 MG/1
40 TABLET, FILM COATED ORAL DAILY
Status: DISCONTINUED | OUTPATIENT
Start: 2023-07-20 | End: 2023-07-20 | Stop reason: DRUGHIGH

## 2023-07-20 RX ORDER — CALCIUM CARBONATE/VITAMIN D3 600 MG-10
1 TABLET ORAL DAILY
Status: DISCONTINUED | OUTPATIENT
Start: 2023-07-20 | End: 2023-08-01 | Stop reason: HOSPADM

## 2023-07-20 RX ORDER — ONDANSETRON 2 MG/ML
4 INJECTION INTRAMUSCULAR; INTRAVENOUS EVERY 6 HOURS PRN
Status: DISCONTINUED | OUTPATIENT
Start: 2023-07-20 | End: 2023-08-01 | Stop reason: HOSPADM

## 2023-07-20 RX ORDER — LISINOPRIL 20 MG/1
20 TABLET ORAL DAILY
Status: DISCONTINUED | OUTPATIENT
Start: 2023-07-20 | End: 2023-08-01 | Stop reason: HOSPADM

## 2023-07-20 RX ORDER — BISACODYL 10 MG
10 SUPPOSITORY, RECTAL RECTAL DAILY PRN
Status: DISCONTINUED | OUTPATIENT
Start: 2023-07-20 | End: 2023-08-01 | Stop reason: HOSPADM

## 2023-07-20 RX ORDER — POLYETHYLENE GLYCOL 3350 17 G/17G
17 POWDER, FOR SOLUTION ORAL DAILY PRN
Status: DISCONTINUED | OUTPATIENT
Start: 2023-07-20 | End: 2023-08-01 | Stop reason: HOSPADM

## 2023-07-20 RX ORDER — SODIUM CHLORIDE 0.9 % (FLUSH) 0.9 %
10 SYRINGE (ML) INJECTION AS NEEDED
Status: DISCONTINUED | OUTPATIENT
Start: 2023-07-20 | End: 2023-08-01 | Stop reason: HOSPADM

## 2023-07-20 RX ORDER — SODIUM CHLORIDE 0.9 % (FLUSH) 0.9 %
10 SYRINGE (ML) INJECTION EVERY 12 HOURS SCHEDULED
Status: DISCONTINUED | OUTPATIENT
Start: 2023-07-20 | End: 2023-08-01 | Stop reason: HOSPADM

## 2023-07-20 RX ORDER — DIPHENHYDRAMINE HYDROCHLORIDE, ZINC ACETATE 2; .1 G/100G; G/100G
1 CREAM TOPICAL 3 TIMES DAILY PRN
Status: DISCONTINUED | OUTPATIENT
Start: 2023-07-20 | End: 2023-08-01 | Stop reason: HOSPADM

## 2023-07-20 RX ORDER — METHYLPREDNISOLONE SODIUM SUCCINATE 40 MG/ML
40 INJECTION, POWDER, LYOPHILIZED, FOR SOLUTION INTRAMUSCULAR; INTRAVENOUS EVERY 8 HOURS
Status: DISCONTINUED | OUTPATIENT
Start: 2023-07-20 | End: 2023-07-27

## 2023-07-20 RX ORDER — SODIUM CHLORIDE AND POTASSIUM CHLORIDE 150; 900 MG/100ML; MG/100ML
100 INJECTION, SOLUTION INTRAVENOUS CONTINUOUS
Status: DISCONTINUED | OUTPATIENT
Start: 2023-07-20 | End: 2023-07-20

## 2023-07-20 RX ORDER — BUSPIRONE HYDROCHLORIDE 10 MG/1
10 TABLET ORAL 3 TIMES DAILY
Status: DISCONTINUED | OUTPATIENT
Start: 2023-07-20 | End: 2023-07-22

## 2023-07-20 RX ORDER — METOPROLOL SUCCINATE 25 MG/1
25 TABLET, EXTENDED RELEASE ORAL DAILY
Status: DISCONTINUED | OUTPATIENT
Start: 2023-07-20 | End: 2023-08-01 | Stop reason: HOSPADM

## 2023-07-20 RX ORDER — BISACODYL 5 MG/1
5 TABLET, DELAYED RELEASE ORAL DAILY PRN
Status: DISCONTINUED | OUTPATIENT
Start: 2023-07-20 | End: 2023-08-01 | Stop reason: HOSPADM

## 2023-07-20 RX ORDER — ACETAMINOPHEN 160 MG/5ML
650 SOLUTION ORAL EVERY 4 HOURS PRN
Status: DISCONTINUED | OUTPATIENT
Start: 2023-07-20 | End: 2023-08-01 | Stop reason: HOSPADM

## 2023-07-20 RX ORDER — CLOBETASOL PROPIONATE 0.5 MG/G
1 OINTMENT TOPICAL 2 TIMES DAILY PRN
COMMUNITY

## 2023-07-20 RX ORDER — FAMOTIDINE 20 MG/1
20 TABLET, FILM COATED ORAL DAILY
Status: DISCONTINUED | OUTPATIENT
Start: 2023-07-20 | End: 2023-08-01 | Stop reason: HOSPADM

## 2023-07-20 RX ORDER — NAPROXEN 500 MG/1
500 TABLET ORAL 2 TIMES DAILY PRN
COMMUNITY

## 2023-07-20 RX ORDER — ACETAMINOPHEN 325 MG/1
650 TABLET ORAL EVERY 4 HOURS PRN
Status: DISCONTINUED | OUTPATIENT
Start: 2023-07-20 | End: 2023-08-01 | Stop reason: HOSPADM

## 2023-07-20 RX ORDER — ENOXAPARIN SODIUM 100 MG/ML
30 INJECTION SUBCUTANEOUS EVERY 24 HOURS
Status: DISCONTINUED | OUTPATIENT
Start: 2023-07-20 | End: 2023-08-01 | Stop reason: HOSPADM

## 2023-07-20 RX ADMIN — DOCUSATE SODIUM 50MG AND SENNOSIDES 8.6MG 2 TABLET: 8.6; 5 TABLET, FILM COATED ORAL at 01:38

## 2023-07-20 RX ADMIN — IPRATROPIUM BROMIDE AND ALBUTEROL SULFATE 3 ML: 2.5; .5 SOLUTION RESPIRATORY (INHALATION) at 06:44

## 2023-07-20 RX ADMIN — BISACODYL 5 MG: 5 TABLET, COATED ORAL at 22:12

## 2023-07-20 RX ADMIN — FAMOTIDINE 20 MG: 20 TABLET ORAL at 08:43

## 2023-07-20 RX ADMIN — IPRATROPIUM BROMIDE AND ALBUTEROL SULFATE 3 ML: 2.5; .5 SOLUTION RESPIRATORY (INHALATION) at 15:00

## 2023-07-20 RX ADMIN — HYDROCODONE BITARTRATE AND ACETAMINOPHEN 1 TABLET: 5; 325 TABLET ORAL at 03:08

## 2023-07-20 RX ADMIN — IPRATROPIUM BROMIDE AND ALBUTEROL SULFATE 3 ML: 2.5; .5 SOLUTION RESPIRATORY (INHALATION) at 23:04

## 2023-07-20 RX ADMIN — METOPROLOL SUCCINATE 25 MG: 25 TABLET, EXTENDED RELEASE ORAL at 08:43

## 2023-07-20 RX ADMIN — Medication 10 ML: at 19:56

## 2023-07-20 RX ADMIN — IPRATROPIUM BROMIDE AND ALBUTEROL SULFATE 3 ML: 2.5; .5 SOLUTION RESPIRATORY (INHALATION) at 12:02

## 2023-07-20 RX ADMIN — POTASSIUM CHLORIDE AND SODIUM CHLORIDE 100 ML/HR: 900; 150 INJECTION, SOLUTION INTRAVENOUS at 12:03

## 2023-07-20 RX ADMIN — ONDANSETRON 4 MG: 2 INJECTION INTRAMUSCULAR; INTRAVENOUS at 01:37

## 2023-07-20 RX ADMIN — LISINOPRIL 20 MG: 20 TABLET ORAL at 08:42

## 2023-07-20 RX ADMIN — DOCUSATE SODIUM 50MG AND SENNOSIDES 8.6MG 2 TABLET: 8.6; 5 TABLET, FILM COATED ORAL at 22:11

## 2023-07-20 RX ADMIN — Medication 10 ML: at 08:43

## 2023-07-20 RX ADMIN — ONDANSETRON 4 MG: 2 INJECTION INTRAMUSCULAR; INTRAVENOUS at 09:16

## 2023-07-20 RX ADMIN — IPRATROPIUM BROMIDE AND ALBUTEROL SULFATE 3 ML: 2.5; .5 SOLUTION RESPIRATORY (INHALATION) at 03:40

## 2023-07-20 RX ADMIN — POLYETHYLENE GLYCOL 3350 17 G: 17 POWDER, FOR SOLUTION ORAL at 09:12

## 2023-07-20 RX ADMIN — PROMETHAZINE HYDROCHLORIDE 25 MG: 25 SUPPOSITORY RECTAL at 23:20

## 2023-07-20 RX ADMIN — METHYLPREDNISOLONE SODIUM SUCCINATE 40 MG: 40 INJECTION INTRAMUSCULAR; INTRAVENOUS at 01:37

## 2023-07-20 RX ADMIN — HYDROCODONE BITARTRATE AND ACETAMINOPHEN 1 TABLET: 5; 325 TABLET ORAL at 11:37

## 2023-07-20 RX ADMIN — Medication 10 ML: at 01:38

## 2023-07-20 RX ADMIN — POTASSIUM CHLORIDE AND SODIUM CHLORIDE 100 ML/HR: 900; 150 INJECTION, SOLUTION INTRAVENOUS at 01:38

## 2023-07-20 RX ADMIN — IPRATROPIUM BROMIDE AND ALBUTEROL SULFATE 3 ML: 2.5; .5 SOLUTION RESPIRATORY (INHALATION) at 19:13

## 2023-07-20 RX ADMIN — METHYLPREDNISOLONE SODIUM SUCCINATE 40 MG: 40 INJECTION INTRAMUSCULAR; INTRAVENOUS at 11:37

## 2023-07-20 RX ADMIN — ATORVASTATIN CALCIUM 20 MG: 20 TABLET, FILM COATED ORAL at 22:11

## 2023-07-20 RX ADMIN — ENOXAPARIN SODIUM 30 MG: 100 INJECTION SUBCUTANEOUS at 08:43

## 2023-07-20 RX ADMIN — ONDANSETRON 4 MG: 2 INJECTION INTRAMUSCULAR; INTRAVENOUS at 19:56

## 2023-07-20 RX ADMIN — METHYLPREDNISOLONE SODIUM SUCCINATE 40 MG: 40 INJECTION INTRAMUSCULAR; INTRAVENOUS at 17:45

## 2023-07-20 RX ADMIN — Medication 1 TABLET: at 08:41

## 2023-07-20 NOTE — PLAN OF CARE
Goal Outcome Evaluation:  Plan of Care Reviewed With: patient        Progress: improving  Outcome Evaluation: Patient remains alert and oriented on 1.5L n/c. Medicated with PRN pain medication per MAR. Continuous fluids maintained as ordered. No new issues at this time.

## 2023-07-20 NOTE — ED PROVIDER NOTES
Time: 10:24 PM EDT  Date of encounter:  7/19/2023  Independent Historian/Clinical History and Information was obtained by:   Patient    History is limited by: N/A    Chief Complaint: shortness of breath      History of Present Illness:  Patient is a 78 y.o. year old female who presents to the emergency department for evaluation of Shortness of breath.  Patient states she had a O2 sat in the 80s at home.  She does have a history of COPD.  At baseline she is not on supplemental oxygen.  She does report a mild cough.  She tried nebulizer treatment without any improvement.  No fever, chills, vomiting, diarrhea, abdominal pain.      HPI    Patient Care Team  Primary Care Provider: Ren Garrett DPM    Past Medical History:     No Known Allergies  Past Medical History:   Diagnosis Date    COPD (chronic obstructive pulmonary disease)     Hyperlipidemia     Hypertension      Past Surgical History:   Procedure Laterality Date    CHOLECYSTECTOMY       History reviewed. No pertinent family history.    Home Medications:  Prior to Admission medications    Medication Sig Start Date End Date Taking? Authorizing Provider   atorvastatin (LIPITOR) 40 MG tablet Take 1 tablet by mouth Daily.   Yes Paul Brown MD   metoprolol succinate XL (TOPROL-XL) 25 MG 24 hr tablet Take 1 tablet by mouth Daily. 5/19/22  Yes Paul Brown MD   acetaminophen (TYLENOL) 500 MG tablet Take 500 mg by mouth Every 6 (Six) Hours As Needed for Mild Pain .    Paul Brown MD   busPIRone (BUSPAR) 10 MG tablet Take 1 tablet by mouth 3 (Three) Times a Day. 5/19/22   Paul Brown MD   Calcium Carb-Cholecalciferol (Oyster Shell Calcium/D) 500-10 MG-MCG tablet tablet Take 1 tablet by mouth Daily.    Paul Brown MD   cyclobenzaprine (FLEXERIL) 10 MG tablet Take 1 tablet by mouth At Night As Needed. 5/5/22   Paul Brown MD   diclofenac (VOLTAREN) 50 MG EC tablet Take 1 tablet by mouth Daily As Needed (pain).  "1/13/23   Louis Fernandez MD   HYDROcodone-acetaminophen (NORCO) 5-325 MG per tablet Take 1 tablet by mouth Every 6 (Six) Hours As Needed.    ProviderPaul MD   lisinopril (PRINIVIL,ZESTRIL) 20 MG tablet Take 1 tablet by mouth Daily. 6/3/22   ProviderPaul MD        Social History:   Social History     Tobacco Use    Smoking status: Every Day     Packs/day: 0.50     Types: Cigarettes    Smokeless tobacco: Never   Substance Use Topics    Alcohol use: Yes         Review of Systems:  Review of Systems   Constitutional:  Negative for chills and fever.   HENT:  Negative for congestion, ear pain and sore throat.    Eyes:  Negative for pain.   Respiratory:  Positive for cough and shortness of breath. Negative for chest tightness.    Cardiovascular:  Negative for chest pain.   Gastrointestinal:  Negative for abdominal pain, diarrhea, nausea and vomiting.   Genitourinary:  Negative for flank pain and hematuria.   Musculoskeletal:  Negative for joint swelling.   Skin:  Negative for pallor.   Neurological:  Negative for seizures and headaches.   All other systems reviewed and are negative.     Physical Exam:  /63   Pulse 89   Temp 98.3 øF (36.8 øC) (Oral)   Resp 20   Ht 149.9 cm (59.02\")   Wt 34.1 kg (75 lb 2.8 oz)   SpO2 91%   BMI 15.18 kg/mý     Physical Exam  Constitutional:       Appearance: Normal appearance.   HENT:      Head: Normocephalic and atraumatic.      Nose: Nose normal.      Mouth/Throat:      Mouth: Mucous membranes are moist.   Eyes:      Extraocular Movements: Extraocular movements intact.      Conjunctiva/sclera: Conjunctivae normal.      Pupils: Pupils are equal, round, and reactive to light.   Cardiovascular:      Rate and Rhythm: Normal rate and regular rhythm.      Pulses: Normal pulses.      Heart sounds: Normal heart sounds.   Pulmonary:      Effort: Pulmonary effort is normal.      Breath sounds: Wheezing and rhonchi present.   Abdominal:      General: There is no " distension.      Palpations: Abdomen is soft.      Tenderness: There is no abdominal tenderness.   Musculoskeletal:         General: Normal range of motion.      Cervical back: Normal range of motion.   Skin:     General: Skin is warm and dry.      Capillary Refill: Capillary refill takes less than 2 seconds.   Neurological:      General: No focal deficit present.      Mental Status: She is alert and oriented to person, place, and time. Mental status is at baseline.   Psychiatric:         Mood and Affect: Mood normal.         Behavior: Behavior normal.                Procedures:  Procedures      Medical Decision Making:      Comorbidities that affect care:    COPD, Hypertension, Smoking    External Notes reviewed:    Previous Admission Note: Patient was at admitted on 1/9/2023 for hyperkalemia and urinary tract infection and sepsis.      The following orders were placed and all results were independently analyzed by me:  Orders Placed This Encounter   Procedures    Influenza Antigen, Rapid - Swab, Nasopharynx    COVID-19,CEPHEID/JUDY,COR/FAWAD/PAD/TODD/MAD IN-HOUSE(OR EMERGENT/ADD-ON),NP SWAB IN TRANSPORT MEDIA 3-4 HR TAT, RT-PCR - Swab, Nasopharynx    Blood Culture - Blood,    Blood Culture - Blood,    XR Chest 1 View    [place] Draw    Comprehensive Metabolic Panel    BNP    Single High Sensitivity Troponin T    CBC Auto Differential    ABG with Co-Ox and Electrolytes    Lactic Acid, Plasma    STAT Lactic Acid, Reflex    NPO Diet NPO Type: Strict NPO    Undress & Gown    Continuous Pulse Oximetry    Vital Signs    Code Status and Medical Interventions:    IP General Consult (Use specialty-specific consult if known)    Oxygen Therapy- Nasal Cannula; Titrate 1-6 LPM Per SpO2; 90 - 95%    ECG 12 Lead ED Triage Standing Order; SOA    Insert Peripheral IV    Inpatient Admission    CBC & Differential    Green Top (Gel)    Lavender Top    Gold Top - SST    Light Blue Top       Medications Given in the Emergency  Department:  Medications   sodium chloride 0.9 % flush 10 mL (has no administration in time range)   AZITHROMYCIN 500 MG/250 ML 0.9% NS IVPB (vial-mate) (has no administration in time range)   ipratropium-albuterol (DUO-NEB) nebulizer solution 3 mL (3 mL Nebulization Given 7/19/23 1926)   cefTRIAXone (ROCEPHIN) IVPB 1,000 mg (1,000 mg Intravenous New Bag 7/19/23 2237)   methylPREDNISolone sodium succinate (SOLU-Medrol) injection 125 mg (125 mg Intravenous Given 7/19/23 2235)        ED Course:    ED Course as of 07/19/23 2332   Wed Jul 19, 2023 2142 ECG 12 Lead ED Triage Standing Order; SOA  Sinus rhythm rate 91.  No acute ST elevation.  Normal ME and QTc interval.  Nonspecific T wave abnormality.  EKG interpreted by me.  No significant change when compared to previous. [LD]      ED Course User Index  [LD] Servando Max MD       Labs:    Lab Results (last 24 hours)       Procedure Component Value Units Date/Time    CBC & Differential [[id number]]  (Abnormal) Collected: 07/19/23 1855    Specimen: Blood Updated: 07/19/23 1904    Narrative:      The following orders were created for panel order CBC & Differential.  Procedure                               Abnormality         Status                     ---------                               -----------         ------                     CBC Auto Differential[[id number]]        Abnormal            Final result                 Please view results for these tests on the individual orders.    Comprehensive Metabolic Panel [[id number]]  (Abnormal) Collected: 07/19/23 1855    Specimen: Blood Updated: 07/19/23 1924     Glucose 107 mg/dL      BUN 14 mg/dL      Creatinine 0.72 mg/dL      Sodium 139 mmol/L      Potassium 4.7 mmol/L      Chloride 99 mmol/L      CO2 24.2 mmol/L      Calcium 9.8 mg/dL      Total Protein 6.7 g/dL      Albumin 3.5 g/dL      ALT (SGPT) 11 U/L      AST (SGOT) 29 U/L      Alkaline Phosphatase 193 U/L      Total Bilirubin 0.8 mg/dL      Globulin 3.2  gm/dL      A/G Ratio 1.1 g/dL      BUN/Creatinine Ratio 19.4     Anion Gap 15.8 mmol/L      eGFR 84.6 mL/min/1.73     Narrative:      GFR Normal >60  Chronic Kidney Disease <60  Kidney Failure <15    The GFR formula is only valid for adults with stable renal function between ages 18 and 70.    BNP [[id number]]  (Normal) Collected: 07/19/23 1855    Specimen: Blood Updated: 07/19/23 1921     proBNP 1,491.0 pg/mL     Narrative:      Among patients with dyspnea, NT-proBNP is highly sensitive for the detection of acute congestive heart failure. In addition NT-proBNP of <300 pg/ml effectively rules out acute congestive heart failure with 99% negative predictive value.      Single High Sensitivity Troponin T [[id number]]  (Abnormal) Collected: 07/19/23 1855    Specimen: Blood Updated: 07/19/23 1924     HS Troponin T 19 ng/L     Narrative:      High Sensitive Troponin T Reference Range:  <10.0 ng/L- Negative Female for AMI  <15.0 ng/L- Negative Male for AMI  >=10 - Abnormal Female indicating possible myocardial injury.  >=15 - Abnormal Male indicating possible myocardial injury.   Clinicians would have to utilize clinical acumen, EKG, Troponin, and serial changes to determine if it is an Acute Myocardial Infarction or myocardial injury due to an underlying chronic condition.         CBC Auto Differential [[id number]]  (Abnormal) Collected: 07/19/23 1855    Specimen: Blood Updated: 07/19/23 1904     WBC 10.09 10*3/mm3      RBC 4.43 10*6/mm3      Hemoglobin 13.8 g/dL      Hematocrit 42.1 %      MCV 95.0 fL      MCH 31.2 pg      MCHC 32.8 g/dL      RDW 16.5 %      RDW-SD 58.0 fl      MPV 9.4 fL      Platelets 270 10*3/mm3      Neutrophil % 86.8 %      Lymphocyte % 3.7 %      Monocyte % 6.6 %      Eosinophil % 2.3 %      Basophil % 0.3 %      Immature Grans % 0.3 %      Neutrophils, Absolute 8.76 10*3/mm3      Lymphocytes, Absolute 0.37 10*3/mm3      Monocytes, Absolute 0.67 10*3/mm3      Eosinophils, Absolute 0.23 10*3/mm3       Basophils, Absolute 0.03 10*3/mm3      Immature Grans, Absolute 0.03 10*3/mm3      nRBC 0.0 /100 WBC     Influenza Antigen, Rapid - Swab, Nasopharynx [[id number]]  (Normal) Collected: 07/19/23 1916    Specimen: Swab from Nasopharynx Updated: 07/19/23 1947     Influenza A Ag, EIA Negative     Influenza B Ag, EIA Negative    COVID-19,CEPHEID/JUDY,COR/FAWAD/PAD/TODD/MAD IN-HOUSE(OR EMERGENT/ADD-ON),NP SWAB IN TRANSPORT MEDIA 3-4 HR TAT, RT-PCR - Swab, Nasopharynx [[id number]]  (Normal) Collected: 07/19/23 1916    Specimen: Swab from Nasopharynx Updated: 07/19/23 2007     COVID19 Not Detected    Narrative:      Fact sheet for providers: https://www.fda.gov/media/257287/download     Fact sheet for patients: https://www.fda.gov/media/255679/download  Fact sheet for providers: https://www.fda.gov/media/488296/download     Fact sheet for patients: https://www.fda.gov/media/128647/download    ABG with Co-Ox and Electrolytes [[id number]]  (Abnormal) Collected: 07/19/23 1934    Specimen: Arterial Blood from Arm, Left Updated: 07/19/23 1936     pH, Arterial 7.449 pH units      pCO2, Arterial 33.3 mm Hg      pO2, Arterial 65.2 mm Hg      HCO3, Arterial 22.6 mmol/L      Base Excess, Arterial -0.7 mmol/L      O2 Saturation, Arterial 92.7 %      Hemoglobin, Blood Gas 13.9 g/dL      Carboxyhemoglobin 1.6 %      Methemoglobin 0.20 %      Oxyhemoglobin 91.0 %      FHHB 7.2 %      Jarett's Test N/A     Note --     Site Arterial: left brachial     Modality Cannula     FIO2 24 %      Flow Rate 1 lpm      Sodium, Arterial 135.3 mmol/L      Potassium, Arterial 3.95 mmol/L      Ionized Calcium, Arterial 1.15 mmol/L      Chloride, Arterial 101 mmol/L      Glucose, Arterial 87 mg/dL      Lactate, Arterial 2.53 mmol/L      PO2/FIO2 272    Lactic Acid, Plasma [[id number]]  (Abnormal) Collected: 07/19/23 2233    Specimen: Blood Updated: 07/19/23 2332     Lactate 2.8 mmol/L     Blood Culture - Blood, Arm, Left [[id number]] Collected: 07/19/23 2233     Specimen: Blood from Arm, Left Updated: 07/19/23 2244    Blood Culture - Blood, Arm, Left [[id number]] Collected: 07/19/23 2233    Specimen: Blood from Arm, Left Updated: 07/19/23 2244             Imaging:    XR Chest 1 View    Result Date: 7/19/2023  PROCEDURE: XR CHEST 1 VW  COMPARISON: [facility], CR, XR CHEST 1 VW, 1/09/2023, 22:18.  INDICATIONS: shortness of breath  FINDINGS:  The heart is normal in size.  7 cm consolidation at the right lung base probably represents pneumonia or aspiration.  The left lung is well-expanded and free of infiltrates.  Bony structures appear intact.        7 cm consolidation at the right lung base probably represents pneumonia or aspiration.       EDUIN VALDEZ MD       Electronically Signed and Approved By: EDUIN VALDEZ MD on 7/19/2023 at 19:37                Differential Diagnosis and Discussion:    Dyspnea: Differential diagnosis includes but is not limited to metabolic acidosis, neurological disorders, psychogenic, asthma, pneumothorax, upper airway obstruction, COPD, pneumonia, noncardiogenic pulmonary edema, interstitial lung disease, anemia, congestive heart failure, and pulmonary embolism    All labs were reviewed and interpreted by me.  All X-rays impressions were independently interpreted by me.  EKG was interpreted by me.    MDM  Number of Diagnoses or Management Options  Diagnosis management comments: Patient is afebrile and nontoxic-appearing.  Vital signs stable.  O2 sat low 90s on 1 L nasal cannula.  X-ray concerning for pneumonia.  Patient has rhonchi bilateral.  She has mild wheezes.  Discussed patient with hospitalist and will admit for further care.       Amount and/or Complexity of Data Reviewed  Clinical lab tests: reviewed  Tests in the radiology section of [facility]: reviewed  Review and summarize past medical records: yes  Independent visualization of images, tracings, or specimens: yes    Risk of Complications, Morbidity, and/or  Mortality  Presenting problems: moderate  Management options: moderate             Patient Care Considerations:    CT CHEST: I considered ordering a CT scan of the chest, however not emergently indicated. Can obtain as in patient if needed      Consultants/Shared Management Plan:    Hospitalist: I have discussed the case with Dr Garrett who agrees to accept the patient for admission.    Social Determinants of Health:    Patient is independent, reliable, and has access to care.       Disposition and Care Coordination:    Admit:   Through independent evaluation of the patient's history, physical, and imperical data, the patient meets criteria for observation/admission to the hospital.        Final diagnoses:   Pneumonia due to infectious organism, unspecified laterality, unspecified part of lung   Acute respiratory failure with hypoxia        ED Disposition       ED Disposition   Decision to Admit    Condition   --    Comment   Level of Care: Med/Surg [1]   Diagnosis: PNA (pneumonia) [[id number]]   Admitting Physician: JOZEF GARRETT [[id number]]   Certification: I Certify That Inpatient Hospital Services Are Medically Necessary For Greater Than 2 Midnights                 This medical record created using voice recognition software.             Servando Max MD  07/19/23 6552

## 2023-07-20 NOTE — H&P
[facility]   HISTORY AND PHYSICAL    Patient Name: Sandra Sylvester  : 1945  MRN: [id number]  Primary Care Physician:  René Garrett MD  Date of admission: 2023    Subjective   Subjective     Chief Complaint:   Shortness of breath      HPI:    Sandra Sylvester is a 78 y.o. female who was brought into emergency room for increasing shortness of breath.  Patient developed the symptoms 2 to 3 days ago and was supposed to see me in office.  Patient got worse and was brought into ER.  She was hypoxic with a sats of 80%.  Work-up in the ER showed large infiltrate and pneumonia.  Patient admitted to medical service.  This morning patient still short of breath, unable to speak full sentences.  Minimal cough and wheezing.  Chest wall tenderness.  No fever chills reported        Review of Systems:    Complains of tiredness and fatigue  No fever  Denies chills  Shortness of breath  Cough  Denies coughing up blood  No nausea vomiting or diarrhea      Personal History     Past Medical History:   Diagnosis Date    COPD (chronic obstructive pulmonary disease)     Hyperlipidemia     Hypertension        Past Surgical History:   Procedure Laterality Date    CHOLECYSTECTOMY         Family History: family history is not on file. Otherwise pertinent FHx was reviewed and not pertinent to current issue.    Social History:  reports that she has been smoking cigarettes. She has been smoking an average of .5 packs per day. She has never used smokeless tobacco. She reports current alcohol use. She reports that she does not use drugs.    Home Medications:  Oyster Shell Calcium/D, albuterol sulfate HFA, atorvastatin, clobetasol, metoprolol succinate XL, multivitamin with minerals, naproxen, and traZODone      Allergies:  No Known Allergies    Objective   Objective     Vitals:   Temp:  [97.7 øF (36.5 øC)-98.3 øF (36.8 øC)] 98.1 øF (36.7 øC)  Heart Rate:  [77-95] 95  Resp:  [18-20] 20  BP: ()/(57-92) 97/57  Flow  (L/min):  [1-3] 1    Physical Exam    Elderly female cachectic and thinly built.  Neck is supple.  Heart is regular.  Lungs diminished breath sounds bilaterally with crackles right greater than left.  Abdomen is soft scaphoid.  Extremities trace of edema around ankles.  Neurologically awake alert and oriented.      I have personally reviewed the results from the time of this admission to 7/20/2023 16:42 EDT and agree with these findings:  [x]  Laboratory  []  Microbiology  []  Radiology  []  EKG/Telemetry   []  Cardiology/Vascular   []  Pathology  []  Old records  []  Other:    CBC:    WBC   Date Value Ref Range Status   07/20/2023 8.93 3.40 - 10.80 10*3/mm3 Final     RBC   Date Value Ref Range Status   07/20/2023 4.12 3.77 - 5.28 10*6/mm3 Final     Hemoglobin   Date Value Ref Range Status   07/20/2023 12.8 12.0 - 15.9 g/dL Final     Hematocrit   Date Value Ref Range Status   07/20/2023 38.8 34.0 - 46.6 % Final     MCV   Date Value Ref Range Status   07/20/2023 94.2 79.0 - 97.0 fL Final     MCH   Date Value Ref Range Status   07/20/2023 31.1 26.6 - 33.0 pg Final     MCHC   Date Value Ref Range Status   07/20/2023 33.0 31.5 - 35.7 g/dL Final     RDW   Date Value Ref Range Status   07/20/2023 16.6 (H) 12.3 - 15.4 % Final     RDW-SD   Date Value Ref Range Status   07/20/2023 57.5 (H) 37.0 - 54.0 fl Final     MPV   Date Value Ref Range Status   07/20/2023 9.4 6.0 - 12.0 fL Final     Platelets   Date Value Ref Range Status   07/20/2023 234 140 - 450 10*3/mm3 Final     Neutrophil %   Date Value Ref Range Status   07/20/2023 94.8 (H) 42.7 - 76.0 % Final     Lymphocyte %   Date Value Ref Range Status   07/20/2023 2.2 (L) 19.6 - 45.3 % Final     Monocyte %   Date Value Ref Range Status   07/20/2023 1.7 (L) 5.0 - 12.0 % Final     Eosinophil %   Date Value Ref Range Status   07/20/2023 0.6 0.3 - 6.2 % Final     Basophil %   Date Value Ref Range Status   07/20/2023 0.3 0.0 - 1.5 % Final     Immature Grans %   Date Value Ref  Range Status   07/20/2023 0.4 0.0 - 0.5 % Final     Neutrophils, Absolute   Date Value Ref Range Status   07/20/2023 8.46 (H) 1.70 - 7.00 10*3/mm3 Final     Lymphocytes, Absolute   Date Value Ref Range Status   07/20/2023 0.20 (L) 0.70 - 3.10 10*3/mm3 Final     Monocytes, Absolute   Date Value Ref Range Status   07/20/2023 0.15 0.10 - 0.90 10*3/mm3 Final     Eosinophils, Absolute   Date Value Ref Range Status   07/20/2023 0.05 0.00 - 0.40 10*3/mm3 Final     Basophils, Absolute   Date Value Ref Range Status   07/20/2023 0.03 0.00 - 0.20 10*3/mm3 Final     Immature Grans, Absolute   Date Value Ref Range Status   07/20/2023 0.04 0.00 - 0.05 10*3/mm3 Final     nRBC   Date Value Ref Range Status   07/20/2023 0.0 0.0 - 0.2 /100 WBC Final        BMP:    Lab Results   Component Value Date    GLUCOSE 112 (H) 07/20/2023    BUN 14 07/20/2023    CREATININE 0.53 (L) 07/20/2023    BCR 26.4 (H) 07/20/2023    K 4.1 07/20/2023    CO2 20.2 (L) 07/20/2023    CALCIUM 9.2 07/20/2023    ALBUMIN 3.5 07/19/2023    AST 29 07/19/2023    ALT 11 07/19/2023        XR Chest 1 View    Result Date: 7/19/2023    7 cm consolidation at the right lung base probably represents pneumonia or aspiration.       EDUIN VALDEZ MD       Electronically Signed and Approved By: EDUIN VALDEZ MD on 7/19/2023 at 19:37                     Assessment & Plan   Assessment / Plan       Current Diagnosis:  Active Hospital Problems    Diagnosis     **PNA (pneumonia)     COPD with acute exacerbation     Hypoxia     Severe malnutrition      Plan:   Admit to hospital.  IV steroids.  Neb treatments.  IV antibiotics.  Pneumonia work-up including CT chest, mycoplasma IgM ordered.  Urinary studies for strep and Legionella  O2 supplementation    Further management will be based on clinical course    DVT prophylaxis:  Medical DVT prophylaxis orders are present.    GI Prophylaxis:       Pepcid    CODE STATUS:    Level Of Support Discussed With: Patient  Code Status (Patient  has no pulse and is not breathing): CPR (Attempt to Resuscitate)  Medical Interventions (Patient has pulse or is breathing): Full Support  Release to patient: Routine Release    Admission Status:  I believe this patient meets inpatient status.             I have dictated this note utilizing Dragon Dictation.             Please note that portions of this note were completed with a voice recognition program.             Part of this note may be an electronic transcription/translation of spoken language to printed text         using the Dragon Dictation System.       Electronically signed by René Garrett MD, 07/20/23, 7:23 AM EDT.    Total time spent with in evaluation and management: 37

## 2023-07-20 NOTE — CONSULTS
"Nutrition Services    Patient Name: Sandra Sylvester  YOB: 1945  MRN: [id number]  Admission date: 7/19/2023      CLINICAL NUTRITION ASSESSMENT      Reason for Assessment  Identified at risk by screening criteria, MST score 2+, BMI     H&P:    Past Medical History:   Diagnosis Date    COPD (chronic obstructive pulmonary disease)     Hyperlipidemia     Hypertension         Current Problems:   Active Hospital Problems    Diagnosis     **PNA (pneumonia)         Nutrition/Diet History         Narrative     78 year old female admitted with SOA/pneumonia.  See PMH above.    Feeds self Cardiac Diet, Regular Texture , Thin Fluids.  Consumed 50% of breakfast.      Noted with a rash to skin on lower portion of body and upper legs.  Wayne Score = 16  NFPE conducted by RD.  Pt meet criteria for Severe Malnutrition.  Pt educated regarding hypercatbolic state with COPD dx and muscle wasting/fat wasting, etc.  Pt agreeable to Boost + with each meal.     Anthropometrics        Current Height, Weight Height: 149.9 cm (59\")  Weight: 33.6 kg (74 lb 1.2 oz)   Current BMI Body mass index is 14.96 kg/mý.       Weight Hx  Wt Readings from Last 30 Encounters:   07/20/23 0112 33.6 kg (74 lb 1.2 oz)   07/19/23 1850 34.1 kg (75 lb 2.8 oz)   01/13/23 0600 38.4 kg (84 lb 10.5 oz)   01/12/23 0555 37.6 kg (82 lb 14.3 oz)   01/11/23 0625 38.4 kg (84 lb 10.5 oz)   01/10/23 0452 38.9 kg (85 lb 12.1 oz)   01/10/23 0155 38.9 kg (85 lb 12.1 oz)   01/09/23 2025 42 kg (92 lb 9.5 oz)   09/01/22 1426 40.8 kg (90 lb)   08/01/22 1431 40.8 kg (90 lb)   06/07/22 1016 40.8 kg (90 lb)   05/07/22 1924 40.9 kg (90 lb 2.7 oz)            Wt Change Observation Wt decrease of 10# (11%) in 6 months     Estimated/Assessed Needs       Energy Requirements #/48.8kg   EST Needs (kcal/day) 30-35 kcals/kg = 1464 - 1708 calories       Protein Requirements    EST Daily Needs (g/day) 1.0-1.2 g/kg = 48.8- 58 grams       Fluid Requirements     Estimated Needs " (mL/day) 30-35 ml/kg = 1464 - 1708 ml  (6-7 cups)     Labs/Medications         Pertinent Labs Reviewed.   Results from last 7 days   Lab Units 07/20/23  0146 07/19/23  1934 07/19/23  1855   SODIUM mmol/L 137  --  139   SODIUM, ARTERIAL mmol/L  --  135.3*  --    POTASSIUM mmol/L 4.1  --  4.7   CHLORIDE mmol/L 100  --  99   CO2 mmol/L 20.2*  --  24.2   BUN mg/dL 14  --  14   CREATININE mg/dL 0.53*  --  0.72   CALCIUM mg/dL 9.2  --  9.8   BILIRUBIN mg/dL  --   --  0.8   ALK PHOS U/L  --   --  193*   ALT (SGPT) U/L  --   --  11   AST (SGOT) U/L  --   --  29   GLUCOSE mg/dL 112*  --  107*   GLUCOSE, ARTERIAL mg/dL  --  87  --      Results from last 7 days   Lab Units 07/20/23  0146   HEMOGLOBIN g/dL 12.8   HEMATOCRIT % 38.8     COVID19   Date Value Ref Range Status   07/19/2023 Not Detected Not Detected - Ref. Range Final     No results found for: HGBA1C      Pertinent Medications Reviewed.     Current Nutrition Orders & Evaluation of Intake       Oral Nutrition     Current PO Diet Diet: Cardiac Diets; Healthy Heart (2-3 Na+); Texture: Regular Texture (IDDSI 7); Fluid Consistency: Thin (IDDSI 0)   Supplement No active supplement orders       Malnutrition Severity Assessment      Patient meets criteria for : Severe Malnutrition  Malnutrition Type (last 8 hours)       Malnutrition Severity Assessment       Row Name 07/20/23 1626       Malnutrition Severity Assessment    Malnutrition Type Chronic Disease - Related Malnutrition      Row Name 07/20/23 1626       Insufficient Energy Intake     Insufficient Energy Intake Findings Moderate  50 % of breakfast, C/O feels like she cannot breathe    Insufficient Energy Intake  < or equal to 50% of est. energy requirement for > or equal to 5d)      Row Name 07/20/23 1626       Unintentional Weight Loss     Unintentional Weight Loss Findings Severe  Wt decrease 11% in 6 months    Unintentional Weight Loss  Weight loss greater than 10% in six months      Row Name 07/20/23 1626        Muscle Loss    Loss of Muscle Mass Findings Severe    Yarsani Region Severe - deep hollowing/scooping, lack of muscle to touch, facial bones well defined    Clavicle Bone Region Severe - protruding prominent bone    Acromion Bone Region Severe - squared shoulders, bones, and acromion process protrusion prominent    Scapular Bone Region Severe - prominent bones, depressions easily visible between ribs, scapula, spine, shoulders    Dorsal Hand Region Severe - prominent depression    Patellar Region Severe - prominent bone, square looking, very little muscle definition    Anterior Thigh Region Severe - line/depression along thigh, obviously thin    Posterior Calf Region Severe - thin with very little definition/firmness      Row Name 07/20/23 1626       Fat Loss    Subcutaneous Fat Loss Findings Severe    Orbital Region  Severe - pronounced hollowness/depression, dark circles, loose saggy skin    Upper Arm Region Severe - mostly skin, very little space between folds, fingers touch    Thoracic & Lumbar Region Severe - ribs visible with prominent depressions, iliac crest very prominent      Row Name 07/20/23 1626       Fluid Accumulation (Edema)    Fluid Acumulation Findings Mild    Fluid Accumulation  Mild equals 1+ pitting edema  sock endentions to lower extremity      Row Name 07/20/23 1626       Declining Functional Status    Declining Functional Status Findings N/A      Row Name 07/20/23 1626       Criteria Met (Must meet criteria for severity in at least 2 of these categories: M Wasting, Fat Loss, Fluid, Secondary Signs, Wt. Status, Intake)    Patient meets criteria for  Severe Malnutrition                       Nutrition Diagnosis         Nutrition Dx Problem 1 Severe malnutrition related to inadequate energy Intake as evidenced by unintended wt change. and body composition changes.       Nutrition Intervention         Cardiac Diet, Regular Texture, Thin liquids  Boost Plus - 1 carton with each meal  +1080  calories, 42 g protein     Medical Nutrition Therapy/Nutrition Education          Learner     Readiness Patient  Acceptance     Method     Response Explanation  Verbalizes understanding     Monitor/Evaluation        Monitor Per protocol, PO intake, Supplement intake, Pertinent labs, Weight, POC/GOC       Nutrition Discharge Plan         To be determined       Electronically signed by:  Jannette Rankin RD  07/20/23 16:32 EDT

## 2023-07-20 NOTE — SIGNIFICANT NOTE
Wound Eval / Progress Noted    SYDNIE Stokes     Patient Name: Sandra Sylvester  : 1945  MRN: [id number]  Today's Date: 2023                 Admit Date: 2023    Visit Dx:    ICD-10-CM ICD-9-CM   1. Pneumonia due to infectious organism, unspecified laterality, unspecified part of lung  J18.9 486   2. Acute respiratory failure with hypoxia  J96.01 518.81         PNA (pneumonia)        Past Medical History:   Diagnosis Date    COPD (chronic obstructive pulmonary disease)     Hyperlipidemia     Hypertension         Past Surgical History:   Procedure Laterality Date    CHOLECYSTECTOMY           Physical Assessment:     23 1150   Wound 23 030 Right lower arm Skin Tear   Placement Date/Time: 23   Present on Hospital Admission: Yes  Side: Right  Orientation: lower  Location: arm  Primary Wound Type: Skin Tear   Wound Image    Dressing Appearance intact;moist drainage   Closure None   Base moist;red;bleeding   Periwound ecchymotic   Periwound Temperature warm   Periwound Skin Turgor soft   Edges open   Drainage Characteristics/Odor bleeding controlled;serosanguineous   Drainage Amount small   Care, Wound cleansed with;sterile normal saline   Dressing Care dressing applied;border dressing;silicone;petroleum-based;non-adherent   Periwound Care absorptive dressing applied   Rash Left patch   No placement date or time found.   Side: Left  Type: patch   Distribution generalized   Color color consistent with skin   Configuration/Shape asymmetric   Borders indistinct;diffuse   Characteristics flat;itching          Wound Check / Follow-up:  Patient seen today for wound consult. Patient sustained skin tear to right lower arm. She has a Level 2 skin tear with a flap that is almost approximated. Cleansed gently with NS and gauze. Blotted dry. No additional trauma from removal of tegaderm initially applied. Recommending skin tear protocol.  Patient also has some reddened areas to posterior aspect  of body that are poorly defined and flat and even with surface. Patient is complaining of itching. Will recommend diphenhydramine zinc ointment to assist with comfort.   Patient is very thin and frail.    Impression: Skin tear to left arm; Reddened flat areas with itching    Short term goals:  Regain skin integrity. Skin tear protocol, skin protection, moisture prevention, pressure reduction.    Fadumo Freeman RN    7/20/2023    15:06 EDT

## 2023-07-21 ENCOUNTER — APPOINTMENT (OUTPATIENT)
Dept: CT IMAGING | Facility: HOSPITAL | Age: 78
DRG: 193 | End: 2023-07-21
Payer: MEDICARE

## 2023-07-21 PROCEDURE — 94799 UNLISTED PULMONARY SVC/PX: CPT

## 2023-07-21 PROCEDURE — 97166 OT EVAL MOD COMPLEX 45 MIN: CPT

## 2023-07-21 PROCEDURE — 25010000002 AZITHROMYCIN PER 500 MG: Performed by: INTERNAL MEDICINE

## 2023-07-21 PROCEDURE — 25010000002 ENOXAPARIN PER 10 MG: Performed by: INTERNAL MEDICINE

## 2023-07-21 PROCEDURE — 25010000002 METHYLPREDNISOLONE PER 40 MG: Performed by: INTERNAL MEDICINE

## 2023-07-21 PROCEDURE — 25010000002 ONDANSETRON PER 1 MG: Performed by: INTERNAL MEDICINE

## 2023-07-21 PROCEDURE — 25010000002 CEFTRIAXONE PER 250 MG: Performed by: INTERNAL MEDICINE

## 2023-07-21 PROCEDURE — 94664 DEMO&/EVAL PT USE INHALER: CPT

## 2023-07-21 PROCEDURE — 71250 CT THORAX DX C-: CPT

## 2023-07-21 PROCEDURE — 97161 PT EVAL LOW COMPLEX 20 MIN: CPT

## 2023-07-21 RX ORDER — AMOXICILLIN 250 MG
2 CAPSULE ORAL 2 TIMES DAILY
Status: DISCONTINUED | OUTPATIENT
Start: 2023-07-21 | End: 2023-08-01 | Stop reason: HOSPADM

## 2023-07-21 RX ORDER — CEFTRIAXONE SODIUM 1 G/50ML
1000 INJECTION, SOLUTION INTRAVENOUS
Status: COMPLETED | OUTPATIENT
Start: 2023-07-21 | End: 2023-07-27

## 2023-07-21 RX ADMIN — Medication 10 ML: at 10:22

## 2023-07-21 RX ADMIN — ONDANSETRON 4 MG: 2 INJECTION INTRAMUSCULAR; INTRAVENOUS at 14:52

## 2023-07-21 RX ADMIN — ATORVASTATIN CALCIUM 20 MG: 20 TABLET, FILM COATED ORAL at 20:52

## 2023-07-21 RX ADMIN — IPRATROPIUM BROMIDE AND ALBUTEROL SULFATE 3 ML: 2.5; .5 SOLUTION RESPIRATORY (INHALATION) at 06:30

## 2023-07-21 RX ADMIN — METHYLPREDNISOLONE SODIUM SUCCINATE 40 MG: 40 INJECTION INTRAMUSCULAR; INTRAVENOUS at 10:45

## 2023-07-21 RX ADMIN — CEFTRIAXONE SODIUM 1000 MG: 1 INJECTION, SOLUTION INTRAVENOUS at 10:22

## 2023-07-21 RX ADMIN — LISINOPRIL 20 MG: 20 TABLET ORAL at 10:21

## 2023-07-21 RX ADMIN — Medication 10 ML: at 20:52

## 2023-07-21 RX ADMIN — AZITHROMYCIN 500 MG: 500 INJECTION, POWDER, LYOPHILIZED, FOR SOLUTION INTRAVENOUS at 10:45

## 2023-07-21 RX ADMIN — METOPROLOL SUCCINATE 25 MG: 25 TABLET, EXTENDED RELEASE ORAL at 10:21

## 2023-07-21 RX ADMIN — IPRATROPIUM BROMIDE AND ALBUTEROL SULFATE 3 ML: 2.5; .5 SOLUTION RESPIRATORY (INHALATION) at 02:57

## 2023-07-21 RX ADMIN — SENNOSIDES AND DOCUSATE SODIUM 2 TABLET: 50; 8.6 TABLET ORAL at 20:52

## 2023-07-21 RX ADMIN — METHYLPREDNISOLONE SODIUM SUCCINATE 40 MG: 40 INJECTION INTRAMUSCULAR; INTRAVENOUS at 03:20

## 2023-07-21 RX ADMIN — FAMOTIDINE 20 MG: 20 TABLET ORAL at 10:21

## 2023-07-21 RX ADMIN — ONDANSETRON 4 MG: 2 INJECTION INTRAMUSCULAR; INTRAVENOUS at 22:18

## 2023-07-21 RX ADMIN — HYDROCODONE BITARTRATE AND ACETAMINOPHEN 1 TABLET: 5; 325 TABLET ORAL at 04:38

## 2023-07-21 RX ADMIN — SENNOSIDES AND DOCUSATE SODIUM 2 TABLET: 50; 8.6 TABLET ORAL at 10:21

## 2023-07-21 RX ADMIN — IPRATROPIUM BROMIDE AND ALBUTEROL SULFATE 3 ML: 2.5; .5 SOLUTION RESPIRATORY (INHALATION) at 10:46

## 2023-07-21 RX ADMIN — IPRATROPIUM BROMIDE AND ALBUTEROL SULFATE 3 ML: 2.5; .5 SOLUTION RESPIRATORY (INHALATION) at 19:13

## 2023-07-21 RX ADMIN — METHYLPREDNISOLONE SODIUM SUCCINATE 40 MG: 40 INJECTION INTRAMUSCULAR; INTRAVENOUS at 17:50

## 2023-07-21 RX ADMIN — Medication 1 TABLET: at 10:21

## 2023-07-21 RX ADMIN — IPRATROPIUM BROMIDE AND ALBUTEROL SULFATE 3 ML: 2.5; .5 SOLUTION RESPIRATORY (INHALATION) at 23:24

## 2023-07-21 RX ADMIN — HYDROCODONE BITARTRATE AND ACETAMINOPHEN 1 TABLET: 5; 325 TABLET ORAL at 14:53

## 2023-07-21 RX ADMIN — ENOXAPARIN SODIUM 30 MG: 100 INJECTION SUBCUTANEOUS at 10:21

## 2023-07-21 RX ADMIN — IPRATROPIUM BROMIDE AND ALBUTEROL SULFATE 3 ML: 2.5; .5 SOLUTION RESPIRATORY (INHALATION) at 15:06

## 2023-07-21 NOTE — PLAN OF CARE
Goal Outcome Evaluation:  Plan of Care Reviewed With: patient        Progress: improving  Outcome Evaluation: Patient remains alert and oriented x4. Medicated with PRN pain and nausea medication per MAR. Patient remains on 2L n/c. No new issues at this time.

## 2023-07-21 NOTE — PLAN OF CARE
Goal Outcome Evaluation:  Plan of Care Reviewed With: patient        Progress: no change (first session: evaluation)  Outcome Evaluation: Patient presents with limitations of stength, balance and endurance/activity tolerance which are impacting ADL/transfer independence. Skilled OT is indicated to remediate/compensate for deficits to maximize independence and safety with functional tasks.      Anticipated Discharge Disposition (OT): skilled nursing facility

## 2023-07-21 NOTE — THERAPY EVALUATION
Acute Care - Physical Therapy Initial Evaluation   Kike     Patient Name: Sandra Sylvester  : 1945  MRN: [id number]  Today's Date: 2023      Visit Dx:     ICD-10-CM ICD-9-CM   1. Pneumonia due to infectious organism, unspecified laterality, unspecified part of lung  J18.9 486   2. Acute respiratory failure with hypoxia  J96.01 518.81   3. Decreased activities of daily living (ADL)  Z78.9 V49.89   4. Difficulty walking  R26.2 719.7     Patient Active Problem List   Diagnosis    Closed fracture of right hip    Closed nondisplaced fracture of acetabulum    HTN (hypertension)    Malnourished    BMI less than 19,adult    Severe malnutrition    PNA (pneumonia)    COPD with acute exacerbation    Hypoxia     Past Medical History:   Diagnosis Date    COPD (chronic obstructive pulmonary disease)     Hyperlipidemia     Hypertension      Past Surgical History:   Procedure Laterality Date    CHOLECYSTECTOMY       PT Assessment (last 12 hours)       PT Evaluation and Treatment       Row Name 23 1506          Physical Therapy Time and Intention    Subjective Information complains of;fatigue;dyspnea  -CS     Document Type evaluation  -CS     Mode of Treatment individual therapy;physical therapy  -CS     Patient Effort adequate  -CS     Symptoms Noted During/After Treatment fatigue;shortness of breath  -CS       Row Name 23 1508          General Information    Patient Profile Reviewed yes  -CS     Patient Observations alert;cooperative;agree to therapy  -CS     Prior Level of Function independent:;all household mobility;gait;transfer;bed mobility;ADL's  Daughter assisted with any needs  -CS     Equipment Currently Used at Home cane, straight;walker, rolling;bath bench  -CS     Existing Precautions/Restrictions fall;oxygen therapy device and L/min  1.5L  -CS     Barriers to Rehab none identified  -CS       Row Name 23 1508          Living Environment    Current Living Arrangements home  -CS     Home  Accessibility stairs to enter home;stairs within home  -CS     People in Home child(levi), adult  Patient has been living with her daughter since January this year.  -CS     Primary Care Provided by self;child(levi)  -CS       Row Name 07/21/23 1506          Home Main Entrance    Number of Stairs, Main Entrance three  -CS     Stair Railings, Main Entrance railings safe and in good condition  -CS       Row Name 07/21/23 1506          Stairs Within Home, Primary    Number of Stairs, Within Home, Primary none  -CS       Row Name 07/21/23 1506          Pain    Pretreatment Pain Rating 2/10  -CS     Posttreatment Pain Rating 2/10  -CS     Pain Location - Side/Orientation Right  -CS     Pain Location - flank  -CS     Pain Intervention(s) Repositioned  -CS       Row Name 07/21/23 1506          Cognition    Orientation Status (Cognition) oriented x 3  -CS       Row Name 07/21/23 1506          Range of Motion Comprehensive    General Range of Motion bilateral lower extremity ROM WFL  -CS       Row Name 07/21/23 1506          Strength Comprehensive (MMT)    General Manual Muscle Testing (MMT) Assessment lower extremity strength deficits identified  -       Row Name 07/21/23 1506          Bed Mobility    Bed Mobility bed mobility (all) activities  -     All Activities, [place] (Bed Mobility) verbal cues;contact guard;minimum assist (75% patient effort)  -CS     Bed Mobility, Safety Issues decreased use of legs for bridging/pushing;decreased use of arms for pushing/pulling  -     Assistive Device (Bed Mobility) bed rails;head of bed elevated  -     Comment, (Bed Mobility) Patient not feeling well and declined further transfers OOB or ambulation.  -       Row Name 07/21/23 1506          Gait/Stairs (Locomotion)    Comment, (Gait/Stairs) Patient has been ambulating to/from the bathroom in the room with CGA/Blanca using STC  -CS       Row Name 07/21/23 1506          Safety Issues, Functional Mobility    Impairments  Affecting Function (Mobility) balance;strength;endurance/activity tolerance  -CS       Row Name             Wound 07/20/23 0300 Right lower arm Skin Tear    Wound - Properties Group Placement Date: 07/20/23  -VS Placement Time: 0300  -VS Present on Hospital Admission: Y  -VS Side: Right  -VS Orientation: lower  -VS Location: arm  -VS Primary Wound Type: Skin tear  -VS    Retired Wound - Properties Group Placement Date: 07/20/23  -VS Placement Time: 0300  -VS Present on Hospital Admission: Y  -VS Side: Right  -VS Orientation: lower  -VS Location: arm  -VS Primary Wound Type: Skin tear  -VS    Retired Wound - Properties Group Date first assessed: 07/20/23  -VS Time first assessed: 0300  -VS Present on Hospital Admission: Y  -VS Side: Right  -VS Location: arm  -VS Primary Wound Type: Skin tear  -VS      Row Name 07/21/23 1506          Plan of Care Review    Plan of Care Reviewed With patient  -CS     Progress no change  -CS     Outcome Evaluation Pt presents with limitations that impede their ability to safely and independently transfer and ambulate. The skills of a therapist will be required to safely and effectively implement the following treatment plan to restore maximal level of function.  -CS       Row Name 07/21/23 1506          Therapy Assessment/Plan (PT)    Rehab Potential (PT) good, to achieve stated therapy goals  -CS     Criteria for Skilled Interventions Met (PT) yes;skilled treatment is necessary  -CS     Therapy Frequency (PT) daily  -CS     Predicted Duration of Therapy Intervention (PT) 10 days  -CS     Problem List (PT) problems related to;balance;mobility;strength;pain  -CS     Activity Limitations Related to Problem List (PT) unable to ambulate safely;unable to transfer safely  -CS       Row Name 07/21/23 1506          PT Evaluation Complexity    History, PT Evaluation Complexity no personal factors and/or comorbidities  -CS     Examination of Body Systems (PT Eval Complexity) total of 4 or more  elements  -CS     Clinical Presentation (PT Evaluation Complexity) stable  -CS     Clinical Decision Making (PT Evaluation Complexity) low complexity  -CS     Overall Complexity (PT Evaluation Complexity) low complexity  -CS       Row Name 07/21/23 1506          Therapy Plan Review/Discharge Plan (PT)    Therapy Plan Review (PT) evaluation/treatment results reviewed;patient  -CS       Row Name 07/21/23 1506          Physical Therapy Goals    Bed Mobility Goal Selection (PT) bed mobility, PT goal 1  -CS     Transfer Goal Selection (PT) transfer, PT goal 1  -CS     Gait Training Goal Selection (PT) gait training, PT goal 1  -CS       Row Name 07/21/23 1506          Bed Mobility Goal 1 (PT)    Activity/Assistive Device (Bed Mobility Goal 1, PT) bed mobility activities, all  -CS     [place] Level/Cues Needed (Bed Mobility Goal 1, PT) modified independence  -CS     Time Frame (Bed Mobility Goal 1, PT) long term goal (LTG);10 days  -CS       Row Name 07/21/23 1506          Transfer Goal 1 (PT)    Activity/Assistive Device (Transfer Goal 1, PT) sit-to-stand/stand-to-sit;bed-to-chair/chair-to-bed;walker, rolling  -CS     [place] Level/Cues Needed (Transfer Goal 1, PT) supervision required  -CS     Time Frame (Transfer Goal 1, PT) long term goal (LTG);10 days  -CS       Row Name 07/21/23 1506          Gait Training Goal 1 (PT)    Activity/Assistive Device (Gait Training Goal 1, PT) gait (walking locomotion);assistive device use;walker, rolling  -CS     [place] Level (Gait Training Goal 1, PT) standby assist  -CS     Distance (Gait Training Goal 1, PT) 150  -CS     Time Frame (Gait Training Goal 1, PT) long term goal (LTG);10 days  -CS               User Key  (r) = Recorded By, (t) = Taken By, (c) = Cosigned By      Initials Name Provider Type    CS Rianna Lopez, MEREDITH Physical Therapist    VS Radha Marinelli, RN Registered Nurse                    Physical Therapy Education       Title: PT OT SLP Therapies  (In Progress)       Topic: Physical Therapy (In Progress)       Point: Mobility training (In Progress)       Learning Progress Summary             Patient Acceptance, E, NR by  at 7/21/2023 1542                         Point: Home exercise program (Not Started)       Learner Progress:  Not documented in this visit.              Point: Body mechanics (Not Started)       Learner Progress:  Not documented in this visit.              Point: Precautions (In Progress)       Learning Progress Summary             Patient Acceptance, E, NR by  at 7/21/2023 1542                                         User Key       Initials Effective Dates Name Provider Type Discipline     04/25/21 -  Rianna Lopez, PT Physical Therapist PT                  PT Recommendation and Plan  Anticipated Discharge Disposition (PT): skilled nursing facility  Planned Therapy Interventions (PT): balance training, bed mobility training, gait training, transfer training, strengthening  Therapy Frequency (PT): daily  Plan of Care Reviewed With: patient  Progress: no change  Outcome Evaluation: Pt presents with limitations that impede their ability to safely and independently transfer and ambulate. The skills of a therapist will be required to safely and effectively implement the following treatment plan to restore maximal level of function.   Outcome Measures       Row Name 07/21/23 1500             How much help from another person do you currently need...    Turning from your back to your side while in flat bed without using bedrails? 3  -CS      Moving from lying on back to sitting on the side of a flat bed without bedrails? 3  -CS      Moving to and from a bed to a chair (including a wheelchair)? 3  -CS      Standing up from a chair using your arms (e.g., wheelchair, bedside chair)? 3  -CS      Climbing 3-5 steps with a railing? 2  -CS      To walk in hospital room? 3  -CS      AM-PAC 6 Clicks Score (PT) 17  -CS         Functional Assessment     Outcome Measure Options AM-PAC 6 Clicks Basic Mobility (PT)  -CS                User Key  (r) = Recorded By, (t) = Taken By, (c) = Cosigned By      Initials Name Provider Type    Rianna Menard, PT Physical Therapist                     Time Calculation:    PT Charges       Row Name 07/21/23 1509             Time Calculation    PT Received On 07/21/23  -CS      PT Goal Re-Cert Due Date 07/30/23  -CS         Untimed Charges    PT Eval/Re-eval Minutes 35  -CS         Total Minutes    Untimed Charges Total Minutes 35  -CS       Total Minutes 35  -CS                User Key  (r) = Recorded By, (t) = Taken By, (c) = Cosigned By      Initials Name Provider Type    Rianna Menard, PT Physical Therapist                  Therapy Charges for Today       Code Description Service Date Service Provider Modifiers Qty    37951969787 HC PT EVAL LOW COMPLEXITY 3 7/21/2023 Rianna Lopez, PT GP 1            PT G-Codes  Outcome Measure Options: AM-PAC 6 Clicks Basic Mobility (PT)  AM-PAC 6 Clicks Score (PT): 17  AM-PAC 6 Clicks Score (OT): 21    Rianna Lopez PT  7/21/2023

## 2023-07-21 NOTE — PROGRESS NOTES
[facility]     Progress Note    Patient Name: Sandra Sylvester  : 1945  MRN: [id number]  Primary Care Physician:  René Garrett MD  Date of admission: 2023      Subjective   Brief summary.  Patient admitted with pneumonia and hypoxia      HPI:  Still feeling bad, hurts in the back.  Short of breath.  Oxygen running low.  No fever.  Also complains of nausea and constipation.    Review of Systems     No fever chills.  Shortness of breath, abdominal discomfort, chest wall pain in the back pain  Complains of constipation      Objective     Vitals:   Temp:  [97.9 øF (36.6 øC)-99.1 øF (37.3 øC)] 98.4 øF (36.9 øC)  Heart Rate:  [] 89  Resp:  [16-20] 16  BP: ()/(47-78) 110/47  Flow (L/min):  [1-2] 1.5    Physical Exam :     Elderly cachectic female not in acute distress but tired looking.  Heart regular.  Lungs diminished breath sounds bilaterally.  Abdomen soft.  Mild epigastric tenderness.  Extremities free of any edema      Result Review:  I have personally reviewed the results from the time of this admission to 2023 09:47 EDT and agree with these findings:  [x]  Laboratory  []  Microbiology  [x]  Radiology  []  EKG/Telemetry   []  Cardiology/Vascular   []  Pathology  []  Old records  []  Other:           Assessment / Plan       Active Hospital Problems:  Active Hospital Problems    Diagnosis     **PNA (pneumonia)     COPD with acute exacerbation     Hypoxia     Severe malnutrition        Plan:   CT chest ordered to rule out extent of pneumonia  Continue nebs and antibiotics as well as steroids  Zofran as needed for nausea along with Pepcid, for constipation we will add Linzess.  Increase activity with PT OT.       DVT prophylaxis:  Medical DVT prophylaxis orders are present.    CODE STATUS:   Level Of Support Discussed With: Patient  Code Status (Patient has no pulse and is not breathing): CPR (Attempt to Resuscitate)  Medical Interventions (Patient has pulse or is breathing): Full  Support  Release to patient: Routine Release            Electronically signed by René Garrett MD, 07/21/23, 9:47 AM EDT.      Addendum    [employer] formulary does not contain Linzess, patient cannot be given anything similar.  We will try Senokot.    Electronically signed by René Garrett MD, 07/21/23, 9:53 AM EDT.

## 2023-07-21 NOTE — PLAN OF CARE
Goal Outcome Evaluation:  Plan of Care Reviewed With: patient        Progress: no change  Outcome Evaluation: Pt presents with limitations that impede their ability to safely and independently transfer and ambulate. The skills of a therapist will be required to safely and effectively implement the following treatment plan to restore maximal level of function.      Anticipated Discharge Disposition (PT): skilled nursing facility

## 2023-07-21 NOTE — THERAPY EVALUATION
Patient Name: Sandra Sylvester  : 1945    MRN: [id number]                              Today's Date: 2023       Admit Date: 2023    Visit Dx:     ICD-10-CM ICD-9-CM   1. Pneumonia due to infectious organism, unspecified laterality, unspecified part of lung  J18.9 486   2. Acute respiratory failure with hypoxia  J96.01 518.81   3. Decreased activities of daily living (ADL)  Z78.9 V49.89     Patient Active Problem List   Diagnosis    Closed fracture of right hip    Closed nondisplaced fracture of acetabulum    HTN (hypertension)    Malnourished    BMI less than 19,adult    Severe malnutrition    PNA (pneumonia)    COPD with acute exacerbation    Hypoxia     Past Medical History:   Diagnosis Date    COPD (chronic obstructive pulmonary disease)     Hyperlipidemia     Hypertension      Past Surgical History:   Procedure Laterality Date    CHOLECYSTECTOMY        General Information       Row Name 23 1018          OT Time and Intention    Document Type evaluation  -AV     Mode of Treatment individual therapy;occupational therapy  -AV       Row Name 23 1018          General Information    Patient Profile Reviewed yes  -AV     Prior Level of Function independent:;ADL's;all household mobility;transfer  stands at sink to groom. uses tub chair and standard commode. ambulates with STC. no home O2.  -AV     Existing Precautions/Restrictions oxygen therapy device and L/min  impaired skin integrity  -AV     Barriers to Rehab none identified  -AV       Row Name 23 1018          Occupational Profile    Reason for Services/Referral (Occupational Profile) Patient is an 78 year old female admitted to [facility] on 23 with shortness of air. She is currently on 4NT/ 1.5L O2. OT consulted due to recent decline in ADL/transfer independence. No previous OT services for current condition.  -AV       Row Name 23 1018          Living Environment    People in Home alone  family lives next  door  -AV       Row Name 07/21/23 1018          Home Main Entrance    Number of Stairs, Main Entrance three  -AV       Row Name 07/21/23 1018          Stairs Within Home, Primary    Number of Stairs, Within Home, Primary none  -AV       Row Name 07/21/23 1018          Cognition    Orientation Status (Cognition) --  alert, pleasant and cooperative. able to retain information and follow commands.  -AV       Row Name 07/21/23 1018          Safety Issues, Functional Mobility    Impairments Affecting Function (Mobility) balance;strength;endurance/activity tolerance  -AV               User Key  (r) = Recorded By, (t) = Taken By, (c) = Cosigned By      Initials Name Provider Type    Toñito Gant OT Occupational Therapist                     Mobility/ADL's       Row Name 07/21/23 1021          Transfers    Comment, (Transfers) CGA  -AV       Row Name 07/21/23 1021          Activities of Daily Living    BADL Assessment/Intervention --  Independent feeding and grooming with setup in bed. CGA/min assist bathing and dressing. CGA toilet hygiene (ambulates to bathroom vs BSC).  -AV               User Key  (r) = Recorded By, (t) = Taken By, (c) = Cosigned By      Initials Name Provider Type    Toñito Gant OT Occupational Therapist                   Obj/Interventions       Row Name 07/21/23 1026          Sensory Assessment (Somatosensory)    Sensory Assessment (Somatosensory) UE sensation intact  -AV       Row Name 07/21/23 1026          Vision Assessment/Intervention    Visual Impairment/Limitations WFL;corrective lenses for reading  -AV       Row Name 07/21/23 1026          Range of Motion Comprehensive    General Range of Motion bilateral upper extremity ROM WFL  -AV     Comment, General Range of Motion AROM  -AV       Row Name 07/21/23 1026          Strength Comprehensive (MMT)    Comment, General Manual Muscle Testing (MMT) Assessment 4(-)/5 bilateral upper extremities  -AV       Row Name 07/21/23 1026           Motor Skills    Motor Skills coordination;functional endurance  -AV     Coordination WFL  right dominant  -AV     Functional Endurance fair minus  -AV       Row Name 07/21/23 1026          Balance    Comment, Balance CGA  -AV               User Key  (r) = Recorded By, (t) = Taken By, (c) = Cosigned By      Initials Name Provider Type    Toñito Gant OT Occupational Therapist                   Goals/Plan       Row Name 07/21/23 1030          Transfer Goal 1 (OT)    Activity/Assistive Device (Transfer Goal 1, OT) transfers, all  -AV     [place] Level/Cues Needed (Transfer Goal 1, OT) modified independence  -AV     Time Frame (Transfer Goal 1, OT) long term goal (LTG);10 days  -AV       Row Name 07/21/23 1030          Bathing Goal 1 (OT)    Activity/Device (Bathing Goal 1, OT) bathing skills, all;tub bench  -AV     [place] Level/Cues Needed (Bathing Goal 1, OT) modified independence  -AV     Time Frame (Bathing Goal 1, OT) long term goal (LTG);10 days  -AV       Row Name 07/21/23 1030          Dressing Goal 1 (OT)    Activity/Device (Dressing Goal 1, OT) dressing skills, all  -AV     [place]/Cues Needed (Dressing Goal 1, OT) modified independence  -AV     Time Frame (Dressing Goal 1, OT) long term goal (LTG);10 days  -AV       Row Name 07/21/23 1030          Toileting Goal 1 (OT)    Activity/Device (Toileting Goal 1, OT) toileting skills, all;raised toilet seat  -AV     [place] Level/Cues Needed (Toileting Goal 1, OT) modified independence  -AV     Time Frame (Toileting Goal 1, OT) long term goal (LTG);10 days  -AV       Row Name 07/21/23 1030          Grooming Goal 1 (OT)    Activity/Device (Grooming Goal 1, OT) grooming skills, all  -AV     [place] (Grooming Goal 1, OT) modified independence  standing at sink  -AV     Time Frame (Grooming Goal 1, OT) long term goal (LTG);10 days  -AV       Row Name 07/21/23 1030          Strength Goal 1 (OT)    Strength Goal 1 (OT) Patient will  demonstrate 4/5 bilateral upper extremities to increase ADL/transfer independence.  -AV     Time Frame (Strength Goal 1, OT) long term goal (LTG);10 days  -AV       Row Name 07/21/23 1030          Problem Specific Goal 1 (OT)    Problem Specific Goal 1 (OT) Patient will demonstrate fair endurance/activity tolerance needed to support ADLs.  -AV     Time Frame (Problem Specific Goal 1, OT) long term goal (LTG);10 days  -AV       Row Name 07/21/23 1030          Therapy Assessment/Plan (OT)    Planned Therapy Interventions (OT) activity tolerance training;BADL retraining;functional balance retraining;patient/caregiver education/training;strengthening exercise;transfer/mobility retraining;occupation/activity based interventions  -AV               User Key  (r) = Recorded By, (t) = Taken By, (c) = Cosigned By      Initials Name Provider Type    Toñito Gant, OT Occupational Therapist                   Clinical Impression       Row Name 07/21/23 1027          Pain Assessment    Additional Documentation Pain Scale: FACES Pre/Post-Treatment (Group)  -AV       Row Name 07/21/23 1027          Pain Scale: FACES Pre/Post-Treatment    Pain: FACES Scale, Pretreatment 0-->no hurt  -AV     Posttreatment Pain Rating 0-->no hurt  -AV       Row Name 07/21/23 1027          Plan of Care Review    Plan of Care Reviewed With patient  -AV     Progress no change  first session: evaluation  -AV     Outcome Evaluation Patient presents with limitations of stength, balance and endurance/activity tolerance which are impacting ADL/transfer independence. Skilled OT is indicated to remediate/compensate for deficits to maximize independence and safety with functional tasks.  -AV       Row Name 07/21/23 1027          Therapy Assessment/Plan (OT)    Patient/Family Therapy Goal Statement (OT) regain independence  -AV     Rehab Potential (OT) good, to achieve stated therapy goals  -AV     Criteria for Skilled Therapeutic Interventions Met (OT)  yes;meets criteria;skilled treatment is necessary  -AV     Therapy Frequency (OT) 5 times/wk  -AV       Row Name 07/21/23 1027          Therapy Plan Review/Discharge Plan (OT)    Equipment Needs Upon Discharge (OT) commode chair  -AV     Anticipated Discharge Disposition (OT) skilled nursing facility  -AV       Row Name 07/21/23 1027          Vital Signs    O2 Delivery Pre Treatment nasal cannula  1.5  -AV     O2 Delivery Intra Treatment nasal cannula  1.5  -AV     O2 Delivery Post Treatment nasal cannula  1.5  -AV               User Key  (r) = Recorded By, (t) = Taken By, (c) = Cosigned By      Initials Name Provider Type    Toñito Gant OT Occupational Therapist                   Outcome Measures       Row Name 07/21/23 1032          How much help from another is currently needed...    Putting on and taking off regular lower body clothing? 3  -AV     Bathing (including washing, rinsing, and drying) 3  -AV     Toileting (which includes using toilet bed pan or urinal) 3  -AV     Putting on and taking off regular upper body clothing 4  -AV     Taking care of personal grooming (such as brushing teeth) 4  -AV     Eating meals 4  -AV     AM-PAC 6 Clicks Score (OT) 21  -AV       Row Name 07/21/23 1032          Functional Assessment    Outcome Measure Options AM-PAC 6 Clicks Daily Activity (OT);Optimal Instrument  -AV       Row Name 07/21/23 1032          Optimal Instrument    Optimal Instrument Optimal - 3  -AV     Bending/Stooping 1  -AV     Standing 2  -AV     Reaching 1  -AV     From the list, choose the 3 activities you would most like to be able to do without any difficulty Bending/stooping;Standing;Reaching  -AV     Total Score Optimal - 3 4  -AV               User Key  (r) = Recorded By, (t) = Taken By, (c) = Cosigned By      Initials Name Provider Type    Toñito Gant OT Occupational Therapist                    Occupational Therapy Education       Title: PT OT SLP Therapies (In Progress)       Topic:  Occupational Therapy (In Progress)       Point: ADL training (Done)       Description:   Instruct learner(s) on proper safety adaptation and remediation techniques during self care or transfers.   Instruct in proper use of assistive devices.                  Learning Progress Summary             Patient Acceptance, E, VU by ELAINE at 7/21/2023 1032                         Point: Home exercise program (Not Started)       Description:   Instruct learner(s) on appropriate technique for monitoring, assisting and/or progressing therapeutic exercises/activities.                  Learner Progress:  Not documented in this visit.              Point: Precautions (Not Started)       Description:   Instruct learner(s) on prescribed precautions during self-care and functional transfers.                  Learner Progress:  Not documented in this visit.              Point: Body mechanics (Not Started)       Description:   Instruct learner(s) on proper positioning and spine alignment during self-care, functional mobility activities and/or exercises.                  Learner Progress:  Not documented in this visit.                              User Key       Initials Effective Dates Name Provider Type Discipline    ELAINE 06/16/21 -  Toñito Cardenas OT Occupational Therapist OT                  OT Recommendation and Plan  Planned Therapy Interventions (OT): activity tolerance training, BADL retraining, functional balance retraining, patient/caregiver education/training, strengthening exercise, transfer/mobility retraining, occupation/activity based interventions  Therapy Frequency (OT): 5 times/wk  Plan of Care Review  Plan of Care Reviewed With: patient  Progress: no change (first session: evaluation)  Outcome Evaluation: Patient presents with limitations of stength, balance and endurance/activity tolerance which are impacting ADL/transfer independence. Skilled OT is indicated to remediate/compensate for deficits to maximize independence  and safety with functional tasks.     Time Calculation:   Evaluation Complexity (OT)  Review Occupational Profile/Medical/Therapy History Complexity: expanded/moderate complexity  Assessment, Occupational Performance/Identification of Deficit Complexity: 3-5 performance deficits  Clinical Decision Making Complexity (OT): detailed assessment/moderate complexity  Overall Complexity of Evaluation (OT): moderate complexity     Time Calculation- OT       Row Name 07/21/23 1033             Time Calculation- OT    OT Received On 07/21/23  -AV      OT Goal Re-Cert Due Date 07/30/23  -AV         Untimed Charges    OT Eval/Re-eval Minutes 35  -AV         Total Minutes    Untimed Charges Total Minutes 35  -AV       Total Minutes 35  -AV                User Key  (r) = Recorded By, (t) = Taken By, (c) = Cosigned By      Initials Name Provider Type    AV Toñito Cardenas OT Occupational Therapist                  Therapy Charges for Today       Code Description Service Date Service Provider Modifiers Qty    [id number] HC OT EVAL MOD COMPLEXITY 3 7/21/2023 Toñito Cardenas OT GO 1                 Toñito Cardenas OT  7/21/2023

## 2023-07-22 LAB
ALBUMIN SERPL-MCNC: 2.9 G/DL (ref 3.5–5.2)
ALBUMIN/GLOB SERPL: 1.2 G/DL
ALP SERPL-CCNC: 106 U/L (ref 39–117)
ALT SERPL W P-5'-P-CCNC: 11 U/L (ref 1–33)
ANION GAP SERPL CALCULATED.3IONS-SCNC: 7.4 MMOL/L (ref 5–15)
AST SERPL-CCNC: 20 U/L (ref 1–32)
BASOPHILS # BLD AUTO: 0.01 10*3/MM3 (ref 0–0.2)
BASOPHILS NFR BLD AUTO: 0.1 % (ref 0–1.5)
BILIRUB SERPL-MCNC: 0.2 MG/DL (ref 0–1.2)
BUN SERPL-MCNC: 12 MG/DL (ref 8–23)
BUN/CREAT SERPL: 27.3 (ref 7–25)
CALCIUM SPEC-SCNC: 9.2 MG/DL (ref 8.6–10.5)
CHLORIDE SERPL-SCNC: 106 MMOL/L (ref 98–107)
CO2 SERPL-SCNC: 23.6 MMOL/L (ref 22–29)
CREAT SERPL-MCNC: 0.44 MG/DL (ref 0.57–1)
DEPRECATED RDW RBC AUTO: 62.4 FL (ref 37–54)
EGFRCR SERPLBLD CKD-EPI 2021: 97.9 ML/MIN/1.73
EOSINOPHIL # BLD AUTO: 0 10*3/MM3 (ref 0–0.4)
EOSINOPHIL NFR BLD AUTO: 0 % (ref 0.3–6.2)
ERYTHROCYTE [DISTWIDTH] IN BLOOD BY AUTOMATED COUNT: 17.3 % (ref 12.3–15.4)
GLOBULIN UR ELPH-MCNC: 2.4 GM/DL
GLUCOSE SERPL-MCNC: 135 MG/DL (ref 65–99)
HCT VFR BLD AUTO: 34.3 % (ref 34–46.6)
HGB BLD-MCNC: 10.7 G/DL (ref 12–15.9)
IMM GRANULOCYTES # BLD AUTO: 0.11 10*3/MM3 (ref 0–0.05)
IMM GRANULOCYTES NFR BLD AUTO: 0.9 % (ref 0–0.5)
LYMPHOCYTES # BLD AUTO: 0.2 10*3/MM3 (ref 0.7–3.1)
LYMPHOCYTES NFR BLD AUTO: 1.7 % (ref 19.6–45.3)
MCH RBC QN AUTO: 30.7 PG (ref 26.6–33)
MCHC RBC AUTO-ENTMCNC: 31.2 G/DL (ref 31.5–35.7)
MCV RBC AUTO: 98.6 FL (ref 79–97)
MONOCYTES # BLD AUTO: 0.48 10*3/MM3 (ref 0.1–0.9)
MONOCYTES NFR BLD AUTO: 4 % (ref 5–12)
NEUTROPHILS NFR BLD AUTO: 11.26 10*3/MM3 (ref 1.7–7)
NEUTROPHILS NFR BLD AUTO: 93.3 % (ref 42.7–76)
NRBC BLD AUTO-RTO: 0 /100 WBC (ref 0–0.2)
PLATELET # BLD AUTO: 220 10*3/MM3 (ref 140–450)
PMV BLD AUTO: 9.7 FL (ref 6–12)
POTASSIUM SERPL-SCNC: 4.2 MMOL/L (ref 3.5–5.2)
PROT SERPL-MCNC: 5.3 G/DL (ref 6–8.5)
RBC # BLD AUTO: 3.48 10*6/MM3 (ref 3.77–5.28)
SODIUM SERPL-SCNC: 137 MMOL/L (ref 136–145)
WBC NRBC COR # BLD: 12.06 10*3/MM3 (ref 3.4–10.8)

## 2023-07-22 PROCEDURE — 80053 COMPREHEN METABOLIC PANEL: CPT | Performed by: INTERNAL MEDICINE

## 2023-07-22 PROCEDURE — 25010000002 ENOXAPARIN PER 10 MG: Performed by: INTERNAL MEDICINE

## 2023-07-22 PROCEDURE — 25010000002 PROCHLORPERAZINE 10 MG/2ML SOLUTION: Performed by: INTERNAL MEDICINE

## 2023-07-22 PROCEDURE — 25010000002 CEFTRIAXONE PER 250 MG: Performed by: INTERNAL MEDICINE

## 2023-07-22 PROCEDURE — 25010000002 AZITHROMYCIN PER 500 MG: Performed by: INTERNAL MEDICINE

## 2023-07-22 PROCEDURE — 25010000002 ONDANSETRON PER 1 MG: Performed by: INTERNAL MEDICINE

## 2023-07-22 PROCEDURE — 25010000002 METHYLPREDNISOLONE PER 40 MG: Performed by: INTERNAL MEDICINE

## 2023-07-22 PROCEDURE — 85025 COMPLETE CBC W/AUTO DIFF WBC: CPT | Performed by: INTERNAL MEDICINE

## 2023-07-22 PROCEDURE — 94799 UNLISTED PULMONARY SVC/PX: CPT

## 2023-07-22 PROCEDURE — 94664 DEMO&/EVAL PT USE INHALER: CPT

## 2023-07-22 RX ORDER — PROCHLORPERAZINE EDISYLATE 5 MG/ML
5 INJECTION INTRAMUSCULAR; INTRAVENOUS EVERY 6 HOURS PRN
Status: DISCONTINUED | OUTPATIENT
Start: 2023-07-22 | End: 2023-08-01 | Stop reason: HOSPADM

## 2023-07-22 RX ORDER — TRAZODONE HYDROCHLORIDE 50 MG/1
50 TABLET ORAL NIGHTLY PRN
Status: DISCONTINUED | OUTPATIENT
Start: 2023-07-22 | End: 2023-08-01 | Stop reason: HOSPADM

## 2023-07-22 RX ADMIN — ONDANSETRON 4 MG: 2 INJECTION INTRAMUSCULAR; INTRAVENOUS at 17:36

## 2023-07-22 RX ADMIN — IPRATROPIUM BROMIDE AND ALBUTEROL SULFATE 3 ML: 2.5; .5 SOLUTION RESPIRATORY (INHALATION) at 03:31

## 2023-07-22 RX ADMIN — LISINOPRIL 20 MG: 20 TABLET ORAL at 10:38

## 2023-07-22 RX ADMIN — HYDROCODONE BITARTRATE AND ACETAMINOPHEN 1 TABLET: 5; 325 TABLET ORAL at 23:23

## 2023-07-22 RX ADMIN — IPRATROPIUM BROMIDE AND ALBUTEROL SULFATE 3 ML: 2.5; .5 SOLUTION RESPIRATORY (INHALATION) at 14:16

## 2023-07-22 RX ADMIN — SENNOSIDES AND DOCUSATE SODIUM 2 TABLET: 50; 8.6 TABLET ORAL at 10:39

## 2023-07-22 RX ADMIN — Medication 10 ML: at 20:40

## 2023-07-22 RX ADMIN — HYDROCODONE BITARTRATE AND ACETAMINOPHEN 1 TABLET: 5; 325 TABLET ORAL at 06:19

## 2023-07-22 RX ADMIN — PROCHLORPERAZINE EDISYLATE 5 MG: 5 INJECTION, SOLUTION INTRAMUSCULAR; INTRAVENOUS at 20:40

## 2023-07-22 RX ADMIN — TRAZODONE HYDROCHLORIDE 50 MG: 50 TABLET ORAL at 23:23

## 2023-07-22 RX ADMIN — FAMOTIDINE 20 MG: 20 TABLET ORAL at 10:38

## 2023-07-22 RX ADMIN — CEFTRIAXONE SODIUM 1000 MG: 1 INJECTION, SOLUTION INTRAVENOUS at 12:45

## 2023-07-22 RX ADMIN — ATORVASTATIN CALCIUM 20 MG: 20 TABLET, FILM COATED ORAL at 20:41

## 2023-07-22 RX ADMIN — POLYETHYLENE GLYCOL 3350 17 G: 17 POWDER, FOR SOLUTION ORAL at 20:40

## 2023-07-22 RX ADMIN — ONDANSETRON 4 MG: 2 INJECTION INTRAMUSCULAR; INTRAVENOUS at 11:55

## 2023-07-22 RX ADMIN — METHYLPREDNISOLONE SODIUM SUCCINATE 40 MG: 40 INJECTION INTRAMUSCULAR; INTRAVENOUS at 10:39

## 2023-07-22 RX ADMIN — Medication 1 TABLET: at 10:39

## 2023-07-22 RX ADMIN — AZITHROMYCIN 500 MG: 500 INJECTION, POWDER, LYOPHILIZED, FOR SOLUTION INTRAVENOUS at 10:41

## 2023-07-22 RX ADMIN — METHYLPREDNISOLONE SODIUM SUCCINATE 40 MG: 40 INJECTION INTRAMUSCULAR; INTRAVENOUS at 17:10

## 2023-07-22 RX ADMIN — Medication 10 ML: at 10:39

## 2023-07-22 RX ADMIN — ENOXAPARIN SODIUM 30 MG: 100 INJECTION SUBCUTANEOUS at 10:39

## 2023-07-22 RX ADMIN — METOPROLOL SUCCINATE 25 MG: 25 TABLET, EXTENDED RELEASE ORAL at 10:39

## 2023-07-22 RX ADMIN — IPRATROPIUM BROMIDE AND ALBUTEROL SULFATE 3 ML: 2.5; .5 SOLUTION RESPIRATORY (INHALATION) at 19:18

## 2023-07-22 RX ADMIN — METHYLPREDNISOLONE SODIUM SUCCINATE 40 MG: 40 INJECTION INTRAMUSCULAR; INTRAVENOUS at 02:52

## 2023-07-22 RX ADMIN — IPRATROPIUM BROMIDE AND ALBUTEROL SULFATE 3 ML: 2.5; .5 SOLUTION RESPIRATORY (INHALATION) at 07:43

## 2023-07-22 RX ADMIN — SENNOSIDES AND DOCUSATE SODIUM 2 TABLET: 50; 8.6 TABLET ORAL at 20:41

## 2023-07-22 NOTE — PROGRESS NOTES
[facility]     Progress Note    Patient Name: Sandra Sylvester  : 1945  MRN: [id number]  Primary Care Physician:  René Garrett MD  Date of admission: 2023    Subjective   Subjective     Chief Complaint: Pneumonia, skin tear    HPI:  Patient Reports reports she developed a tear of her left forearm skin.  Denies any problems with her breathing    Review of Systems   Review of Systems   Constitutional:  Activity change: in bed.   Eyes: Negative.    Respiratory: Negative.  Shortness of breath: some.    Cardiovascular: Negative.    Gastrointestinal: Negative.    Endocrine: Negative.    Genitourinary: Negative.    Musculoskeletal: Negative.    Skin:  Wound: skin tear.   Allergic/Immunologic: Negative.    Neurological: Negative.  Positive for weakness.   Hematological: Negative.    Psychiatric/Behavioral: Negative.       Objective   Objective     Vitals:   Temp:  [97.7 øF (36.5 øC)-98.6 øF (37 øC)] 97.9 øF (36.6 øC)  Heart Rate:  [81-95] 81  Resp:  [18-22] 18  BP: (109-154)/(59-85) 124/85  Flow (L/min):  [1.5-2] 2  Physical Exam    Constitutional: Awake, alert   Eyes: PERRLA, sclerae anicteric, no conjunctival injection   HENT: NCAT, mucous membranes moist   Neck: Supple, no thyromegaly, no lymphadenopathy, trachea midline   Respiratory: Clear to auscultation bilaterally, nonlabored respirations    Cardiovascular: RRR, no murmurs, rubs, or gallops, palpable pedal pulses bilaterally   Gastrointestinal: Positive bowel sounds, soft, nontender, nondistended   Musculoskeletal: No bilateral ankle edema, no clubbing or cyanosis to extremities   Psychiatric: Appropriate affect, cooperative   Neurologic: Oriented x 3, strength symmetric in all extremities, Cranial Nerves grossly intact to confrontation, speech clear   Skin: No rashes     Result Review    Result Review:  I have personally reviewed the results from the time of this admission to 2023 11:36 EDT and agree with these findings:  [x]  Laboratory  []   Microbiology  []  Radiology  []  EKG/Telemetry   []  Cardiology/Vascular   []  Pathology  []  Old records  []  Other:  Most notable findings include: Pneumonia on Antibiotics    Assessment & Plan   Assessment / Plan     Brief Patient Summary:  Sandra Sylvester is a 78 y.o. female who is being treated for pneumonia and hypoxia    Active Hospital Problems:  Active Hospital Problems    Diagnosis     **PNA (pneumonia)     COPD with acute exacerbation     Hypoxia     Severe malnutrition      Plan:   CT chest shows a large pleural effusion and 3 cm cystic mass in right axilla and a small left effusion  Continue antibiotics  DVT prophylaxis:  Medical DVT prophylaxis orders are present.    CODE STATUS:   Level Of Support Discussed With: Patient  Code Status (Patient has no pulse and is not breathing): CPR (Attempt to Resuscitate)  Medical Interventions (Patient has pulse or is breathing): Full Support  Release to patient: Routine Release      Electronically signed by Cole Deng MD, 07/22/23, 11:36 AM EDT.

## 2023-07-23 PROCEDURE — 94799 UNLISTED PULMONARY SVC/PX: CPT

## 2023-07-23 PROCEDURE — 25010000002 ENOXAPARIN PER 10 MG: Performed by: INTERNAL MEDICINE

## 2023-07-23 PROCEDURE — 25010000002 PROCHLORPERAZINE 10 MG/2ML SOLUTION: Performed by: INTERNAL MEDICINE

## 2023-07-23 PROCEDURE — 25010000002 AZITHROMYCIN PER 500 MG: Performed by: INTERNAL MEDICINE

## 2023-07-23 PROCEDURE — 25010000002 CEFTRIAXONE PER 250 MG: Performed by: INTERNAL MEDICINE

## 2023-07-23 PROCEDURE — 25010000002 METHYLPREDNISOLONE PER 40 MG: Performed by: INTERNAL MEDICINE

## 2023-07-23 PROCEDURE — 94664 DEMO&/EVAL PT USE INHALER: CPT

## 2023-07-23 RX ADMIN — METOPROLOL SUCCINATE 25 MG: 25 TABLET, EXTENDED RELEASE ORAL at 09:12

## 2023-07-23 RX ADMIN — PROCHLORPERAZINE EDISYLATE 5 MG: 5 INJECTION, SOLUTION INTRAMUSCULAR; INTRAVENOUS at 09:41

## 2023-07-23 RX ADMIN — SENNOSIDES AND DOCUSATE SODIUM 2 TABLET: 50; 8.6 TABLET ORAL at 20:18

## 2023-07-23 RX ADMIN — HYDROCODONE BITARTRATE AND ACETAMINOPHEN 1 TABLET: 5; 325 TABLET ORAL at 09:41

## 2023-07-23 RX ADMIN — Medication 1 TABLET: at 09:12

## 2023-07-23 RX ADMIN — IPRATROPIUM BROMIDE AND ALBUTEROL SULFATE 3 ML: 2.5; .5 SOLUTION RESPIRATORY (INHALATION) at 15:28

## 2023-07-23 RX ADMIN — FAMOTIDINE 20 MG: 20 TABLET ORAL at 09:12

## 2023-07-23 RX ADMIN — LISINOPRIL 20 MG: 20 TABLET ORAL at 09:12

## 2023-07-23 RX ADMIN — Medication 10 ML: at 09:12

## 2023-07-23 RX ADMIN — TRAZODONE HYDROCHLORIDE 50 MG: 50 TABLET ORAL at 20:18

## 2023-07-23 RX ADMIN — METHYLPREDNISOLONE SODIUM SUCCINATE 40 MG: 40 INJECTION INTRAMUSCULAR; INTRAVENOUS at 02:06

## 2023-07-23 RX ADMIN — IPRATROPIUM BROMIDE AND ALBUTEROL SULFATE 3 ML: 2.5; .5 SOLUTION RESPIRATORY (INHALATION) at 07:48

## 2023-07-23 RX ADMIN — IPRATROPIUM BROMIDE AND ALBUTEROL SULFATE 3 ML: 2.5; .5 SOLUTION RESPIRATORY (INHALATION) at 19:02

## 2023-07-23 RX ADMIN — AZITHROMYCIN 500 MG: 500 INJECTION, POWDER, LYOPHILIZED, FOR SOLUTION INTRAVENOUS at 10:38

## 2023-07-23 RX ADMIN — METHYLPREDNISOLONE SODIUM SUCCINATE 40 MG: 40 INJECTION INTRAMUSCULAR; INTRAVENOUS at 17:34

## 2023-07-23 RX ADMIN — Medication 10 ML: at 20:18

## 2023-07-23 RX ADMIN — IPRATROPIUM BROMIDE AND ALBUTEROL SULFATE 3 ML: 2.5; .5 SOLUTION RESPIRATORY (INHALATION) at 23:56

## 2023-07-23 RX ADMIN — ATORVASTATIN CALCIUM 20 MG: 20 TABLET, FILM COATED ORAL at 20:18

## 2023-07-23 RX ADMIN — ENOXAPARIN SODIUM 30 MG: 100 INJECTION SUBCUTANEOUS at 09:11

## 2023-07-23 RX ADMIN — CEFTRIAXONE SODIUM 1000 MG: 1 INJECTION, SOLUTION INTRAVENOUS at 09:11

## 2023-07-23 RX ADMIN — SENNOSIDES AND DOCUSATE SODIUM 2 TABLET: 50; 8.6 TABLET ORAL at 09:12

## 2023-07-23 RX ADMIN — METHYLPREDNISOLONE SODIUM SUCCINATE 40 MG: 40 INJECTION INTRAMUSCULAR; INTRAVENOUS at 09:11

## 2023-07-23 RX ADMIN — IPRATROPIUM BROMIDE AND ALBUTEROL SULFATE 3 ML: 2.5; .5 SOLUTION RESPIRATORY (INHALATION) at 11:45

## 2023-07-23 RX ADMIN — IPRATROPIUM BROMIDE AND ALBUTEROL SULFATE 3 ML: 2.5; .5 SOLUTION RESPIRATORY (INHALATION) at 00:28

## 2023-07-23 RX ADMIN — IPRATROPIUM BROMIDE AND ALBUTEROL SULFATE 3 ML: 2.5; .5 SOLUTION RESPIRATORY (INHALATION) at 03:42

## 2023-07-23 NOTE — PLAN OF CARE
Goal Outcome Evaluation:  Plan of Care Reviewed With: patient        Progress: no change  Outcome Evaluation: patient is alert and oriented this shift, does have intermittent confusion and forgetfulness. medicated with prn pain medication. up to chair this shift. encouraged use of incentive spirometer. no other changes at this time.

## 2023-07-23 NOTE — PROGRESS NOTES
[facility]     Progress Note    Patient Name: Sandra Sylvester  : 1945  MRN: [id number]  Primary Care Physician:  René Garrett MD  Date of admission: 2023    Subjective   Subjective     Chief Complaint: Pneumonia    HPI:  Patient Reports she is alert sitting up, breathing better    Review of Systems   Review of Systems   Constitutional:  Activity change: up in bed.   HENT: Negative.     Eyes: Negative.    Respiratory: Negative.     Cardiovascular: Negative.    Gastrointestinal: Negative.    Endocrine: Negative.    Genitourinary: Negative.    Musculoskeletal: Negative.    Skin: Negative.    Allergic/Immunologic: Negative.    Neurological:  Positive for weakness.   Hematological: Negative.    Psychiatric/Behavioral: Negative.       Objective   Objective     Vitals:   Temp:  [97.5 øF (36.4 øC)-98.1 øF (36.7 øC)] 97.5 øF (36.4 øC)  Heart Rate:  [73-92] 75  Resp:  [18-20] 18  BP: (109-149)/(46-67) 141/62  Flow (L/min):  [2] 2  Physical Exam    Constitutional: Awake, alert   Eyes: PERRLA, sclerae anicteric, no conjunctival injection   HENT: NCAT, mucous membranes moist   Neck: Supple, no thyromegaly, no lymphadenopathy, trachea midline   Respiratory: Clear to auscultation bilaterally, nonlabored respirations    Cardiovascular: RRR, no murmurs, rubs, or gallops, palpable pedal pulses bilaterally   Gastrointestinal: Positive bowel sounds, soft, nontender, nondistended   Musculoskeletal: No bilateral ankle edema, no clubbing or cyanosis to extremities   Psychiatric: Appropriate affect, cooperative   Neurologic: Oriented x 3, strength symmetric in all extremities, Cranial Nerves grossly intact to confrontation, speech clear   Skin: No rashes     Result Review    Result Review:  I have personally reviewed the results from the time of this admission to 2023 15:49 EDT and agree with these findings:  [x]  Laboratory  []  Microbiology  []  Radiology  []  EKG/Telemetry   []  Cardiology/Vascular   []   Pathology  []  Old records  []  Other:  Most notable findings include: Breathing better    Assessment & Plan   Assessment / Plan     Brief Patient Summary:  Sandra Sylvester is a 78 y.o. female who is being treated for pneumonia    Active Hospital Problems:  Active Hospital Problems    Diagnosis     **PNA (pneumonia)     COPD with acute exacerbation     Hypoxia     Severe malnutrition      Plan:   CT chest  shows large right pleural effusion and 3 cm cystic mass in right axilla and small left effusion  Continue antibiotics  DVT prophylaxis:  Medical DVT prophylaxis orders are present.    CODE STATUS:   Level Of Support Discussed With: Patient  Code Status (Patient has no pulse and is not breathing): CPR (Attempt to Resuscitate)  Medical Interventions (Patient has pulse or is breathing): Full Support  Release to patient: Routine Release      Electronically signed by Cole Deng MD, 07/23/23, 3:49 PM EDT.

## 2023-07-24 PROBLEM — J90 PLEURAL EFFUSION: Status: ACTIVE | Noted: 2023-01-01

## 2023-07-24 LAB
BACTERIA SPEC AEROBE CULT: NORMAL
BACTERIA SPEC AEROBE CULT: NORMAL

## 2023-07-24 PROCEDURE — 25010000002 CEFTRIAXONE PER 250 MG: Performed by: INTERNAL MEDICINE

## 2023-07-24 PROCEDURE — 94799 UNLISTED PULMONARY SVC/PX: CPT

## 2023-07-24 PROCEDURE — 99223 1ST HOSP IP/OBS HIGH 75: CPT | Performed by: INTERNAL MEDICINE

## 2023-07-24 PROCEDURE — 94664 DEMO&/EVAL PT USE INHALER: CPT

## 2023-07-24 PROCEDURE — 25010000002 METHYLPREDNISOLONE PER 40 MG: Performed by: INTERNAL MEDICINE

## 2023-07-24 PROCEDURE — 97110 THERAPEUTIC EXERCISES: CPT

## 2023-07-24 PROCEDURE — 25010000002 ENOXAPARIN PER 10 MG: Performed by: INTERNAL MEDICINE

## 2023-07-24 PROCEDURE — 25010000002 ONDANSETRON PER 1 MG: Performed by: INTERNAL MEDICINE

## 2023-07-24 RX ORDER — IPRATROPIUM BROMIDE AND ALBUTEROL SULFATE 2.5; .5 MG/3ML; MG/3ML
3 SOLUTION RESPIRATORY (INHALATION)
Status: DISCONTINUED | OUTPATIENT
Start: 2023-07-24 | End: 2023-07-26

## 2023-07-24 RX ADMIN — METHYLPREDNISOLONE SODIUM SUCCINATE 40 MG: 40 INJECTION INTRAMUSCULAR; INTRAVENOUS at 03:06

## 2023-07-24 RX ADMIN — LISINOPRIL 20 MG: 20 TABLET ORAL at 08:59

## 2023-07-24 RX ADMIN — ONDANSETRON 4 MG: 2 INJECTION INTRAMUSCULAR; INTRAVENOUS at 13:59

## 2023-07-24 RX ADMIN — METOPROLOL SUCCINATE 25 MG: 25 TABLET, EXTENDED RELEASE ORAL at 08:59

## 2023-07-24 RX ADMIN — FAMOTIDINE 20 MG: 20 TABLET ORAL at 08:59

## 2023-07-24 RX ADMIN — SENNOSIDES AND DOCUSATE SODIUM 2 TABLET: 50; 8.6 TABLET ORAL at 08:59

## 2023-07-24 RX ADMIN — IPRATROPIUM BROMIDE AND ALBUTEROL SULFATE 3 ML: 2.5; .5 SOLUTION RESPIRATORY (INHALATION) at 06:35

## 2023-07-24 RX ADMIN — Medication 10 ML: at 20:46

## 2023-07-24 RX ADMIN — IPRATROPIUM BROMIDE AND ALBUTEROL SULFATE 3 ML: 2.5; .5 SOLUTION RESPIRATORY (INHALATION) at 10:31

## 2023-07-24 RX ADMIN — CEFTRIAXONE SODIUM 1000 MG: 1 INJECTION, SOLUTION INTRAVENOUS at 08:58

## 2023-07-24 RX ADMIN — METHYLPREDNISOLONE SODIUM SUCCINATE 40 MG: 40 INJECTION INTRAMUSCULAR; INTRAVENOUS at 10:58

## 2023-07-24 RX ADMIN — Medication 1 TABLET: at 08:59

## 2023-07-24 RX ADMIN — ENOXAPARIN SODIUM 30 MG: 100 INJECTION SUBCUTANEOUS at 08:58

## 2023-07-24 RX ADMIN — IPRATROPIUM BROMIDE AND ALBUTEROL SULFATE 3 ML: 2.5; .5 SOLUTION RESPIRATORY (INHALATION) at 03:00

## 2023-07-24 RX ADMIN — METHYLPREDNISOLONE SODIUM SUCCINATE 40 MG: 40 INJECTION INTRAMUSCULAR; INTRAVENOUS at 18:35

## 2023-07-24 RX ADMIN — IPRATROPIUM BROMIDE AND ALBUTEROL SULFATE 3 ML: .5; 3 SOLUTION RESPIRATORY (INHALATION) at 19:58

## 2023-07-24 RX ADMIN — ATORVASTATIN CALCIUM 20 MG: 20 TABLET, FILM COATED ORAL at 20:46

## 2023-07-24 NOTE — PLAN OF CARE
Goal Outcome Evaluation:  Plan of Care Reviewed With: patient        Progress: no change  Outcome Evaluation: pt aox4, vs stable. pt on 2 L nc, feels short of breath with exertion. no new issues at this time

## 2023-07-24 NOTE — THERAPY TREATMENT NOTE
Patient Name: Sandra Sylvester  : 1945    MRN: [id number]                              Today's Date: 2023       Admit Date: 2023    Visit Dx:     ICD-10-CM ICD-9-CM   1. Pneumonia due to infectious organism, unspecified laterality, unspecified part of lung  J18.9 486   2. Acute respiratory failure with hypoxia  J96.01 518.81   3. Decreased activities of daily living (ADL)  Z78.9 V49.89   4. Difficulty walking  R26.2 719.7     Patient Active Problem List   Diagnosis    Closed fracture of right hip    Closed nondisplaced fracture of acetabulum    HTN (hypertension)    Malnourished    BMI less than 19,adult    Severe malnutrition    PNA (pneumonia)    COPD with acute exacerbation    Hypoxia    Pleural effusion     Past Medical History:   Diagnosis Date    COPD (chronic obstructive pulmonary disease)     Hyperlipidemia     Hypertension      Past Surgical History:   Procedure Laterality Date    CHOLECYSTECTOMY        General Information       Row Name 23 1532          OT Time and Intention    Document Type therapy note (daily note)  -LF     Mode of Treatment individual therapy;occupational therapy  -LF               User Key  (r) = Recorded By, (t) = Taken By, (c) = Cosigned By      Initials Name Provider Type    LF Krystyna James OT Occupational Therapist                     Mobility/ADL's       Row Name 23 1532          Bed Mobility    Comment, (Bed Mobility) Patient seated on EOB upon OT's arrival.  -LF       Row Name 23 1532          Transfers    Transfers sit-stand transfer;stand-sit transfer  -LF       Row Name 23 1532          Sit-Stand Transfer    Sit-Stand [place] (Transfers) standby assist  -LF     Assistive Device (Sit-Stand Transfers) other (see comments)  bedrail  -LF       Row Name 23 1532          Stand-Sit Transfer    Stand-Sit [place] (Transfers) standby assist  -LF     Assistive Device (Stand-Sit Transfers) other (see comments)  bedrail  -LF                User Key  (r) = Recorded By, (t) = Taken By, (c) = Cosigned By      Initials Name Provider Type     Krystyna James OT Occupational Therapist                   Obj/Interventions       Row Name 07/24/23 1534          Shoulder (Therapeutic Exercise)    Shoulder (Therapeutic Exercise) strengthening exercise  -     Shoulder Strengthening (Therapeutic Exercise) bilateral;resistance band;yellow  Shoulder flexion/extension, forward reach, and horizontal abduction/adduction 2 sets x 10 reps.  -       Row Name 07/24/23 1534          Elbow/Forearm (Therapeutic Exercise)    Elbow/Forearm (Therapeutic Exercise) strengthening exercise  -     Elbow/Forearm Strengthening (Therapeutic Exercise) bilateral;flexion;extension;resistance band;yellow;2 sets;10 repetitions  -       Row Name 07/24/23 1534          Motor Skills    Motor Skills functional endurance  -     Functional Endurance Fair  -     Therapeutic Exercise shoulder;elbow/forearm  -LF       Row Name 07/24/23 1534          Balance    Balance Assessment sitting dynamic balance  -     Dynamic Sitting Balance supervision  -     Position, Sitting Balance unsupported;sitting edge of bed  -     Balance Interventions sitting;dynamic;UE activity with balance activity  -               User Key  (r) = Recorded By, (t) = Taken By, (c) = Cosigned By      Initials Name Provider Type     Krystyna James OT Occupational Therapist                   Goals/Plan    No documentation.                  Clinical Impression       [facility] Name 07/24/23 1535          Pain Assessment    Additional Documentation Pain Scale: FACES Pre/Post-Treatment (Group)  -LF       Row Name 07/24/23 1535          Pain Scale: FACES Pre/Post-Treatment    Pain: FACES Scale, Pretreatment 0-->no hurt  -     Posttreatment Pain Rating 0-->no hurt  -LF       Row Name 07/24/23 1537          Plan of Care Review    Plan of Care Reviewed With patient  -     Progress improving  -      Outcome Evaluation Patient agreeable to BUE exercises on EOB, demonstrating fair endurance and requiring one rest break between sets. She would benefit from continued OT services to maximize independence.  -LF       Row Name 07/24/23 1535          Vital Signs    O2 Delivery Pre Treatment nasal cannula  -LF     O2 Delivery Intra Treatment nasal cannula  -LF     O2 Delivery Post Treatment nasal cannula  -LF               User Key  (r) = Recorded By, (t) = Taken By, (c) = Cosigned By      Initials Name Provider Type    Krystyna Ramos OT Occupational Therapist                   Outcome Measures       Row Name 07/24/23 1536          How much help from another is currently needed...    Putting on and taking off regular lower body clothing? 3  -LF     Bathing (including washing, rinsing, and drying) 3  -LF     Toileting (which includes using toilet bed pan or urinal) 3  -LF     Putting on and taking off regular upper body clothing 4  -LF     Taking care of personal grooming (such as brushing teeth) 4  -LF     Eating meals 4  -LF     AM-PAC 6 Clicks Score (OT) 21  -LF       Row Name 07/24/23 1536          Functional Assessment    Outcome Measure Options AM-PAC 6 Clicks Daily Activity (OT);Optimal Instrument  -LF       Row Name 07/24/23 1536          Optimal Instrument    Optimal Instrument Optimal - 3  -LF     Bending/Stooping 1  -LF     Standing 2  -LF     Reaching 1  -LF     From the list, choose the 3 activities you would most like to be able to do without any difficulty Bending/stooping;Standing;Reaching  -LF     Total Score Optimal - 3 4  -LF               User Key  (r) = Recorded By, (t) = Taken By, (c) = Cosigned By      Initials Name Provider Type    Krystyna Ramos OT Occupational Therapist                    Occupational Therapy Education       Title: PT OT SLP Therapies (In Progress)       Topic: Occupational Therapy (In Progress)       Point: ADL training (Done)       Description:   Instruct  learner(s) on proper safety adaptation and remediation techniques during self care or transfers.   Instruct in proper use of assistive devices.                  Learning Progress Summary             Patient Acceptance, E, VU by ELAINE at 7/21/2023 1032                         Point: Home exercise program (Not Started)       Description:   Instruct learner(s) on appropriate technique for monitoring, assisting and/or progressing therapeutic exercises/activities.                  Learner Progress:  Not documented in this visit.              Point: Precautions (Not Started)       Description:   Instruct learner(s) on prescribed precautions during self-care and functional transfers.                  Learner Progress:  Not documented in this visit.              Point: Body mechanics (Not Started)       Description:   Instruct learner(s) on proper positioning and spine alignment during self-care, functional mobility activities and/or exercises.                  Learner Progress:  Not documented in this visit.                              User Key       Initials Effective Dates Name Provider Type Discipline    ELAINE 06/16/21 -  Toñito Cardenas, OT Occupational Therapist OT                  OT Recommendation and Plan     Plan of Care Review  Plan of Care Reviewed With: patient  Progress: improving  Outcome Evaluation: Patient agreeable to BUE exercises on EOB, demonstrating fair endurance and requiring one rest break between sets. She would benefit from continued OT services to maximize independence.     Time Calculation:         Time Calculation- OT       Row Name 07/24/23 1536             Time Calculation- OT    OT Received On 07/24/23  -LF      OT Goal Re-Cert Due Date 07/30/23  -LF         Timed Charges    39468 - OT Therapeutic Exercise Minutes 10  -LF      96464 - OT Therapeutic Activity Minutes 5  -LF         Total Minutes    Timed Charges Total Minutes 15  -LF       Total Minutes 15  -LF                User Key  (r) =  Recorded By, (t) = Taken By, (c) = Cosigned By      Initials Name Provider Type     Krystyna James, PURA Occupational Therapist                  Therapy Charges for Today       Code Description Service Date Service Provider Modifiers Qty    [id number] HC OT THER PROC EA 15 MIN 7/24/2023 Krystyna James OT  1                 Krystyna James OT  7/24/2023

## 2023-07-24 NOTE — CONSULTS
"Nutrition Services    Patient Name: Sandra Sylvester  YOB: 1945  MRN: [id number]  Admission date: 7/19/2023      CLINICAL NUTRITION ASSESSMENT      Reason for Assessment  Identified at risk by screening criteria, MST score 2+, BMI     H&P:    Past Medical History:   Diagnosis Date    COPD (chronic obstructive pulmonary disease)     Hyperlipidemia     Hypertension         Current Problems:   Active Hospital Problems    Diagnosis     **PNA (pneumonia)     COPD with acute exacerbation     Hypoxia     Severe malnutrition         Nutrition/Diet History         Narrative     78 year old female admitted with SOA/pneumonia.  See PMH above.    Feeds self Cardiac Diet, Regular Texture , Thin Fluids.  Average intake is 30% of meals.      Noted with a rash to skin on lower portion of body and upper legs.  Wayne Score = 16  NFPE previously conducted by MICHEL.  Pt met criteria for Severe Malnutrition.    Educated pt about current diet and ONS, pt agreed she needs flavor variety.  States she feels hungry and food sounds good but when it arrives she just does not have an appetite.  Agreed to alternate flavors and try a strawberry mighty shake with supper.  Pt doing a good job keeping ONS in room and drinks on it between meals.       Anthropometrics        Current Height, Weight Height: 149.9 cm (59\")  Weight: 33.6 kg (74 lb 1.2 oz)   Current BMI Body mass index is 14.96 kg/mý.       Weight Hx  Wt Readings from Last 30 Encounters:   07/20/23 0112 33.6 kg (74 lb 1.2 oz)   07/19/23 1850 34.1 kg (75 lb 2.8 oz)   01/13/23 0600 38.4 kg (84 lb 10.5 oz)   01/12/23 0555 37.6 kg (82 lb 14.3 oz)   01/11/23 0625 38.4 kg (84 lb 10.5 oz)   01/10/23 0452 38.9 kg (85 lb 12.1 oz)   01/10/23 0155 38.9 kg (85 lb 12.1 oz)   01/09/23 2025 42 kg (92 lb 9.5 oz)   09/01/22 1426 40.8 kg (90 lb)   08/01/22 1431 40.8 kg (90 lb)   06/07/22 1016 40.8 kg (90 lb)   05/07/22 1924 40.9 kg (90 lb 2.7 oz)            Wt Change Observation Wt decrease of " 10# (11%) in 6 months     Estimated/Assessed Needs       Energy Requirements #/48.8kg   EST Needs (kcal/day) 30-35 kcals/kg = 1464 - 1708 calories       Protein Requirements    EST Daily Needs (g/day) 1.0-1.2 g/kg = 48.8- 58 grams       Fluid Requirements     Estimated Needs (mL/day) 30-35 ml/kg = 1464 - 1708 ml  (6-7 cups)     Labs/Medications         Pertinent Labs Reviewed.   Results from last 7 days   Lab Units 07/22/23  0537 07/20/23  0146 07/19/23  1934 07/19/23  1855   SODIUM mmol/L 137 137  --  139   SODIUM, ARTERIAL mmol/L  --   --  135.3*  --    POTASSIUM mmol/L 4.2 4.1  --  4.7   CHLORIDE mmol/L 106 100  --  99   CO2 mmol/L 23.6 20.2*  --  24.2   BUN mg/dL 12 14  --  14   CREATININE mg/dL 0.44* 0.53*  --  0.72   CALCIUM mg/dL 9.2 9.2  --  9.8   BILIRUBIN mg/dL 0.2  --   --  0.8   ALK PHOS U/L 106  --   --  193*   ALT (SGPT) U/L 11  --   --  11   AST (SGOT) U/L 20  --   --  29   GLUCOSE mg/dL 135* 112*  --  107*   GLUCOSE, ARTERIAL mg/dL  --   --  87  --        Results from last 7 days   Lab Units 07/22/23  0537   HEMOGLOBIN g/dL 10.7*   HEMATOCRIT % 34.3       COVID19   Date Value Ref Range Status   07/19/2023 Not Detected Not Detected - Ref. Range Final     No results found for: HGBA1C      Pertinent Medications Reviewed.     Current Nutrition Orders & Evaluation of Intake       Oral Nutrition     Current PO Diet Diet: Cardiac Diets; Healthy Heart (2-3 Na+); Texture: Regular Texture (IDDSI 7); Fluid Consistency: Thin (IDDSI 0)   Supplement Orders Placed This Encounter      Dietary Nutrition Supplements Boost Plus (Ensure Plus)       Malnutrition Severity Assessment      Patient meets criteria for : Severe Malnutrition           Nutrition Diagnosis         Nutrition Dx Problem 1 Severe malnutrition related to inadequate energy Intake as evidenced by unintended wt change. and body composition changes.       Nutrition Intervention         Cardiac Diet, Regular Texture, Thin liquids  Boost Plus - 1  carton with B vanilla, L  chocolate and a strawberry mighty shake with supper.   +940 calories, 34 g protein     Medical Nutrition Therapy/Nutrition Education          Learner     Readiness Patient  Acceptance     Method     Response Explanation  Verbalizes understanding     Monitor/Evaluation        Monitor Per protocol, PO intake, Supplement intake, Pertinent labs, Weight, POC/GOC       Nutrition Discharge Plan         Pt will need to continue ONS at least 3 per day upond discharge       Electronically signed by:  Jannette Rankin RD  07/24/23 14:32 EDT

## 2023-07-24 NOTE — CONSULTS
Pulmonary / Critical Care Consult Note      Patient Name: Sandra Sylvester  : 1945  MRN: [id number]  Primary Care Physician:  René Garrett MD  Referring Physician: No ref. provider found  Date of admission: 2023    Subjective   Subjective     Reason for Consult/ Chief Complaint: Pleural effusion    HPI:  Sandra Sylvester is a 78 y.o. female with history of chronic heavy smoking, 1 pack a day for more than 55 years, was admitted to the hospital with worsening shortness of breath for few days, was having desaturations to 80s in the emergency room, chest x-ray showed possible large left-sided infiltrate.  CT scan of the chest was done during this admission, was found to have large right-sided pleural effusion and small left loculated pleural effusion.  She is being treated for pneumonia.  Pulmonary critical care services consulted for extensive management of her pleural effusion.  She has no significant changes in weight or appetite.  No significant leg swelling.  No nausea or vomiting.  No fever or chills.  I reviewed the CT scan result with her and discussed possible thoracentesis versus chest tube placement with herself and her daughter.    Review of Systems  General:  Fatigue, No Fever.   HEENT: No dysphagia, No Visual Changes, no rhinorrhea  Respiratory:  + cough,+Dyspnea, intermittent phlegm, No Pleuritic Pain, no wheezing, no hemoptysis  Cardiovascular: Denies chest pain, denies palpitations,+GARZON, No Chest Pressure  Gastrointestinal:  No Abdominal Pain, No Nausea, No Vomiting, No Diarrhea  Genitourinary:  No Dysuria, No Frequency, No Hesitancy  Musculoskeletal: No muscle pain or swelling  Endocrine:  No Heat Intolerance, No Cold Intolerance  Hematologic:  No Bleeding, No Bruising  Psychiatric:  No Anxiety, No Depression  Neurologic:  No Confusion, no Dysarthria, No Headaches  Skin:  No Rash, No Open Wounds        Personal History     Past Medical History:   Diagnosis Date    COPD (chronic  obstructive pulmonary disease)     Hyperlipidemia     Hypertension        Past Surgical History:   Procedure Laterality Date    CHOLECYSTECTOMY         Family History: No known family history of chronic lung disease or heart disease.    Social History:  reports that she has been smoking cigarettes. She has been smoking an average of .5 packs per day. She has never used smokeless tobacco. She reports current alcohol use. She reports that she does not use drugs.    Home Medications:  Oyster Shell Calcium/D, albuterol sulfate HFA, atorvastatin, clobetasol, metoprolol succinate XL, multivitamin with minerals, naproxen, and traZODone    Allergies:  Allergies   Allergen Reactions    Buspar [Buspirone] Delirium       Objective    Objective     Vitals:   Temp:  [96 øF (35.6 øC)-98.4 øF (36.9 øC)] 97.9 øF (36.6 øC)  Heart Rate:  [78-93] 83  Resp:  [18] 18  BP: (123-185)/(55-86) 159/81  Flow (L/min):  [2] 2    Physical Exam:  Vital Signs Reviewed   Frail looking female, on nasal oxygen, has conversational dyspnea  HEENT:  PERRL, EOMI.  OP, nares clear, no sinus tenderness  Neck:  Supple, no JVD, no thyromegaly  Lymph: no axillary, cervical, supraclavicular lymphadenopathy noted bilaterally  Chest: Poor aeration, dull to percussion right base, no wheezing or rhonchi, some crackles bilaterally  CV: RRR, no MGR, pulses 2+, equal.  Abd:  Soft, NT, ND, + BS, no HSM  EXT:  no clubbing, no cyanosis, no edema, no joint tenderness  Neuro:  A&Ox3, CN grossly intact, no focal deficits, generalized weakness+  Skin: No rashes or lesions noted      Result Review    Result Review:  I have personally reviewed the results from the time of this admission to 7/24/2023 15:58 EDT and agree with these findings:  [x]  Laboratory  [x]  Microbiology  [x]  Radiology  [x]  EKG/Telemetry   [x]  Cardiology/Vascular   []  Pathology  []  Old records  []  Other:  Most notable findings include:         Lab 07/22/23  0537 07/20/23  0146 07/19/23  1934  07/19/23  1855   WBC 12.06* 8.93  --  10.09   HEMOGLOBIN 10.7* 12.8  --  13.8   HEMATOCRIT 34.3 38.8  --  42.1   PLATELETS 220 234  --  270   SODIUM 137 137  --  139   SODIUM, ARTERIAL  --   --  135.3*  --    POTASSIUM 4.2 4.1  --  4.7   CHLORIDE 106 100  --  99   CO2 23.6 20.2*  --  24.2   BUN 12 14  --  14   CREATININE 0.44* 0.53*  --  0.72   GLUCOSE 135* 112*  --  107*   GLUCOSE, ARTERIAL  --   --  87  --    CALCIUM 9.2 9.2  --  9.8   TOTAL PROTEIN 5.3*  --   --  6.7   ALBUMIN 2.9*  --   --  3.5   GLOBULIN 2.4  --   --  3.2     CT Chest Without Contrast Diagnostic    Result Date: 7/22/2023  PROCEDURE: CT CHEST WO CONTRAST DIAGNOSTIC  COMPARISONS: 7/19/2023; 1/9/2023.  INDICATIONS: Pneumonia, complication suspected, x-ray done.  TECHNIQUE: 580 CT images were created without the administration of contrast material.   PROTOCOL:   Standard CT imaging protocol performed.    RADIATION:   Total DLP: 102 mGy*cm; total mAs: 1,294.   Automated exposure control was utilized to minimize radiation dose.  FINDINGS: There is a very large right-sided pleural effusion.  It may be loculated.  It has CT numbers ranging between 10 and 14 Hounsfield units.  Atelectasis and/or pneumonia may be present on the right and to a much lesser degree on the left.  There is a small-to-moderate left-sided pleural effusion, which may also be loculated pleural.  It has CT numbers ranging between 0 and 7 Hounsfield units.  Bilateral empyemas, accounting for the findings, cannot be excluded.  Hemothorax cannot be excluded but is thought to be less likely.  Please correlate clinically.  No gas is seen in pleural fluid collections bilaterally.  No definite acute or subacute rib fractures are seen.  Debris is seen in the upper thoracic esophageal lumen, which is mildly dilated.  Gastroesophageal reflux disease (GERD) would be in the differential diagnosis.  Esophageal dysmotility is possible.  Extensive atherosclerotic changes are present, including  involvement of the coronary arteries.  There is involvement of the thoracic aorta and the great arteries.  No aneurysmal dilatation of the thoracic aorta is suggested.  There is a trace amount of pericardial effusion.  No cardiac enlargement.  Chronic calcified granulomatous disease involves the chest and probably the spleen.  Degenerative changes are seen throughout the imaged spine.  Vertebral compression fractures are noted at approximately C6, T4, T5, T10 and L1.  Probably the greatest degree of compression deformity is at T10 and is estimated at 70 percent or greater.  50 percent or greater compression deformity is seen at C6.  The compression fractures at T4 and T5 are 30-50 percent in degree, slightly greater at T5.  The compression fracture at L1 is probably 50 percent or greater in degree.  These findings are thought more likely to be chronic in nature rather than acute-or-subacute.  Probably the greatest degree of posterior wall retropulsion is associated with the T10 and the L1 fractures and is estimated at 4 mm at T10 and probably 4 mm or less at L1.  No definite acute findings are seen in the partially imaged upper abdomen.  There may be some degree of anasarca.  A right axillary cystic mass is seen within the right axillary region.  It has a CT number measuring about 9 Hounsfield units, as on image 37 of series 2, image 135 of series 5, image 75 of series 4, and adjacent images.  This finding measures approximately 2.9 x 2.1 x 2.7 cm in long-axis axial, short-axis axial, and craniocaudal extent, respectively,.  It may represent a seroma.  A necrotic or suppurative lymph node cannot be excluded but is thought to be less likely.  A vascular abnormality is thought to be less likely, as well.  No retained foreign body is seen in this region.  No ectopic gas collections are identified.      Impression:   1. There is a very large right-sided pleural effusion, which is probably loculated.  There is associated  deformity of the right lung, which may represent atelectasis.  Pneumonia cannot be excluded.  2. A small-to-moderate-sized probably loculated left pleural effusion is seen also.  3. Extensive atherosclerotic changes are noted.  4. There may be anasarca.  5. No cardiac enlargement.  6. There may be prominent reactive mediastinal lymph nodes.  7. There is a nearly 3 cm cystic mass involving the right axilla which may represent some type of benign cyst, such as a seroma.  Suppurative or necrotic adenopathy cannot be excluded but is (are) probably less likely.  8. There are several vertebral compression fractures, which are age-indeterminate but thought most likely to be chronic in nature, including, but not necessarily limited to, C6, T4, T5, T10, and L1.  9. Please see above comments for further detail   1.   Please note that portions of this note were completed with a voice recognition program.  ADIS GRAVES JR, MD       Electronically Signed and Approved By: ADIS GRAVES JR, MD on 7/22/2023 at 0:32                 Assessment & Plan   Assessment / Plan     Active Hospital Problems:  Active Hospital Problems    Diagnosis     **PNA (pneumonia)     Pleural effusion     COPD with acute exacerbation     Hypoxia     Severe malnutrition          Impression:  Large right-sided pleural effusion, possibly loculated  Small left-sided pleural effusion  Mediastinal lymphadenopathy, likely reactive  Anasarca  Chronic smoker  COPD  Possible pneumonia    Plan:  Check a sputum culture.  Check procalcitonin.  Start Brovana and Pulmicort.  Continue with DuoNeb 4 times daily.  Continue supplemental oxygen to keep saturations more than 90%.  Currently on Solu-Medrol 40 mg every 8 hours.  Discussed thoracentesis at bedside.  Risk and benefits of the procedure was discussed in detail.  Alternatives and options were reviewed.  Risks including pneumothorax, pneumonia, bleeding, respiratory depression and that it could be fatal were  all reviewed.  Patient understands and is agreeable for the procedure  We will proceed with thoracentesis today.    Reviewed labs, imaging and provider notes.  Discussed with bedside nurse and primary service.  Thank you for allowing me to participate in care of Ms. Sylvester.  I will follow along.    Addendum: Patient refused thoracentesis and wants to think about it and discuss with her daughter.    Electronically signed by Herminio Munoz MD, 7/24/2023, 15:58 EDT.

## 2023-07-24 NOTE — PROGRESS NOTES
[facility]     Progress Note    Patient Name: Sandra Sylvester  : 1945  MRN: [id number]  Primary Care Physician:  René Garrett MD  Date of admission: 2023      Subjective   Brief summary.  Patient admitted with pneumonia.      HPI:  Continues to have shortness of breath with minimal exertion.  CT showed large pleural effusion.  No fever chills    Review of Systems     Complains of back pain, fatigue, shortness of breath, body aches      Objective     Vitals:   Temp:  [96 øF (35.6 øC)-98.4 øF (36.9 øC)] 98.1 øF (36.7 øC)  Heart Rate:  [75-93] 93  Resp:  [18] 18  BP: (123-185)/(55-86) 164/86  Flow (L/min):  [2] 2    Physical Exam :     Elderly female cachectic and thinly built.  Heart regular, lungs diminished breath sounds bilaterally no rhonchi today.  Abdomen soft extremities no edema      Result Review:  I have personally reviewed the results from the time of this admission to 2023 15:19 EDT and agree with these findings:  [x]  Laboratory  []  Microbiology  []  Radiology  []  EKG/Telemetry   []  Cardiology/Vascular   []  Pathology  []  Old records  []  Other:           Assessment / Plan       Active Hospital Problems:  Active Hospital Problems    Diagnosis     **PNA (pneumonia)     Pleural effusion     COPD with acute exacerbation     Hypoxia     Severe malnutrition        Plan:   Continue antibiotic for pneumonia, pulmonary consult for pleural tap.  Patient has large pleural effusion and lung collapse due to effusion, will benefit from pleural tap..       DVT prophylaxis:  Medical DVT prophylaxis orders are present.    CODE STATUS:   Level Of Support Discussed With: Patient  Code Status (Patient has no pulse and is not breathing): CPR (Attempt to Resuscitate)  Medical Interventions (Patient has pulse or is breathing): Full Support  Release to patient: Routine Release            Electronically signed by René Garrett MD, 23, 3:18 PM EDT.

## 2023-07-24 NOTE — PLAN OF CARE
Goal Outcome Evaluation:  Plan of Care Reviewed With: patient, daughter        Progress: no change  Outcome Evaluation: PT is AAOx3-4 with intermittent forgetfulness. VSS, BP slightly elevated. Remains on 2L/NC and is SOA with exertion. x1 assist with cane to BR. Compliant with tx plan. Consulted by Dr. Munoz, recommended thoracentesis. PT wants to talk to daughterMelissa tomorrow and decided if she (patient) wants to have it done. Dr. Munoz spoke to daughterMelissa and explained procedure, risks and benefits. Consent form has not been signed yet, pending patient talking it over with her daughter. If the patient decides to have the procedure done, the nurse needs to notify Dr. Munoz and he verbalized he would do lokesh Daniels, 7/25. Appetite is poor. Tolerated IV ABX. Medicated x1 with Zofran for intermittent nausea with relief verbalized. No acute events this shift. Continue to monitor with current POC.

## 2023-07-25 ENCOUNTER — APPOINTMENT (OUTPATIENT)
Dept: GENERAL RADIOLOGY | Facility: HOSPITAL | Age: 78
DRG: 193 | End: 2023-07-25
Payer: MEDICARE

## 2023-07-25 PROBLEM — F23: Status: ACTIVE | Noted: 2023-07-25

## 2023-07-25 LAB
ALBUMIN FLD-MCNC: 1.8 G/DL
APPEARANCE FLD: ABNORMAL
ARTERIAL PATENCY WRIST A: POSITIVE
BASE EXCESS BLDA CALC-SCNC: 3.3 MMOL/L (ref -2–2)
BDY SITE: ABNORMAL
COHGB MFR BLD: 0.3 % (ref 0–1.5)
COLOR FLD: ABNORMAL
FHHB: 5 % (ref 0–5)
GAS FLOW AIRWAY: 2 LPM
GLUCOSE FLD-MCNC: 141 MG/DL
HCO3 BLDA-SCNC: 27.3 MMOL/L (ref 22–26)
HGB BLDA-MCNC: 13.7 G/DL (ref 11.7–14.6)
INHALED O2 CONCENTRATION: 28 %
LACTATE BLDA-SCNC: ABNORMAL MMOL/L
LDH FLD-CCNC: 195 U/L
LYMPHOCYTES NFR FLD MANUAL: 54 %
MACROPHAGE FLUID: 24 %
MESOTHL CELL NFR FLD MANUAL: 4 %
METHGB BLD QL: 0.2 % (ref 0–1.5)
MODALITY: ABNORMAL
MONOCYTES NFR FLD: 5 %
NEUTROPHILS NFR FLD MANUAL: 13 %
NUC CELL # FLD: 310 /MM3
OXYHGB MFR BLDV: 94.5 % (ref 94–99)
PCO2 BLDA: 39.7 MM HG (ref 35–45)
PH BLDA: 7.46 PH UNITS (ref 7.35–7.45)
PH FLD: 7.5 [PH]
PO2 BLD: 284 MM[HG] (ref 0–500)
PO2 BLDA: 79.5 MM HG (ref 80–100)
PROT FLD-MCNC: 2.9 G/DL
RBC # FLD AUTO: 5000 /MM3
SAO2 % BLDCOA: 95 % (ref 95–99)

## 2023-07-25 PROCEDURE — 99233 SBSQ HOSP IP/OBS HIGH 50: CPT | Performed by: INTERNAL MEDICINE

## 2023-07-25 PROCEDURE — 83050 HGB METHEMOGLOBIN QUAN: CPT | Performed by: INTERNAL MEDICINE

## 2023-07-25 PROCEDURE — 76604 US EXAM CHEST: CPT | Performed by: INTERNAL MEDICINE

## 2023-07-25 PROCEDURE — 25010000002 CEFTRIAXONE PER 250 MG: Performed by: INTERNAL MEDICINE

## 2023-07-25 PROCEDURE — 32551 INSERTION OF CHEST TUBE: CPT | Performed by: INTERNAL MEDICINE

## 2023-07-25 PROCEDURE — 84157 ASSAY OF PROTEIN OTHER: CPT | Performed by: INTERNAL MEDICINE

## 2023-07-25 PROCEDURE — 36600 WITHDRAWAL OF ARTERIAL BLOOD: CPT | Performed by: INTERNAL MEDICINE

## 2023-07-25 PROCEDURE — 83615 LACTATE (LD) (LDH) ENZYME: CPT | Performed by: INTERNAL MEDICINE

## 2023-07-25 PROCEDURE — 25010000002 PROCHLORPERAZINE 10 MG/2ML SOLUTION: Performed by: INTERNAL MEDICINE

## 2023-07-25 PROCEDURE — 88108 CYTOPATH CONCENTRATE TECH: CPT | Performed by: INTERNAL MEDICINE

## 2023-07-25 PROCEDURE — 32561 LYSE CHEST FIBRIN INIT DAY: CPT | Performed by: INTERNAL MEDICINE

## 2023-07-25 PROCEDURE — 82375 ASSAY CARBOXYHB QUANT: CPT | Performed by: INTERNAL MEDICINE

## 2023-07-25 PROCEDURE — 83986 ASSAY PH BODY FLUID NOS: CPT | Performed by: INTERNAL MEDICINE

## 2023-07-25 PROCEDURE — 87070 CULTURE OTHR SPECIMN AEROBIC: CPT | Performed by: INTERNAL MEDICINE

## 2023-07-25 PROCEDURE — 25010000002 METHYLPREDNISOLONE PER 40 MG: Performed by: INTERNAL MEDICINE

## 2023-07-25 PROCEDURE — 71045 X-RAY EXAM CHEST 1 VIEW: CPT

## 2023-07-25 PROCEDURE — 87205 SMEAR GRAM STAIN: CPT | Performed by: INTERNAL MEDICINE

## 2023-07-25 PROCEDURE — 87102 FUNGUS ISOLATION CULTURE: CPT | Performed by: INTERNAL MEDICINE

## 2023-07-25 PROCEDURE — 82042 OTHER SOURCE ALBUMIN QUAN EA: CPT | Performed by: INTERNAL MEDICINE

## 2023-07-25 PROCEDURE — 0W9930Z DRAINAGE OF RIGHT PLEURAL CAVITY WITH DRAINAGE DEVICE, PERCUTANEOUS APPROACH: ICD-10-PCS | Performed by: INTERNAL MEDICINE

## 2023-07-25 PROCEDURE — 25010000002 HALOPERIDOL LACTATE PER 5 MG: Performed by: INTERNAL MEDICINE

## 2023-07-25 PROCEDURE — 87206 SMEAR FLUORESCENT/ACID STAI: CPT | Performed by: INTERNAL MEDICINE

## 2023-07-25 PROCEDURE — 87075 CULTR BACTERIA EXCEPT BLOOD: CPT | Performed by: INTERNAL MEDICINE

## 2023-07-25 PROCEDURE — 25010000002 HYDROMORPHONE 1 MG/ML SOLUTION: Performed by: INTERNAL MEDICINE

## 2023-07-25 PROCEDURE — 87116 MYCOBACTERIA CULTURE: CPT | Performed by: INTERNAL MEDICINE

## 2023-07-25 PROCEDURE — 94799 UNLISTED PULMONARY SVC/PX: CPT

## 2023-07-25 PROCEDURE — 89051 BODY FLUID CELL COUNT: CPT | Performed by: INTERNAL MEDICINE

## 2023-07-25 PROCEDURE — 32555 ASPIRATE PLEURA W/ IMAGING: CPT | Performed by: INTERNAL MEDICINE

## 2023-07-25 PROCEDURE — 0W993ZZ DRAINAGE OF RIGHT PLEURAL CAVITY, PERCUTANEOUS APPROACH: ICD-10-PCS | Performed by: INTERNAL MEDICINE

## 2023-07-25 PROCEDURE — 25010000002 ENOXAPARIN PER 10 MG: Performed by: INTERNAL MEDICINE

## 2023-07-25 PROCEDURE — 82805 BLOOD GASES W/O2 SATURATION: CPT | Performed by: INTERNAL MEDICINE

## 2023-07-25 PROCEDURE — 82945 GLUCOSE OTHER FLUID: CPT | Performed by: INTERNAL MEDICINE

## 2023-07-25 RX ORDER — HALOPERIDOL 5 MG/ML
3 INJECTION INTRAMUSCULAR ONCE
Status: COMPLETED | OUTPATIENT
Start: 2023-07-25 | End: 2023-07-25

## 2023-07-25 RX ORDER — QUETIAPINE FUMARATE 25 MG/1
25 TABLET, FILM COATED ORAL NIGHTLY
Status: DISCONTINUED | OUTPATIENT
Start: 2023-07-25 | End: 2023-08-01 | Stop reason: HOSPADM

## 2023-07-25 RX ADMIN — Medication 10 ML: at 21:21

## 2023-07-25 RX ADMIN — HALOPERIDOL LACTATE 3 MG: 5 INJECTION, SOLUTION INTRAMUSCULAR at 05:50

## 2023-07-25 RX ADMIN — Medication 1 TABLET: at 08:38

## 2023-07-25 RX ADMIN — PROCHLORPERAZINE EDISYLATE 5 MG: 5 INJECTION, SOLUTION INTRAMUSCULAR; INTRAVENOUS at 21:20

## 2023-07-25 RX ADMIN — TRAZODONE HYDROCHLORIDE 50 MG: 50 TABLET ORAL at 21:20

## 2023-07-25 RX ADMIN — METHYLPREDNISOLONE SODIUM SUCCINATE 40 MG: 40 INJECTION INTRAMUSCULAR; INTRAVENOUS at 10:36

## 2023-07-25 RX ADMIN — HYDROCODONE BITARTRATE AND ACETAMINOPHEN 1 TABLET: 5; 325 TABLET ORAL at 00:04

## 2023-07-25 RX ADMIN — CEFTRIAXONE SODIUM 1000 MG: 1 INJECTION, SOLUTION INTRAVENOUS at 08:39

## 2023-07-25 RX ADMIN — METHYLPREDNISOLONE SODIUM SUCCINATE 40 MG: 40 INJECTION INTRAMUSCULAR; INTRAVENOUS at 17:59

## 2023-07-25 RX ADMIN — LISINOPRIL 20 MG: 20 TABLET ORAL at 08:37

## 2023-07-25 RX ADMIN — HYDROCODONE BITARTRATE AND ACETAMINOPHEN 1 TABLET: 5; 325 TABLET ORAL at 04:41

## 2023-07-25 RX ADMIN — HYDROMORPHONE HYDROCHLORIDE 0.5 MG: 1 INJECTION, SOLUTION INTRAMUSCULAR; INTRAVENOUS; SUBCUTANEOUS at 21:21

## 2023-07-25 RX ADMIN — HYDROCODONE BITARTRATE AND ACETAMINOPHEN 1 TABLET: 5; 325 TABLET ORAL at 16:30

## 2023-07-25 RX ADMIN — QUETIAPINE FUMARATE 25 MG: 25 TABLET ORAL at 21:20

## 2023-07-25 RX ADMIN — FAMOTIDINE 20 MG: 20 TABLET ORAL at 08:37

## 2023-07-25 RX ADMIN — METOPROLOL SUCCINATE 25 MG: 25 TABLET, EXTENDED RELEASE ORAL at 08:38

## 2023-07-25 RX ADMIN — Medication 10 ML: at 08:50

## 2023-07-25 RX ADMIN — HYDROMORPHONE HYDROCHLORIDE 0.5 MG: 1 INJECTION, SOLUTION INTRAMUSCULAR; INTRAVENOUS; SUBCUTANEOUS at 17:52

## 2023-07-25 RX ADMIN — ENOXAPARIN SODIUM 30 MG: 100 INJECTION SUBCUTANEOUS at 08:38

## 2023-07-25 RX ADMIN — METHYLPREDNISOLONE SODIUM SUCCINATE 40 MG: 40 INJECTION INTRAMUSCULAR; INTRAVENOUS at 03:30

## 2023-07-25 NOTE — PROGRESS NOTES
Pulmonary / Critical Care Progress Note      Patient Name: Sandra Sylvester  : 1945  MRN: [id number]  Primary Care Physician:  René Garrett MD  Date of admission: 2023    Subjective   Subjective   Follow-up for pleural effusion, large right-sided pleural effusion and small left-sided pleural effusion.    Over past 24 hours: Discussed thoracentesis, patient however was very anxious and later on declined the procedure.  She wanted to discuss with her daughter.    Overnight, patient got confused and has safety watch now.    This morning,   Patient is confused, safety watch is at bedside.  She remains on oxygen.  Does not complain of significant pain anywhere.  Not able to provide other history.  Her daughter is supposed to be at bedside later this afternoon.  Remains on ceftriaxone.  Also on Solu-Medrol 40 mg every 8 hours.    Review of Systems  Could not be obtained, patient not optimally responsive.      Objective   Objective     Vitals:   Temp:  [97.9 øF (36.6 øC)-98.6 øF (37 øC)] 98.1 øF (36.7 øC)  Heart Rate:  [75-93] 83  Resp:  [18] 18  BP: (140-185)/(62-86) 166/86  Flow (L/min):  [2] 2  Physical Exam   Vital Signs Reviewed   Frail looking female, somnolent, arousable on nasal oxygen, has conversational dyspnea  HEENT:  PERRL, EOMI.  OP, nares clear, no sinus tenderness  Neck:  Supple, no JVD, no thyromegaly  Lymph: no axillary, cervical, supraclavicular lymphadenopathy noted bilaterally  Chest: Poor aeration, dull to percussion right base, no wheezing or rhonchi, some crackles bilaterally  CV: RRR, no MGR, pulses 2+, equal.  Abd:  Soft, NT, ND, + BS, no HSM  EXT:  no clubbing, no cyanosis, no edema, no joint tenderness  Neuro: Somnolent, arousable, confused, CN grossly intact, no focal deficits, generalized weakness+  Skin: No rashes or lesions noted         Result Review    Result Review:  I have personally reviewed the results from the time of this admission to 2023 07:47 EDT and agree with  these findings:  [x]  Laboratory  [x]  Microbiology  [x]  Radiology  [x]  EKG/Telemetry   [x]  Cardiology/Vascular   []  Pathology  []  Old records  []  Other:  Most notable findings include:       Lab 07/22/23  0537 07/20/23  0146 07/19/23  1934 07/19/23  1855   WBC 12.06* 8.93  --  10.09   HEMOGLOBIN 10.7* 12.8  --  13.8   HEMATOCRIT 34.3 38.8  --  42.1   PLATELETS 220 234  --  270   SODIUM 137 137  --  139   SODIUM, ARTERIAL  --   --  135.3*  --    POTASSIUM 4.2 4.1  --  4.7   CHLORIDE 106 100  --  99   CO2 23.6 20.2*  --  24.2   BUN 12 14  --  14   CREATININE 0.44* 0.53*  --  0.72   GLUCOSE 135* 112*  --  107*   GLUCOSE, ARTERIAL  --   --  87  --    CALCIUM 9.2 9.2  --  9.8   TOTAL PROTEIN 5.3*  --   --  6.7   ALBUMIN 2.9*  --   --  3.5   GLOBULIN 2.4  --   --  3.2     CT Chest Without Contrast Diagnostic    Result Date: 7/22/2023  PROCEDURE: CT CHEST WO CONTRAST DIAGNOSTIC  COMPARISONS: 7/19/2023; 1/9/2023.  INDICATIONS: Pneumonia, complication suspected, x-ray done.  TECHNIQUE: 580 CT images were created without the administration of contrast material.   PROTOCOL:   Standard CT imaging protocol performed.    RADIATION:   Total DLP: 102 mGy*cm; total mAs: 1,294.   Automated exposure control was utilized to minimize radiation dose.  FINDINGS: There is a very large right-sided pleural effusion.  It may be loculated.  It has CT numbers ranging between 10 and 14 Hounsfield units.  Atelectasis and/or pneumonia may be present on the right and to a much lesser degree on the left.  There is a small-to-moderate left-sided pleural effusion, which may also be loculated pleural.  It has CT numbers ranging between 0 and 7 Hounsfield units.  Bilateral empyemas, accounting for the findings, cannot be excluded.  Hemothorax cannot be excluded but is thought to be less likely.  Please correlate clinically.  No gas is seen in pleural fluid collections bilaterally.  No definite acute or subacute rib fractures are seen.  Debris is  seen in the upper thoracic esophageal lumen, which is mildly dilated.  Gastroesophageal reflux disease (GERD) would be in the differential diagnosis.  Esophageal dysmotility is possible.  Extensive atherosclerotic changes are present, including involvement of the coronary arteries.  There is involvement of the thoracic aorta and the great arteries.  No aneurysmal dilatation of the thoracic aorta is suggested.  There is a trace amount of pericardial effusion.  No cardiac enlargement.  Chronic calcified granulomatous disease involves the chest and probably the spleen.  Degenerative changes are seen throughout the imaged spine.  Vertebral compression fractures are noted at approximately C6, T4, T5, T10 and L1.  Probably the greatest degree of compression deformity is at T10 and is estimated at 70 percent or greater.  50 percent or greater compression deformity is seen at C6.  The compression fractures at T4 and T5 are 30-50 percent in degree, slightly greater at T5.  The compression fracture at L1 is probably 50 percent or greater in degree.  These findings are thought more likely to be chronic in nature rather than acute-or-subacute.  Probably the greatest degree of posterior wall retropulsion is associated with the T10 and the L1 fractures and is estimated at 4 mm at T10 and probably 4 mm or less at L1.  No definite acute findings are seen in the partially imaged upper abdomen.  There may be some degree of anasarca.  A right axillary cystic mass is seen within the right axillary region.  It has a CT number measuring about 9 Hounsfield units, as on image 37 of series 2, image 135 of series 5, image 75 of series 4, and adjacent images.  This finding measures approximately 2.9 x 2.1 x 2.7 cm in long-axis axial, short-axis axial, and craniocaudal extent, respectively,.  It may represent a seroma.  A necrotic or suppurative lymph node cannot be excluded but is thought to be less likely.  A vascular abnormality is thought  to be less likely, as well.  No retained foreign body is seen in this region.  No ectopic gas collections are identified.      Impression:   1. There is a very large right-sided pleural effusion, which is probably loculated.  There is associated deformity of the right lung, which may represent atelectasis.  Pneumonia cannot be excluded.  2. A small-to-moderate-sized probably loculated left pleural effusion is seen also.  3. Extensive atherosclerotic changes are noted.  4. There may be anasarca.  5. No cardiac enlargement.  6. There may be prominent reactive mediastinal lymph nodes.  7. There is a nearly 3 cm cystic mass involving the right axilla which may represent some type of benign cyst, such as a seroma.  Suppurative or necrotic adenopathy cannot be excluded but is (are) probably less likely.  8. There are several vertebral compression fractures, which are age-indeterminate but thought most likely to be chronic in nature, including, but not necessarily limited to, C6, T4, T5, T10, and L1.  9. Please see above comments for further detail   1.   Please note that portions of this note were completed with a voice recognition program.  ADIS GRAVES JR, MD       Electronically Signed and Approved By: ADIS GRAVES JR, MD on 7/22/2023 at 0:32                 Assessment & Plan   Assessment / Plan     Active Hospital Problems:  Active Hospital Problems    Diagnosis     **PNA (pneumonia)     Pleural effusion     COPD with acute exacerbation     Hypoxia     Severe malnutrition          Impression:   Large right-sided pleural effusion, possibly loculated  Small left-sided pleural effusion  Mediastinal lymphadenopathy, likely reactive  Anasarca  Chronic smoker  COPD  Possible pneumonia  Altered mental status    Plan:   Check a sputum culture.  Check procalcitonin.  Continue Brovana and Pulmicort.  Continue with DuoNeb 4 times daily.  Continue supplemental oxygen to keep saturations more than 90%.  Currently on  Solu-Medrol 40 mg every 8 hours.  Discussed thoracentesis at bedside with her yesterday.  Risk and benefits of the procedure was discussed in detail.  Alternatives and options were reviewed.  Risks including pneumothorax, pneumonia, bleeding, respiratory depression and that it could be fatal were all reviewed.  Patient is confused today.  Her daughter is going to be at bedside.  If we have consent for thoracentesis/chest tube,  we will consider thoracentesis today  Continue with IV antibiotics.       DVT prophylaxis:  Medical DVT prophylaxis orders are present.    CODE STATUS:   Level Of Support Discussed With: Patient  Code Status (Patient has no pulse and is not breathing): CPR (Attempt to Resuscitate)  Medical Interventions (Patient has pulse or is breathing): Full Support  Release to patient: Routine Release      I personally reviewed pertinent labs, imaging and provider notes. Discussed with bedside nurse and will discuss with primary service.       Electronically signed by Herminio Munoz MD, 7/25/2023, 07:47 EDT.

## 2023-07-25 NOTE — PROCEDURES
Thoracentesis Procedure Note    Indication: Moderate right sided pleural effusion    Consent: Yes, placed in chart.    Procedure: The patient was placed in the sitting position and the appropriate landmarks were identified. The skin over the puncture site in the right posterior chest wall was prepped with ChloraPrep and draped in sterile fashion. Local anesthesia was applied with 1% lidocaine. Thoracentesis catheter was inserted with needle guidance. Pleural fluid was turbid yellow, drained 1200 mL fluid. The catheter was then withdrawn and a sterile dressing was placed over the site.  Decision was made to put chest tube with loculated pleural effusion and turbid color of the pleural fluid.  A post-procedure film is pending at this time.    The patient tolerated the procedure well.    Complications: None    Electronically signed by Herminio Munoz MD, 07/25/23, 6:02 PM EDT.

## 2023-07-25 NOTE — PROGRESS NOTES
[facility]     Progress Note    Patient Name: Sandra Sylvester  : 1945  MRN: [id number]  Primary Care Physician:  René Garrett MD  Date of admission: 2023      Subjective   Brief summary.  Patient admitted with pneumonia.      HPI:  Patient admitted with pneumonia and pleural effusion.  Short of breath with minimal exertion.  Confused since last night.  No agitation.  Nursing staff concerned and wanted ABGs to be done.  ABG was done, no CO2 retention.  Daughter at bedside.    Review of Systems     Complains of back pain, fatigue, shortness of breath, body aches  Pleasantly confused at times according to daughter.        Objective     Vitals:   Temp:  [97.3 øF (36.3 øC)-98.6 øF (37 øC)] 97.5 øF (36.4 øC)  Heart Rate:  [75-88] 76  Resp:  [18] 18  BP: (134-172)/(76-86) 134/82  Flow (L/min):  [2] 2    Physical Exam :     Elderly female cachectic and thinly built.  Heart regular, lungs diminished breath sounds bilaterally no rhonchi today.  Abdomen soft.  Neurologically awake alert and oriented.   extremities no edema      Result Review:  I have personally reviewed the results from the time of this admission to 2023 16:22 EDT and agree with these findings:  [x]  Laboratory  []  Microbiology  []  Radiology  []  EKG/Telemetry   []  Cardiology/Vascular   []  Pathology  []  Old records  []  Other:           Assessment / Plan       Active Hospital Problems:  Active Hospital Problems    Diagnosis     **PNA (pneumonia)     Hospital psychosis     Pleural effusion     COPD with acute exacerbation     Hypoxia     Severe malnutrition        Plan:   For pleural tap today.  Discussed with patient and daughter at bedside.  Prefers to go home once she is stable.  Refusing nursing home.  Continue current treatment for now.  Add Seroquel for patient's confusion at night.  Monitor lab       DVT prophylaxis:  Medical DVT prophylaxis orders are present.    CODE STATUS:   Level Of Support Discussed With:  Patient  Code Status (Patient has no pulse and is not breathing): CPR (Attempt to Resuscitate)  Medical Interventions (Patient has pulse or is breathing): Full Support  Release to patient: Routine Release              Electronically signed by René Garrett MD, 07/25/23, 4:23 PM EDT.

## 2023-07-25 NOTE — PROCEDURES
Chest Tube Placement Procedure Note    Indication: Right sided loculated pleural effusion, turbid effusion    Consent obtained: Yes, verbal from patient.    Time Out: performed at bedside.    Pre-Medication: Local lidocaine.    Procedure: The patient was placed in a supine position.  The right chest was prepped with chlorprep and draped.  Local anesthesia over the insertion site was applied with 1% lidocaine.  An incision was made in the 7th rib space along the mid scapular line from where the pleural fluid was removed. Blunt dissection up and over the rib was performed until access was obtained into the pleural cavity.  A 14 Amharic chest tube was placed and connected to pleurovac.  Initial output from the tube was reddish and serosanguinous fluid, drained to 50 cc fluid.  The tube was sutured in place at the skin and the site was covered with an occlusive dressing.  A chest x-ray was obtained to evaluate placement which is pending at this time.    The patient tolerated the procedure well.    Complications: None

## 2023-07-25 NOTE — PLAN OF CARE
Goal Outcome Evaluation:      Pt is A&O x4 with intermittent confusion and forgetfulness at the start of shift.  She was irritable, had not slept all night and kept getting out of the bed. States she wants to go home immediately and wants to leave the hospital. Gets irritable when redirected. Complained of pain around the inner left thigh which has been ongoing, before patient came to the hospital. [place] given as par MAR. Spoke to the daughter over the phone and daughter indicated she would visit the hospital around 11.30 am.      Pt became angry and combative to staff. Security had to be called. She continued to jump out of bed, turn off the bed alarm, try to cut off her nasal canula and made her way to the hallway. She insisted she was going home and leaving the hospital. Dr Ron was contacted and he ordered IM Haldol for the patient which was administered. Security was called when patient became combative to staff.Pt continued to insist that she would leave the hospital. She was confused and it was difficult for her to follow directions. She became suspicious of staff when they were performing care. Con

## 2023-07-25 NOTE — PROCEDURES
Intrapleural tPA dornase instillation first time:    Patient with loculated turbid colored pleural effusion.     Under aseptic precautions, chest tube was flushed, and tPA and dornase instilled through the three way stop cock.  Chest tube clamped to the chest.  Will open to drain and suction in an hour.    No complications of the procedure.

## 2023-07-26 ENCOUNTER — APPOINTMENT (OUTPATIENT)
Dept: GENERAL RADIOLOGY | Facility: HOSPITAL | Age: 78
DRG: 193 | End: 2023-07-26
Payer: MEDICARE

## 2023-07-26 LAB
ALBUMIN SERPL-MCNC: 3.2 G/DL (ref 3.5–5.2)
ANION GAP SERPL CALCULATED.3IONS-SCNC: 11.4 MMOL/L (ref 5–15)
BASOPHILS # BLD AUTO: 0.03 10*3/MM3 (ref 0–0.2)
BASOPHILS NFR BLD AUTO: 0.2 % (ref 0–1.5)
BUN SERPL-MCNC: 19 MG/DL (ref 8–23)
BUN/CREAT SERPL: 41.3 (ref 7–25)
CALCIUM SPEC-SCNC: 8.9 MG/DL (ref 8.6–10.5)
CHLORIDE SERPL-SCNC: 95 MMOL/L (ref 98–107)
CO2 SERPL-SCNC: 23.6 MMOL/L (ref 22–29)
CREAT SERPL-MCNC: 0.46 MG/DL (ref 0.57–1)
DEPRECATED RDW RBC AUTO: 60.6 FL (ref 37–54)
EGFRCR SERPLBLD CKD-EPI 2021: 96.9 ML/MIN/1.73
EOSINOPHIL # BLD AUTO: 0.02 10*3/MM3 (ref 0–0.4)
EOSINOPHIL NFR BLD AUTO: 0.1 % (ref 0.3–6.2)
ERYTHROCYTE [DISTWIDTH] IN BLOOD BY AUTOMATED COUNT: 16.4 % (ref 12.3–15.4)
GLUCOSE SERPL-MCNC: 125 MG/DL (ref 65–99)
HCT VFR BLD AUTO: 43.6 % (ref 34–46.6)
HGB BLD-MCNC: 13.7 G/DL (ref 12–15.9)
IMM GRANULOCYTES # BLD AUTO: 0.12 10*3/MM3 (ref 0–0.05)
IMM GRANULOCYTES NFR BLD AUTO: 0.8 % (ref 0–0.5)
LYMPHOCYTES # BLD AUTO: 0.29 10*3/MM3 (ref 0.7–3.1)
LYMPHOCYTES NFR BLD AUTO: 2 % (ref 19.6–45.3)
MAGNESIUM SERPL-MCNC: 2.4 MG/DL (ref 1.6–2.4)
MCH RBC QN AUTO: 31 PG (ref 26.6–33)
MCHC RBC AUTO-ENTMCNC: 31.4 G/DL (ref 31.5–35.7)
MCV RBC AUTO: 98.6 FL (ref 79–97)
MONOCYTES # BLD AUTO: 0.66 10*3/MM3 (ref 0.1–0.9)
MONOCYTES NFR BLD AUTO: 4.6 % (ref 5–12)
NEUTROPHILS NFR BLD AUTO: 13.38 10*3/MM3 (ref 1.7–7)
NEUTROPHILS NFR BLD AUTO: 92.3 % (ref 42.7–76)
NRBC BLD AUTO-RTO: 0 /100 WBC (ref 0–0.2)
PHOSPHATE SERPL-MCNC: 3.7 MG/DL (ref 2.5–4.5)
PLATELET # BLD AUTO: 254 10*3/MM3 (ref 140–450)
PMV BLD AUTO: 9.2 FL (ref 6–12)
POTASSIUM SERPL-SCNC: 4.7 MMOL/L (ref 3.5–5.2)
PROCALCITONIN SERPL-MCNC: 0.03 NG/ML (ref 0–0.25)
RBC # BLD AUTO: 4.42 10*6/MM3 (ref 3.77–5.28)
SODIUM SERPL-SCNC: 130 MMOL/L (ref 136–145)
WBC NRBC COR # BLD: 14.5 10*3/MM3 (ref 3.4–10.8)

## 2023-07-26 PROCEDURE — 83735 ASSAY OF MAGNESIUM: CPT | Performed by: INTERNAL MEDICINE

## 2023-07-26 PROCEDURE — 80069 RENAL FUNCTION PANEL: CPT | Performed by: INTERNAL MEDICINE

## 2023-07-26 PROCEDURE — 25010000002 CEFTRIAXONE PER 250 MG: Performed by: INTERNAL MEDICINE

## 2023-07-26 PROCEDURE — 25010000002 ENOXAPARIN PER 10 MG: Performed by: INTERNAL MEDICINE

## 2023-07-26 PROCEDURE — 3E0L3GC INTRODUCTION OF OTHER THERAPEUTIC SUBSTANCE INTO PLEURAL CAVITY, PERCUTANEOUS APPROACH: ICD-10-PCS | Performed by: INTERNAL MEDICINE

## 2023-07-26 PROCEDURE — 71045 X-RAY EXAM CHEST 1 VIEW: CPT

## 2023-07-26 PROCEDURE — 85025 COMPLETE CBC W/AUTO DIFF WBC: CPT | Performed by: INTERNAL MEDICINE

## 2023-07-26 PROCEDURE — 25010000002 METHYLPREDNISOLONE PER 40 MG: Performed by: INTERNAL MEDICINE

## 2023-07-26 PROCEDURE — 99233 SBSQ HOSP IP/OBS HIGH 50: CPT | Performed by: INTERNAL MEDICINE

## 2023-07-26 PROCEDURE — 84145 PROCALCITONIN (PCT): CPT | Performed by: INTERNAL MEDICINE

## 2023-07-26 PROCEDURE — 25010000002 ALTEPLASE 2 MG RECONSTITUTED SOLUTION: Performed by: INTERNAL MEDICINE

## 2023-07-26 RX ORDER — HYDROCODONE BITARTRATE AND ACETAMINOPHEN 10; 325 MG/1; MG/1
1 TABLET ORAL EVERY 4 HOURS PRN
Status: DISCONTINUED | OUTPATIENT
Start: 2023-07-26 | End: 2023-08-01 | Stop reason: HOSPADM

## 2023-07-26 RX ORDER — IPRATROPIUM BROMIDE AND ALBUTEROL SULFATE 2.5; .5 MG/3ML; MG/3ML
3 SOLUTION RESPIRATORY (INHALATION) EVERY 6 HOURS PRN
Status: DISCONTINUED | OUTPATIENT
Start: 2023-07-26 | End: 2023-08-01 | Stop reason: HOSPADM

## 2023-07-26 RX ADMIN — FAMOTIDINE 20 MG: 20 TABLET ORAL at 09:21

## 2023-07-26 RX ADMIN — CEFTRIAXONE SODIUM 1000 MG: 1 INJECTION, SOLUTION INTRAVENOUS at 09:22

## 2023-07-26 RX ADMIN — HYDROCODONE BITARTRATE AND ACETAMINOPHEN 1 TABLET: 5; 325 TABLET ORAL at 17:16

## 2023-07-26 RX ADMIN — HYDROCODONE BITARTRATE AND ACETAMINOPHEN 1 TABLET: 5; 325 TABLET ORAL at 05:48

## 2023-07-26 RX ADMIN — TRAZODONE HYDROCHLORIDE 50 MG: 50 TABLET ORAL at 19:51

## 2023-07-26 RX ADMIN — ALTEPLASE 10 MG: 2.2 INJECTION, POWDER, LYOPHILIZED, FOR SOLUTION INTRAVENOUS at 17:09

## 2023-07-26 RX ADMIN — Medication 1 TABLET: at 09:21

## 2023-07-26 RX ADMIN — METHYLPREDNISOLONE SODIUM SUCCINATE 40 MG: 40 INJECTION INTRAMUSCULAR; INTRAVENOUS at 17:13

## 2023-07-26 RX ADMIN — METHYLPREDNISOLONE SODIUM SUCCINATE 40 MG: 40 INJECTION INTRAMUSCULAR; INTRAVENOUS at 09:24

## 2023-07-26 RX ADMIN — SODIUM CHLORIDE 40 ML: 9 INJECTION, SOLUTION INTRAVENOUS at 09:22

## 2023-07-26 RX ADMIN — ATORVASTATIN CALCIUM 20 MG: 20 TABLET, FILM COATED ORAL at 19:51

## 2023-07-26 RX ADMIN — HYDROCODONE BITARTRATE AND ACETAMINOPHEN 1 TABLET: 10; 325 TABLET ORAL at 22:58

## 2023-07-26 RX ADMIN — Medication 10 ML: at 09:22

## 2023-07-26 RX ADMIN — LISINOPRIL 20 MG: 20 TABLET ORAL at 09:21

## 2023-07-26 RX ADMIN — HYDROCODONE BITARTRATE AND ACETAMINOPHEN 1 TABLET: 5; 325 TABLET ORAL at 11:19

## 2023-07-26 RX ADMIN — METOPROLOL SUCCINATE 25 MG: 25 TABLET, EXTENDED RELEASE ORAL at 09:21

## 2023-07-26 RX ADMIN — METHYLPREDNISOLONE SODIUM SUCCINATE 40 MG: 40 INJECTION INTRAMUSCULAR; INTRAVENOUS at 02:30

## 2023-07-26 RX ADMIN — Medication 10 ML: at 19:51

## 2023-07-26 RX ADMIN — SENNOSIDES AND DOCUSATE SODIUM 2 TABLET: 50; 8.6 TABLET ORAL at 19:51

## 2023-07-26 RX ADMIN — ALTEPLASE 10 MG: 2.2 INJECTION, POWDER, LYOPHILIZED, FOR SOLUTION INTRAVENOUS at 08:01

## 2023-07-26 RX ADMIN — DORNASE ALFA 5 MG: 1 SOLUTION RESPIRATORY (INHALATION) at 17:09

## 2023-07-26 RX ADMIN — ENOXAPARIN SODIUM 30 MG: 100 INJECTION SUBCUTANEOUS at 09:22

## 2023-07-26 RX ADMIN — DORNASE ALFA 5 MG: 1 SOLUTION RESPIRATORY (INHALATION) at 08:01

## 2023-07-26 NOTE — PROGRESS NOTES
[facility]     Progress Note    Patient Name: Sandra Sylvester  : 1945  MRN: [id number]  Primary Care Physician:  René Garrett MD  Date of admission: 2023      Subjective   Brief summary.  Patient admitted with pneumonia.      HPI:  Patient admitted with pneumonia and pleural effusion.  Short of breath with minimal exertion.    Patient's status post chest tube for pleural effusion.  Complains of pain.  Slept better and less confused    Review of Systems     Complains of back pain, fatigue, shortness of breath.  No confusion.  No agitation        Objective     Vitals:   Temp:  [97.3 øF (36.3 øC)-97.9 øF (36.6 øC)] 97.3 øF (36.3 øC)  Heart Rate:  [68-81] 79  Resp:  [16-20] 18  BP: (138-141)/(63-82) 141/65  Flow (L/min):  [2] 2    Physical Exam :     Elderly female cachectic and thinly built.  Heart regular, lungs diminished breath sounds bilaterally no rhonchi today.   Chest tube in place  Abdomen soft nontender.  Neurologically awake alert and oriented.  Extremities no edema.      Result Review:  I have personally reviewed the results from the time of this admission to 2023 14:56 EDT and agree with these findings:  [x]  Laboratory  []  Microbiology  []  Radiology  []  EKG/Telemetry   []  Cardiology/Vascular   []  Pathology  []  Old records  []  Other:           Assessment / Plan       Active Hospital Problems:  Active Hospital Problems    Diagnosis     **PNA (pneumonia)     Hospital psychosis     Pleural effusion     COPD with acute exacerbation     Hypoxia     Severe malnutrition        Plan:   Status post thoracentesis and chest tube placement.  Continue aggressive neb treatments and pulmonary toilet.  Chest tube management per pulmonologist.  Confusion and agitation improved with Seroquel.  Monitor labs.  PT and OT       DVT prophylaxis:  Medical DVT prophylaxis orders are present.    CODE STATUS:   Level Of Support Discussed With: Patient  Code Status (Patient has no pulse and is not  breathing): CPR (Attempt to Resuscitate)  Medical Interventions (Patient has pulse or is breathing): Full Support  Release to patient: Routine Release                Electronically signed by René Garrett MD, 07/26/23, 2:58 PM EDT.

## 2023-07-26 NOTE — PROGRESS NOTES
Pulmonary / Critical Care Progress Note      Patient Name: Sandra Sylvester  : 1945  MRN: [id number]  Primary Care Physician:  René Garrett MD  Date of admission: 2023    Subjective   Subjective   Follow-up for pleural effusion, large right-sided pleural effusion and small left-sided pleural effusion.    Over past 24 hours: Had thoracentesis, was purulent fluid with loculated effusion.  Decision was made to place chest tube.  Chest tube was placed at bedside and 1 dose of tPA dornase was given.  Remained on ceftriaxone IV.  Has been on Solu-Medrol 40 mg every 8 hours    Overnight, no acute events.  Complained of pain around the chest tube site    This morning,   Patient is somnolent, confused, but not agitated.  She remains on oxygen.  Complains of some pain around the chest tube site.  Not able to provide other history.  Chest tube has drained 675 cc over last 24 hours.  Remains on ceftriaxone.  Also on Solu-Medrol 40 mg every 8 hours.    Review of Systems  Could not be obtained, patient not optimally responsive.      Objective   Objective     Vitals:   Temp:  [97.5 øF (36.4 øC)-97.9 øF (36.6 øC)] 97.9 øF (36.6 øC)  Heart Rate:  [68-81] 81  Resp:  [16-20] 20  BP: (134-138)/(63-82) 138/63  Flow (L/min):  [2] 2  Physical Exam   Vital Signs Reviewed   Frail looking female, somnolent, arousable on nasal oxygen, has conversational dyspnea  HEENT:  PERRL, EOMI.  OP, nares clear, no sinus tenderness  Neck:  Supple, no JVD, no thyromegaly  Lymph: no axillary, cervical, supraclavicular lymphadenopathy noted bilaterally  Chest: Poor aeration, dull to percussion right base, no wheezing or rhonchi, some crackles bilaterally, chest tube to suction in place, no significant drainage or discharge around it  CV: RRR, no MGR, pulses 2+, equal.  Abd:  Soft, NT, ND, + BS, no HSM  EXT:  no clubbing, no cyanosis, no edema, no joint tenderness  Neuro: Somnolent, arousable, confused, CN grossly intact, no focal deficits,  generalized weakness+  Skin: No rashes or lesions noted         Result Review    Result Review:  I have personally reviewed the results from the time of this admission to 7/26/2023 07:49 EDT and agree with these findings:  [x]  Laboratory  [x]  Microbiology  [x]  Radiology  [x]  EKG/Telemetry   [x]  Cardiology/Vascular   []  Pathology  []  Old records  []  Other:  Most notable findings include:       Lab 07/22/23  0537 07/20/23  0146 07/19/23  1934 07/19/23  1855   WBC 12.06* 8.93  --  10.09   HEMOGLOBIN 10.7* 12.8  --  13.8   HEMATOCRIT 34.3 38.8  --  42.1   PLATELETS 220 234  --  270   SODIUM 137 137  --  139   SODIUM, ARTERIAL  --   --  135.3*  --    POTASSIUM 4.2 4.1  --  4.7   CHLORIDE 106 100  --  99   CO2 23.6 20.2*  --  24.2   BUN 12 14  --  14   CREATININE 0.44* 0.53*  --  0.72   GLUCOSE 135* 112*  --  107*   GLUCOSE, ARTERIAL  --   --  87  --    CALCIUM 9.2 9.2  --  9.8   TOTAL PROTEIN 5.3*  --   --  6.7   ALBUMIN 2.9*  --   --  3.5   GLOBULIN 2.4  --   --  3.2     CT Chest Without Contrast Diagnostic    Result Date: 7/22/2023  PROCEDURE: CT CHEST WO CONTRAST DIAGNOSTIC  COMPARISONS: 7/19/2023; 1/9/2023.  INDICATIONS: Pneumonia, complication suspected, x-ray done.  TECHNIQUE: 580 CT images were created without the administration of contrast material.   PROTOCOL:   Standard CT imaging protocol performed.    RADIATION:   Total DLP: 102 mGy*cm; total mAs: 1,294.   Automated exposure control was utilized to minimize radiation dose.  FINDINGS: There is a very large right-sided pleural effusion.  It may be loculated.  It has CT numbers ranging between 10 and 14 Hounsfield units.  Atelectasis and/or pneumonia may be present on the right and to a much lesser degree on the left.  There is a small-to-moderate left-sided pleural effusion, which may also be loculated pleural.  It has CT numbers ranging between 0 and 7 Hounsfield units.  Bilateral empyemas, accounting for the findings, cannot be excluded.  Hemothorax  cannot be excluded but is thought to be less likely.  Please correlate clinically.  No gas is seen in pleural fluid collections bilaterally.  No definite acute or subacute rib fractures are seen.  Debris is seen in the upper thoracic esophageal lumen, which is mildly dilated.  Gastroesophageal reflux disease (GERD) would be in the differential diagnosis.  Esophageal dysmotility is possible.  Extensive atherosclerotic changes are present, including involvement of the coronary arteries.  There is involvement of the thoracic aorta and the great arteries.  No aneurysmal dilatation of the thoracic aorta is suggested.  There is a trace amount of pericardial effusion.  No cardiac enlargement.  Chronic calcified granulomatous disease involves the chest and probably the spleen.  Degenerative changes are seen throughout the imaged spine.  Vertebral compression fractures are noted at approximately C6, T4, T5, T10 and L1.  Probably the greatest degree of compression deformity is at T10 and is estimated at 70 percent or greater.  50 percent or greater compression deformity is seen at C6.  The compression fractures at T4 and T5 are 30-50 percent in degree, slightly greater at T5.  The compression fracture at L1 is probably 50 percent or greater in degree.  These findings are thought more likely to be chronic in nature rather than acute-or-subacute.  Probably the greatest degree of posterior wall retropulsion is associated with the T10 and the L1 fractures and is estimated at 4 mm at T10 and probably 4 mm or less at L1.  No definite acute findings are seen in the partially imaged upper abdomen.  There may be some degree of anasarca.  A right axillary cystic mass is seen within the right axillary region.  It has a CT number measuring about 9 Hounsfield units, as on image 37 of series 2, image 135 of series 5, image 75 of series 4, and adjacent images.  This finding measures approximately 2.9 x 2.1 x 2.7 cm in long-axis axial,  short-axis axial, and craniocaudal extent, respectively,.  It may represent a seroma.  A necrotic or suppurative lymph node cannot be excluded but is thought to be less likely.  A vascular abnormality is thought to be less likely, as well.  No retained foreign body is seen in this region.  No ectopic gas collections are identified.      Impression:   1. There is a very large right-sided pleural effusion, which is probably loculated.  There is associated deformity of the right lung, which may represent atelectasis.  Pneumonia cannot be excluded.  2. A small-to-moderate-sized probably loculated left pleural effusion is seen also.  3. Extensive atherosclerotic changes are noted.  4. There may be anasarca.  5. No cardiac enlargement.  6. There may be prominent reactive mediastinal lymph nodes.  7. There is a nearly 3 cm cystic mass involving the right axilla which may represent some type of benign cyst, such as a seroma.  Suppurative or necrotic adenopathy cannot be excluded but is (are) probably less likely.  8. There are several vertebral compression fractures, which are age-indeterminate but thought most likely to be chronic in nature, including, but not necessarily limited to, C6, T4, T5, T10, and L1.  9. Please see above comments for further detail   1.   Please note that portions of this note were completed with a voice recognition program.  ADIS GRAVES JR, MD       Electronically Signed and Approved By: ADIS GRAVES JR, MD on 7/22/2023 at 0:32                 Assessment & Plan   Assessment / Plan     Active Hospital Problems:  Active Hospital Problems    Diagnosis     **PNA (pneumonia)     Hospital psychosis     Pleural effusion     COPD with acute exacerbation     Hypoxia     Severe malnutrition          Impression:   Large right-sided pleural effusion, possibly loculated  Small left-sided pleural effusion  Mediastinal lymphadenopathy, likely reactive  Anasarca  Chronic smoker  COPD  Possible  pneumonia  Altered mental status    Plan:   Check a sputum culture.  Procalcitonin was normal  Continue Brovana and Pulmicort.  Continue with DuoNeb 4 times daily.  Continue supplemental oxygen to keep saturations more than 90%.  Currently on Solu-Medrol 40 mg every 8 hours.  Can likely be switched to oral prednisone tomorrow  Status post chest tube placement.  Had second round of tPA dornase this morning.  Continue with IV antibiotics.  Currently on IV ceftriaxone.  We will consider CT scan of the chest tomorrow.    DVT prophylaxis:  Medical DVT prophylaxis orders are present.    CODE STATUS:   Level Of Support Discussed With: Patient  Code Status (Patient has no pulse and is not breathing): CPR (Attempt to Resuscitate)  Medical Interventions (Patient has pulse or is breathing): Full Support  Release to patient: Routine Release      I personally reviewed pertinent labs, imaging and provider notes. Discussed with bedside nurse and will discuss with primary service.       Electronically signed by Herminio Munoz MD, 7/26/2023, 07:49 EDT.

## 2023-07-26 NOTE — CONSULTS
07/26/23 1417   Coping/Psychosocial   Additional Documentation Spiritual Care (Group)   Spiritual Care   Use of Spiritual Resources spirituality for coping, indicated strong use of   Spiritual Care Source  initiative   Spiritual Care Follow-Up will follow closely   Response to Spiritual Care receptive of support   Spiritual Care Interventions prayer support provided   Spiritual Care Visit Type initial   Spiritual Care Request coping/stress of illness support;spiritual/moral support   Receptivity to Spiritual Care visit welcomed

## 2023-07-26 NOTE — PLAN OF CARE
Goal Outcome Evaluation:  Plan of Care Reviewed With: patient        Progress: improving  Outcome Evaluation: Patient remains alert and oriented x3/4. Patient remains on 2L n/c. Provider came to bedside x2 to instill medication in chest tube. Chest tube is to be unclamped at 1915 and put back to -20 cm suction. Chest tube remains dry and intact at this time. Medicated with PRN pain medication per MAR. No new issues at this time.

## 2023-07-27 ENCOUNTER — APPOINTMENT (OUTPATIENT)
Dept: CT IMAGING | Facility: HOSPITAL | Age: 78
DRG: 193 | End: 2023-07-27
Payer: MEDICARE

## 2023-07-27 LAB
CYTO UR: NORMAL
LAB AP CASE REPORT: NORMAL
LAB AP CLINICAL INFORMATION: NORMAL
PATH REPORT.FINAL DX SPEC: NORMAL
PATH REPORT.GROSS SPEC: NORMAL

## 2023-07-27 PROCEDURE — 25010000002 ALTEPLASE 2 MG RECONSTITUTED SOLUTION: Performed by: INTERNAL MEDICINE

## 2023-07-27 PROCEDURE — 25010000002 HYDROMORPHONE 1 MG/ML SOLUTION: Performed by: INTERNAL MEDICINE

## 2023-07-27 PROCEDURE — 25010000002 ONDANSETRON PER 1 MG: Performed by: INTERNAL MEDICINE

## 2023-07-27 PROCEDURE — 97110 THERAPEUTIC EXERCISES: CPT

## 2023-07-27 PROCEDURE — 71250 CT THORAX DX C-: CPT

## 2023-07-27 PROCEDURE — 25010000002 METHYLPREDNISOLONE PER 40 MG: Performed by: INTERNAL MEDICINE

## 2023-07-27 PROCEDURE — 3E0L3GC INTRODUCTION OF OTHER THERAPEUTIC SUBSTANCE INTO PLEURAL CAVITY, PERCUTANEOUS APPROACH: ICD-10-PCS | Performed by: INTERNAL MEDICINE

## 2023-07-27 PROCEDURE — 99233 SBSQ HOSP IP/OBS HIGH 50: CPT | Performed by: INTERNAL MEDICINE

## 2023-07-27 PROCEDURE — 25010000002 CEFTRIAXONE PER 250 MG: Performed by: INTERNAL MEDICINE

## 2023-07-27 PROCEDURE — 25010000002 ENOXAPARIN PER 10 MG: Performed by: INTERNAL MEDICINE

## 2023-07-27 RX ORDER — PREDNISONE 20 MG/1
40 TABLET ORAL
Status: DISCONTINUED | OUTPATIENT
Start: 2023-07-28 | End: 2023-07-30

## 2023-07-27 RX ADMIN — CEFTRIAXONE SODIUM 1000 MG: 1 INJECTION, SOLUTION INTRAVENOUS at 09:47

## 2023-07-27 RX ADMIN — ALTEPLASE 10 MG: 2.2 INJECTION, POWDER, LYOPHILIZED, FOR SOLUTION INTRAVENOUS at 16:25

## 2023-07-27 RX ADMIN — ALTEPLASE 10 MG: 2.2 INJECTION, POWDER, LYOPHILIZED, FOR SOLUTION INTRAVENOUS at 10:02

## 2023-07-27 RX ADMIN — DORNASE ALFA 5 MG: 1 SOLUTION RESPIRATORY (INHALATION) at 10:02

## 2023-07-27 RX ADMIN — Medication 10 ML: at 09:47

## 2023-07-27 RX ADMIN — DORNASE ALFA 5 MG: 1 SOLUTION RESPIRATORY (INHALATION) at 16:25

## 2023-07-27 RX ADMIN — SENNOSIDES AND DOCUSATE SODIUM 2 TABLET: 50; 8.6 TABLET ORAL at 20:18

## 2023-07-27 RX ADMIN — TRAZODONE HYDROCHLORIDE 50 MG: 50 TABLET ORAL at 23:10

## 2023-07-27 RX ADMIN — FAMOTIDINE 20 MG: 20 TABLET ORAL at 09:48

## 2023-07-27 RX ADMIN — Medication 1 TABLET: at 09:48

## 2023-07-27 RX ADMIN — ATORVASTATIN CALCIUM 20 MG: 20 TABLET, FILM COATED ORAL at 20:18

## 2023-07-27 RX ADMIN — METHYLPREDNISOLONE SODIUM SUCCINATE 40 MG: 40 INJECTION INTRAMUSCULAR; INTRAVENOUS at 10:07

## 2023-07-27 RX ADMIN — HYDROMORPHONE HYDROCHLORIDE 0.5 MG: 1 INJECTION, SOLUTION INTRAMUSCULAR; INTRAVENOUS; SUBCUTANEOUS at 02:00

## 2023-07-27 RX ADMIN — SODIUM CHLORIDE 40 ML: 9 INJECTION, SOLUTION INTRAVENOUS at 10:07

## 2023-07-27 RX ADMIN — SENNOSIDES AND DOCUSATE SODIUM 2 TABLET: 50; 8.6 TABLET ORAL at 09:47

## 2023-07-27 RX ADMIN — ENOXAPARIN SODIUM 30 MG: 100 INJECTION SUBCUTANEOUS at 09:47

## 2023-07-27 RX ADMIN — Medication 10 ML: at 20:19

## 2023-07-27 RX ADMIN — HYDROMORPHONE HYDROCHLORIDE 0.5 MG: 1 INJECTION, SOLUTION INTRAMUSCULAR; INTRAVENOUS; SUBCUTANEOUS at 11:26

## 2023-07-27 RX ADMIN — METOPROLOL SUCCINATE 25 MG: 25 TABLET, EXTENDED RELEASE ORAL at 09:48

## 2023-07-27 RX ADMIN — LISINOPRIL 20 MG: 20 TABLET ORAL at 09:48

## 2023-07-27 RX ADMIN — QUETIAPINE FUMARATE 25 MG: 25 TABLET ORAL at 20:17

## 2023-07-27 RX ADMIN — ONDANSETRON 4 MG: 2 INJECTION INTRAMUSCULAR; INTRAVENOUS at 23:10

## 2023-07-27 RX ADMIN — METHYLPREDNISOLONE SODIUM SUCCINATE 40 MG: 40 INJECTION INTRAMUSCULAR; INTRAVENOUS at 02:00

## 2023-07-27 RX ADMIN — HYDROCODONE BITARTRATE AND ACETAMINOPHEN 1 TABLET: 10; 325 TABLET ORAL at 20:18

## 2023-07-27 NOTE — PLAN OF CARE
Goal Outcome Evaluation:  Plan of Care Reviewed With: patient        Progress: no change  Outcome Evaluation: Pt c/o pain/discomfort this shift, administered prn pain med as ordered. Pt had difficulty falling asleep this shift, administered prn trazadone as ordered. Chest tube is unclamped with -20cm suction as ordered and dressing intact. Pt rested well this shift. No new issues or new needs noted at this time.

## 2023-07-27 NOTE — CONSULTS
"Nutrition Services    Patient Name: Sandra Sylvester  YOB: 1945  MRN: [id number]  Admission date: 7/19/2023      CLINICAL NUTRITION ASSESSMENT      Reason for Assessment  Identified at risk by screening criteria, MST score 2+, BMI     H&P:    Past Medical History:   Diagnosis Date    COPD (chronic obstructive pulmonary disease)     Hyperlipidemia     Hypertension         Current Problems:   Active Hospital Problems    Diagnosis     **PNA (pneumonia)     Hospital psychosis     Pleural effusion     COPD with acute exacerbation     Hypoxia     Severe malnutrition         Nutrition/Diet History         Narrative     78 year old female admitted with SOA/pneumonia.  See PMH above.    Feeds self Cardiac Diet, Regular Texture , Thin Fluids.  Intake has improved since Thoracentesis/chest tube placed.  % intake in past 24 hours.     Noted with a rash to skin on lower portion of body and upper legs.  Wayne Score = 16  NFPE previously conducted by RD.  Pt met criteria for Severe Malnutrition.    RD requested reweigh this day and current wt is 64.68,  9.5# in past week.  Note pt was extremely SOB when admitted r/t pneumonia and had a chest tube places with 675 cc drainage.       Receives Boost + with all meals.       Anthropometrics        Current Height, Weight Height: 149.9 cm (59\")  Weight: 33.6 kg (74 lb 1.2 oz)   Current BMI Body mass index is 14.96 kg/mý.       Weight Hx  Wt Readings from Last 30 Encounters:   07/20/23 0112 33.6 kg (74 lb 1.2 oz)   07/19/23 1850 34.1 kg (75 lb 2.8 oz)   01/13/23 0600 38.4 kg (84 lb 10.5 oz)   01/12/23 0555 37.6 kg (82 lb 14.3 oz)   01/11/23 0625 38.4 kg (84 lb 10.5 oz)   01/10/23 0452 38.9 kg (85 lb 12.1 oz)   01/10/23 0155 38.9 kg (85 lb 12.1 oz)   01/09/23 2025 42 kg (92 lb 9.5 oz)   09/01/22 1426 40.8 kg (90 lb)   08/01/22 1431 40.8 kg (90 lb)   06/07/22 1016 40.8 kg (90 lb)   05/07/22 1924 40.9 kg (90 lb 2.7 oz)            Wt Change Observation Wt decrease of 10# " (11%) in 6 months     Estimated/Assessed Needs       Energy Requirements #/48.8kg   EST Needs (kcal/day) 30-35 kcals/kg = 1464 - 1708 calories       Protein Requirements    EST Daily Needs (g/day) 1.0-1.2 g/kg = 48.8- 58 grams       Fluid Requirements     Estimated Needs (mL/day) 30-35 ml/kg = 1464 - 1708 ml  (6-7 cups)     Labs/Medications         Pertinent Labs Reviewed.   Results from last 7 days   Lab Units 07/26/23  0858 07/22/23  0537   SODIUM mmol/L 130* 137   POTASSIUM mmol/L 4.7 4.2   CHLORIDE mmol/L 95* 106   CO2 mmol/L 23.6 23.6   BUN mg/dL 19 12   CREATININE mg/dL 0.46* 0.44*   CALCIUM mg/dL 8.9 9.2   BILIRUBIN mg/dL  --  0.2   ALK PHOS U/L  --  106   ALT (SGPT) U/L  --  11   AST (SGOT) U/L  --  20   GLUCOSE mg/dL 125* 135*       Results from last 7 days   Lab Units 07/26/23  0858   MAGNESIUM mg/dL 2.4   PHOSPHORUS mg/dL 3.7   HEMOGLOBIN g/dL 13.7   HEMATOCRIT % 43.6       COVID19   Date Value Ref Range Status   07/19/2023 Not Detected Not Detected - Ref. Range Final     No results found for: HGBA1C      Pertinent Medications Reviewed.     Current Nutrition Orders & Evaluation of Intake       Oral Nutrition     Current PO Diet Diet: Cardiac Diets; Healthy Heart (2-3 Na+); Texture: Regular Texture (IDDSI 7); Fluid Consistency: Thin (IDDSI 0)   Supplement Orders Placed This Encounter      Dietary Nutrition Supplements Boost Plus (Ensure Plus)       Malnutrition Severity Assessment      Patient meets criteria for : Severe Malnutrition           Nutrition Diagnosis         Nutrition Dx Problem 1 Severe malnutrition related to inadequate energy Intake as evidenced by unintended wt change. and body composition changes.       Nutrition Intervention         Cardiac Diet, Regular Texture, Thin liquids  Boost Plus - 1 carton with  meals. +1080 calories, 42 g protein     Medical Nutrition Therapy/Nutrition Education          Learner     Readiness N/A  N/A     Method     Response N/A  N/A      Monitor/Evaluation        Monitor Per protocol, PO intake, Supplement intake, Pertinent labs, Weight, POC/GOC       Nutrition Discharge Plan         Pt will need to continue ONS at least 3 per day upond discharge       Electronically signed by:  Jannette Rankin RD  07/27/23 12:33 EDT

## 2023-07-27 NOTE — PROGRESS NOTES
[facility]     Progress Note    Patient Name: Sandra Sylvester  : 1945  MRN: [id number]  Primary Care Physician:  René Garrett MD  Date of admission: 2023      Subjective   Brief summary.  Patient admitted with pneumonia.      HPI:  Patient admitted with pneumonia and pleural effusion.    Status post pleural tap and chest tube placement .  And had a lot of pain last night requiring a lot of pain meds  This morning she is sleepy    Review of Systems     Complains of back pain, fatigue, shortness of breath.  No confusion.  Sleepy this a.m.  Chest hurts        Objective     Vitals:   Temp:  [97.2 øF (36.2 øC)-98.4 øF (36.9 øC)] 98.2 øF (36.8 øC)  Heart Rate:  [71-90] 74  Resp:  [16-18] 16  BP: (101-140)/(51-63) 101/53  Flow (L/min):  [2] 2    Physical Exam :     Elderly female cachectic and thinly built.    Heart regular, lungs diminished breath sounds bilaterally no rhonchi today.   Chest tube in place  Abdomen soft nontender.  Neurologically awake alert and oriented.  Extremities no edema.      Result Review:  I have personally reviewed the results from the time of this admission to 2023 14:49 EDT and agree with these findings:  [x]  Laboratory  []  Microbiology  []  Radiology  []  EKG/Telemetry   []  Cardiology/Vascular   []  Pathology  []  Old records  []  Other:           Assessment / Plan       Active Hospital Problems:  Active Hospital Problems    Diagnosis     **PNA (pneumonia)     Hospital psychosis     Pleural effusion     COPD with acute exacerbation     Hypoxia     Severe malnutrition        Plan:   Patient post thoracentesis and chest tube placement  Improving  Repeat CT  Continue management per pulmonary  Patient with severe malnutrition, patient has lost weight over the last several months and years  Related to chronic disease  Continue to monitor  Discussed with patient to increase calories and protein content in food, nutritionist on case.       DVT prophylaxis:  Medical  DVT prophylaxis orders are present.    CODE STATUS:   Level Of Support Discussed With: Patient  Code Status (Patient has no pulse and is not breathing): CPR (Attempt to Resuscitate)  Medical Interventions (Patient has pulse or is breathing): Full Support  Release to patient: Routine Release                Electronically signed by René Garrett MD, 07/27/23, 2:51 PM EDT.

## 2023-07-27 NOTE — PROGRESS NOTES
Pulmonary / Critical Care Progress Note      Patient Name: Sandra Sylvester  : 1945  MRN: [id number]  Primary Care Physician:  René Garrett MD  Date of admission: 2023    Subjective   Subjective   Follow-up for pleural effusion, large right-sided pleural effusion and small left-sided pleural effusion.    Over past 24 hours: Continued chest tube to suction, given second and third round of tPA dornase intrapleurally.  Remained on ceftriaxone IV.  Has been on Solu-Medrol 40 mg every 8 hours.    Overnight, no acute events.  Complained of pain around the chest tube site    This morning,   Patient is somnolent, confused, but not agitated.  She remains on oxygen.  Complains of some pain around the chest tube site.  Not able to provide other history.  Chest tube has drained 350 cc over last 24 hours.  Remains on ceftriaxone.  Also on Solu-Medrol 40 mg every 8 hours.    Review of Systems  Could not be obtained, patient not optimally responsive.      Objective   Objective     Vitals:   Temp:  [97.2 øF (36.2 øC)-98.4 øF (36.9 øC)] 97.2 øF (36.2 øC)  Heart Rate:  [71-90] 71  Resp:  [16-18] 16  BP: (109-141)/(51-65) 113/56  Flow (L/min):  [2] 2  Physical Exam   Vital Signs Reviewed   Frail looking female, somnolent, arousable on nasal oxygen, has conversational dyspnea  HEENT:  PERRL, EOMI.  OP, nares clear, no sinus tenderness  Neck:  Supple, no JVD, no thyromegaly  Lymph: no axillary, cervical, supraclavicular lymphadenopathy noted bilaterally  Chest: Poor aeration, dull to percussion right base, no wheezing or rhonchi, some crackles bilaterally, chest tube to suction in place, no significant drainage or discharge around it  CV: RRR, no MGR, pulses 2+, equal.  Abd:  Soft, NT, ND, + BS, no HSM  EXT:  no clubbing, no cyanosis, no edema, no joint tenderness  Neuro: Somnolent, arousable, confused, CN grossly intact, no focal deficits, generalized weakness+  Skin: No rashes or lesions noted         Result Review     Result Review:  I have personally reviewed the results from the time of this admission to 7/27/2023 08:44 EDT and agree with these findings:  [x]  Laboratory  [x]  Microbiology  [x]  Radiology  [x]  EKG/Telemetry   [x]  Cardiology/Vascular   []  Pathology  []  Old records  []  Other:  Most notable findings include:       Lab 07/26/23  0858 07/22/23  0537   WBC 14.50* 12.06*   HEMOGLOBIN 13.7 10.7*   HEMATOCRIT 43.6 34.3   PLATELETS 254 220   SODIUM 130* 137   POTASSIUM 4.7 4.2   CHLORIDE 95* 106   CO2 23.6 23.6   BUN 19 12   CREATININE 0.46* 0.44*   GLUCOSE 125* 135*   CALCIUM 8.9 9.2   PHOSPHORUS 3.7  --    TOTAL PROTEIN  --  5.3*   ALBUMIN 3.2* 2.9*   GLOBULIN  --  2.4     CT Chest Without Contrast Diagnostic    Result Date: 7/22/2023  PROCEDURE: CT CHEST WO CONTRAST DIAGNOSTIC  COMPARISONS: 7/19/2023; 1/9/2023.  INDICATIONS: Pneumonia, complication suspected, x-ray done.  TECHNIQUE: 580 CT images were created without the administration of contrast material.   PROTOCOL:   Standard CT imaging protocol performed.    RADIATION:   Total DLP: 102 mGy*cm; total mAs: 1,294.   Automated exposure control was utilized to minimize radiation dose.  FINDINGS: There is a very large right-sided pleural effusion.  It may be loculated.  It has CT numbers ranging between 10 and 14 Hounsfield units.  Atelectasis and/or pneumonia may be present on the right and to a much lesser degree on the left.  There is a small-to-moderate left-sided pleural effusion, which may also be loculated pleural.  It has CT numbers ranging between 0 and 7 Hounsfield units.  Bilateral empyemas, accounting for the findings, cannot be excluded.  Hemothorax cannot be excluded but is thought to be less likely.  Please correlate clinically.  No gas is seen in pleural fluid collections bilaterally.  No definite acute or subacute rib fractures are seen.  Debris is seen in the upper thoracic esophageal lumen, which is mildly dilated.  Gastroesophageal reflux  disease (GERD) would be in the differential diagnosis.  Esophageal dysmotility is possible.  Extensive atherosclerotic changes are present, including involvement of the coronary arteries.  There is involvement of the thoracic aorta and the great arteries.  No aneurysmal dilatation of the thoracic aorta is suggested.  There is a trace amount of pericardial effusion.  No cardiac enlargement.  Chronic calcified granulomatous disease involves the chest and probably the spleen.  Degenerative changes are seen throughout the imaged spine.  Vertebral compression fractures are noted at approximately C6, T4, T5, T10 and L1.  Probably the greatest degree of compression deformity is at T10 and is estimated at 70 percent or greater.  50 percent or greater compression deformity is seen at C6.  The compression fractures at T4 and T5 are 30-50 percent in degree, slightly greater at T5.  The compression fracture at L1 is probably 50 percent or greater in degree.  These findings are thought more likely to be chronic in nature rather than acute-or-subacute.  Probably the greatest degree of posterior wall retropulsion is associated with the T10 and the L1 fractures and is estimated at 4 mm at T10 and probably 4 mm or less at L1.  No definite acute findings are seen in the partially imaged upper abdomen.  There may be some degree of anasarca.  A right axillary cystic mass is seen within the right axillary region.  It has a CT number measuring about 9 Hounsfield units, as on image 37 of series 2, image 135 of series 5, image 75 of series 4, and adjacent images.  This finding measures approximately 2.9 x 2.1 x 2.7 cm in long-axis axial, short-axis axial, and craniocaudal extent, respectively,.  It may represent a seroma.  A necrotic or suppurative lymph node cannot be excluded but is thought to be less likely.  A vascular abnormality is thought to be less likely, as well.  No retained foreign body is seen in this region.  No ectopic gas  collections are identified.      Impression:   1. There is a very large right-sided pleural effusion, which is probably loculated.  There is associated deformity of the right lung, which may represent atelectasis.  Pneumonia cannot be excluded.  2. A small-to-moderate-sized probably loculated left pleural effusion is seen also.  3. Extensive atherosclerotic changes are noted.  4. There may be anasarca.  5. No cardiac enlargement.  6. There may be prominent reactive mediastinal lymph nodes.  7. There is a nearly 3 cm cystic mass involving the right axilla which may represent some type of benign cyst, such as a seroma.  Suppurative or necrotic adenopathy cannot be excluded but is (are) probably less likely.  8. There are several vertebral compression fractures, which are age-indeterminate but thought most likely to be chronic in nature, including, but not necessarily limited to, C6, T4, T5, T10, and L1.  9. Please see above comments for further detail   1.   Please note that portions of this note were completed with a voice recognition program.  ADIS GRAVES JR, MD       Electronically Signed and Approved By: ADIS GRAVES JR, MD on 7/22/2023 at 0:32                 Assessment & Plan   Assessment / Plan     Active Hospital Problems:  Active Hospital Problems    Diagnosis     **PNA (pneumonia)     Hospital psychosis     Pleural effusion     COPD with acute exacerbation     Hypoxia     Severe malnutrition          Impression:   Large right-sided pleural effusion, possibly loculated  Small left-sided pleural effusion  Mediastinal lymphadenopathy, likely reactive  Anasarca  Chronic smoker  COPD  Possible pneumonia  Altered mental status    Plan:   Check a sputum culture.  Procalcitonin was normal  Continue Brovana and Pulmicort.  Continue with DuoNeb 4 times daily.  Continue supplemental oxygen to keep saturations more than 90%.  Switch to oral prednisone.   Status post chest tube placement.  Had fourth round of  tPA dornase this morning.  Continue with IV antibiotics.  Currently on IV ceftriaxone.  Check CT chest tonight.     DVT prophylaxis:  Medical DVT prophylaxis orders are present.    CODE STATUS:   Level Of Support Discussed With: Patient  Code Status (Patient has no pulse and is not breathing): CPR (Attempt to Resuscitate)  Medical Interventions (Patient has pulse or is breathing): Full Support  Release to patient: Routine Release      I personally reviewed pertinent labs, imaging and provider notes. Discussed with bedside nurse and will discuss with primary service.       Electronically signed by Herminio Munoz MD, 7/27/2023, 08:44 EDT.

## 2023-07-27 NOTE — THERAPY TREATMENT NOTE
Acute Care - Physical Therapy Treatment Note  SYDNIE Stokes     Patient Name: Sandra Sylvester  : 1945  MRN: [id number]  Today's Date: 2023      Visit Dx:     ICD-10-CM ICD-9-CM   1. Pneumonia due to infectious organism, unspecified laterality, unspecified part of lung  J18.9 486   2. Acute respiratory failure with hypoxia  J96.01 518.81   3. Decreased activities of daily living (ADL)  Z78.9 V49.89   4. Difficulty walking  R26.2 719.7     Patient Active Problem List   Diagnosis    Closed fracture of right hip    Closed nondisplaced fracture of acetabulum    HTN (hypertension)    Malnourished    BMI less than 19,adult    Severe malnutrition    PNA (pneumonia)    COPD with acute exacerbation    Hypoxia    Pleural effusion    Hospital psychosis     Past Medical History:   Diagnosis Date    COPD (chronic obstructive pulmonary disease)     Hyperlipidemia     Hypertension      Past Surgical History:   Procedure Laterality Date    CHOLECYSTECTOMY       PT Assessment (last 12 hours)       PT Evaluation and Treatment       Row Name 23 1539          Physical Therapy Time and Intention    Subjective Information complains of;weakness  -RH     Document Type therapy note (daily note)  -RH     Mode of Treatment physical therapy;individual therapy  -RH     Patient Effort fair  -RH       Row Name 23 1539          Pain Scale: FACES Pre/Post-Treatment    Pain: FACES Scale, Pretreatment 0-->no hurt  -RH     Posttreatment Pain Rating 0-->no hurt  -RH       Row Name             Wound 23 0300 Right lower arm Skin Tear    Wound - Properties Group Placement Date: 23  -VS Placement Time: 0300  -VS Present on Hospital Admission: Y  -VS Side: Right  -VS Orientation: lower  -VS Location: arm  -VS Primary Wound Type: Skin tear  -VS    Retired Wound - Properties Group Placement Date: 23  -VS Placement Time: 0300  -VS Present on Hospital Admission: Y  -VS Side: Right  -VS Orientation: lower  -VS Location: arm   -VS Primary Wound Type: Skin tear  -VS    Retired Wound - Properties Group Date first assessed: 07/20/23  -VS Time first assessed: 0300  -VS Present on Hospital Admission: Y  -VS Side: Right  -VS Location: arm  -VS Primary Wound Type: Skin tear  -VS      Row Name             Wound 07/22/23 0645 Left lower arm    Wound - Properties Group Placement Date: 07/22/23  -DK Placement Time: 0645 -DK Side: Left  -DK Orientation: lower  -DK Location: arm  -DK    Retired Wound - Properties Group Placement Date: 07/22/23  -DK Placement Time: 0645  -DK Side: Left  -DK Orientation: lower  -DK Location: arm  -DK    Retired Wound - Properties Group Date first assessed: 07/22/23  -DK Time first assessed: 0645  -DK Side: Left  -DK Location: arm  -DK      Row Name 07/27/23 1539          Vital Signs    O2 Delivery Intra Treatment --  Pt on 2L with nasal cannula.  -RH       Row Name 07/27/23 1539          Progress Summary (PT)    Progress Toward Functional Goals (PT) progress toward functional goals is fair  -RH               User Key  (r) = Recorded By, (t) = Taken By, (c) = Cosigned By      Initials Name Provider Type    RH Reno Ambrose PTA Physical Therapist Assistant    Sage Walls, RN Registered Nurse    VS Radha Marinelli RN Registered Nurse                  Bilateral Lower Extremity   Exercise  Reps  Sets    Short arc quads   10 2   Heel slides  10 2   Ankle pumps  10 2   Quad sets  10 2   Straight leg raise  10 2   Hip ab/adduction 10 2        Physical Therapy Education       Title: PT OT SLP Therapies (In Progress)       Topic: Physical Therapy (In Progress)       Point: Mobility training (In Progress)       Learning Progress Summary             Patient Acceptance, E, NR by CS at 7/21/2023 1542                         Point: Home exercise program (Not Started)       Learner Progress:  Not documented in this visit.              Point: Body mechanics (Not Started)       Learner Progress:  Not documented in this visit.               Point: Precautions (In Progress)       Learning Progress Summary             Patient Acceptance, E, NR by  at 7/21/2023 1542                                         User Key       Initials Effective Dates Name Provider Type Discipline     04/25/21 -  Rianna Lopez, PT Physical Therapist PT                  PT Recommendation and Plan     Progress Summary (PT)  Progress Toward Functional Goals (PT): progress toward functional goals is fair   Outcome Measures       Row Name 07/27/23 1500             How much help from another person do you currently need...    Turning from your back to your side while in flat bed without using bedrails? 3  -RH      Moving from lying on back to sitting on the side of a flat bed without bedrails? 2  -RH      Moving to and from a bed to a chair (including a wheelchair)? 2  -RH      Standing up from a chair using your arms (e.g., wheelchair, bedside chair)? 2  -RH      Climbing 3-5 steps with a railing? 2  -RH      To walk in hospital room? 2  -RH      AM-PAC 6 Clicks Score (PT) 13  -RH                User Key  (r) = Recorded By, (t) = Taken By, (c) = Cosigned By      Initials Name Provider Type    RH Reno Ambrose PTA Physical Therapist Assistant                     Time Calculation:    PT Charges       Row Name 07/27/23 1538             Time Calculation    PT Received On 07/27/23  -RH         Timed Charges    67381 - PT Therapeutic Exercise Minutes 12  -RH         Total Minutes    Timed Charges Total Minutes 12  -RH       Total Minutes 12  -RH                User Key  (r) = Recorded By, (t) = Taken By, (c) = Cosigned By      Initials Name Provider Type     Reno Ambrose PTA Physical Therapist Assistant                  Therapy Charges for Today       Code Description Service Date Service Provider Modifiers Qty    [id number] HC PT THER PROC EA 15 MIN 7/27/2023 Reno Ambrose PTA GP 1            PT G-Codes  Outcome Measure Options: AM-PAC 6 Clicks Daily Activity (OT),  Optimal Instrument  AM-PAC 6 Clicks Score (PT): 13  AM-PAC 6 Clicks Score (OT): 21    Reno Ambrose, PTA  7/27/2023

## 2023-07-28 LAB
BACTERIA FLD CULT: NORMAL
GRAM STN SPEC: NORMAL
GRAM STN SPEC: NORMAL

## 2023-07-28 PROCEDURE — 63710000001 PREDNISONE PER 1 MG: Performed by: INTERNAL MEDICINE

## 2023-07-28 PROCEDURE — 25010000002 ENOXAPARIN PER 10 MG: Performed by: INTERNAL MEDICINE

## 2023-07-28 PROCEDURE — 97110 THERAPEUTIC EXERCISES: CPT

## 2023-07-28 PROCEDURE — 32562 LYSE CHEST FIBRIN SUBQ DAY: CPT | Performed by: INTERNAL MEDICINE

## 2023-07-28 PROCEDURE — 25010000002 ALTEPLASE 2 MG RECONSTITUTED SOLUTION: Performed by: INTERNAL MEDICINE

## 2023-07-28 PROCEDURE — 25010000002 HYDROMORPHONE 1 MG/ML SOLUTION: Performed by: INTERNAL MEDICINE

## 2023-07-28 PROCEDURE — 3E0L3GC INTRODUCTION OF OTHER THERAPEUTIC SUBSTANCE INTO PLEURAL CAVITY, PERCUTANEOUS APPROACH: ICD-10-PCS | Performed by: INTERNAL MEDICINE

## 2023-07-28 PROCEDURE — 99233 SBSQ HOSP IP/OBS HIGH 50: CPT | Performed by: INTERNAL MEDICINE

## 2023-07-28 RX ORDER — AMOXICILLIN AND CLAVULANATE POTASSIUM 875; 125 MG/1; MG/1
1 TABLET, FILM COATED ORAL EVERY 12 HOURS SCHEDULED
Status: DISCONTINUED | OUTPATIENT
Start: 2023-07-28 | End: 2023-08-01 | Stop reason: HOSPADM

## 2023-07-28 RX ADMIN — TRAZODONE HYDROCHLORIDE 50 MG: 50 TABLET ORAL at 21:01

## 2023-07-28 RX ADMIN — HYDROCODONE BITARTRATE AND ACETAMINOPHEN 1 TABLET: 10; 325 TABLET ORAL at 04:25

## 2023-07-28 RX ADMIN — AMOXICILLIN AND CLAVULANATE POTASSIUM 1 TABLET: 875; 125 TABLET, FILM COATED ORAL at 21:01

## 2023-07-28 RX ADMIN — DORNASE ALFA 5 MG: 1 SOLUTION RESPIRATORY (INHALATION) at 09:07

## 2023-07-28 RX ADMIN — METOPROLOL SUCCINATE 25 MG: 25 TABLET, EXTENDED RELEASE ORAL at 08:10

## 2023-07-28 RX ADMIN — QUETIAPINE FUMARATE 25 MG: 25 TABLET ORAL at 21:01

## 2023-07-28 RX ADMIN — LISINOPRIL 20 MG: 20 TABLET ORAL at 08:10

## 2023-07-28 RX ADMIN — SENNOSIDES AND DOCUSATE SODIUM 2 TABLET: 50; 8.6 TABLET ORAL at 08:10

## 2023-07-28 RX ADMIN — ATORVASTATIN CALCIUM 20 MG: 20 TABLET, FILM COATED ORAL at 21:01

## 2023-07-28 RX ADMIN — BISACODYL 5 MG: 5 TABLET, COATED ORAL at 21:01

## 2023-07-28 RX ADMIN — HYDROCODONE BITARTRATE AND ACETAMINOPHEN 1 TABLET: 10; 325 TABLET ORAL at 11:22

## 2023-07-28 RX ADMIN — HYDROMORPHONE HYDROCHLORIDE 0.5 MG: 1 INJECTION, SOLUTION INTRAMUSCULAR; INTRAVENOUS; SUBCUTANEOUS at 08:10

## 2023-07-28 RX ADMIN — AMOXICILLIN AND CLAVULANATE POTASSIUM 1 TABLET: 875; 125 TABLET, FILM COATED ORAL at 14:40

## 2023-07-28 RX ADMIN — HYDROCODONE BITARTRATE AND ACETAMINOPHEN 1 TABLET: 10; 325 TABLET ORAL at 21:01

## 2023-07-28 RX ADMIN — Medication 10 ML: at 08:09

## 2023-07-28 RX ADMIN — FAMOTIDINE 20 MG: 20 TABLET ORAL at 08:10

## 2023-07-28 RX ADMIN — Medication 10 ML: at 21:01

## 2023-07-28 RX ADMIN — PREDNISONE 40 MG: 20 TABLET ORAL at 08:10

## 2023-07-28 RX ADMIN — SENNOSIDES AND DOCUSATE SODIUM 2 TABLET: 50; 8.6 TABLET ORAL at 21:01

## 2023-07-28 RX ADMIN — ENOXAPARIN SODIUM 30 MG: 100 INJECTION SUBCUTANEOUS at 08:09

## 2023-07-28 RX ADMIN — ALTEPLASE 10 MG: 2.2 INJECTION, POWDER, LYOPHILIZED, FOR SOLUTION INTRAVENOUS at 09:07

## 2023-07-28 RX ADMIN — Medication 1 TABLET: at 10:57

## 2023-07-28 NOTE — PROCEDURES
Intrapleural tPA dornase instillation sixth time:     Patient with loculated turbid colored pleural effusion.      Under aseptic precautions, chest tube was flushed, and tPA and dornase instilled through the three way stop cock.  Chest tube clamped to the chest.  Will open to drain and suction in an hour.     No complications of the procedure.

## 2023-07-28 NOTE — PROGRESS NOTES
Pulmonary / Critical Care Progress Note      Patient Name: Sandra Sylvester  : 1945  MRN: [id number]  Primary Care Physician:  René Garrett MD  Date of admission: 2023    Subjective   Subjective   Follow-up for pleural effusion, large right-sided pleural effusion and small left-sided pleural effusion.    Over past 24 hours: Continued chest tube to suction, given fourth and fifth round of tPA dornase intrapleurally.  Remained on ceftriaxone IV.  Steroids were changed to oral prednisone.  CT scan of the chest was done overnight.    Overnight, no acute events.     This morning,   Patient is out of bed to chair.  She remains on oxygen.  Complains of some pain around the chest tube site.  Not able to provide other history.  Chest tube has drained 75 cc over last 24 hours.  Remains on ceftriaxone.      Review of Systems  General:  No Fatigue, No Fever  HEENT: No dysphagia, No Visual Changes, no rhinorrhea  Respiratory:  + Productive cough,+Dyspnea, Thick yellow phlegm, No Pleuritic Pain, + wheezing, no hemoptysis  Cardiovascular: Denies chest pain, denies palpitations,+GARZON, No Chest Pressure  Gastrointestinal:  No Abdominal Pain, No Nausea, No Vomiting, No Diarrhea  Genitourinary:  No Dysuria, No Frequency, No Hesitancy  Musculoskeletal: No muscle pain or swelling  Endocrine:  No Heat Intolerance, No Cold Intolerance, No Fatigue  Neurologic:  No Confusion, no Dysarthria, No Headaches  Skin:  No Rash, No Open Wounds        Objective   Objective     Vitals:   Temp:  [97.7 øF (36.5 øC)-98.4 øF (36.9 øC)] 98.2 øF (36.8 øC)  Heart Rate:  [74-80] 76  Resp:  [16-18] 18  BP: (101-154)/(53-68) 113/57  Flow (L/min):  [2] 2  Physical Exam   Vital Signs Reviewed   Frail looking female, somnolent, arousable on nasal oxygen, has conversational dyspnea  HEENT:  PERRL, EOMI.  OP, nares clear, no sinus tenderness  Neck:  Supple, no JVD, no thyromegaly  Lymph: no axillary, cervical, supraclavicular lymphadenopathy noted  bilaterally  Chest: Poor aeration, dull to percussion right base, no wheezing or rhonchi, some crackles bilaterally, chest tube to suction in place, no significant drainage or discharge around it  CV: RRR, no MGR, pulses 2+, equal.  Abd:  Soft, NT, ND, + BS, no HSM  EXT:  no clubbing, no cyanosis, no edema, no joint tenderness  Neuro: Somnolent, arousable, confused, CN grossly intact, no focal deficits, generalized weakness+  Skin: No rashes or lesions noted         Result Review    Result Review:  I have personally reviewed the results from the time of this admission to 7/28/2023 07:57 EDT and agree with these findings:  [x]  Laboratory  [x]  Microbiology  [x]  Radiology  [x]  EKG/Telemetry   [x]  Cardiology/Vascular   []  Pathology  []  Old records  []  Other:  Most notable findings include:       Lab 07/26/23  0858 07/22/23  0537   WBC 14.50* 12.06*   HEMOGLOBIN 13.7 10.7*   HEMATOCRIT 43.6 34.3   PLATELETS 254 220   SODIUM 130* 137   POTASSIUM 4.7 4.2   CHLORIDE 95* 106   CO2 23.6 23.6   BUN 19 12   CREATININE 0.46* 0.44*   GLUCOSE 125* 135*   CALCIUM 8.9 9.2   PHOSPHORUS 3.7  --    TOTAL PROTEIN  --  5.3*   ALBUMIN 3.2* 2.9*   GLOBULIN  --  2.4     CT Chest Without Contrast Diagnostic    Result Date: 7/27/2023  PROCEDURE: CT CHEST WO CONTRAST DIAGNOSTIC  COMPARISON: [facility], CT, CT CHEST WO CONTRAST DIAGNOSTIC, 7/21/2023, 23:11.  INDICATIONS: Loculated pleural effusion  TECHNIQUE: CT images were created without the administration of contrast material.   PROTOCOL:   Standard imaging protocol performed    RADIATION:   DLP: 29 mGy*cm   Automated exposure control was utilized to minimize radiation dose.  FINDINGS:  The visualized soft tissue structures at the base of the neck appear within normal limits.  There is no lower cervical or axillary adenopathy.  There is a 2.7 x 2.4 cm subcutaneous lesion within the right axilla which could represent an epidermal inclusion cyst or large sebaceous cyst.   Correlate clinically with exam.  The heart size is normal.  There is no pericardial effusion.  The aorta is normal in caliber.  There is coronary and aortic atherosclerotic calcification.  The main pulmonary artery appears normal in caliber.  There is no pathologic mediastinal or hilar adenopathy.  There are calcified right paratracheal, right hilar and subcarinal lymph nodes likely related to chronic granulomatous disease.  A right-sided chest tube is present and terminates within the anterior aspect of the right basilar pleural space.  There is a trace residual pleural effusion within the right posterior pleural space.  There is no evidence of pneumothorax.  There is a small left-sided pleural effusion with associated left lower lobe compressive atelectasis.  There are secretions within the central airways with mild bronchial wall thickening.  There is diffuse centrilobular emphysema.  There is scattered calcified granulomas.  The esophagus is normal in course and caliber.  Visualized portions of the upper abdomen demonstrate no acute findings.  There is exaggerated thoracic kyphosis with unchanged severe compression fractures at the T10 and L1 levels.      Impression:   1. Right-sided chest tube within the anterior basilar aspect of the pleural space.  Trace residual right-sided pleural effusion is present within the posterior right pleural space.  2. Small left-sided pleural effusion with associated left basilar compressive atelectasis.  3. Moderate centrilobular emphysema.  Central bronchial wall thickening which may indicate chronic bronchitis. 4. Chronic compression fractures of the lower thoracic and upper lumbar spine. 5. Circumscribed low-density mass within the right axillary region, favored to represent a benign skin lesions such as an epidermal inclusion cyst or sebaceous cyst.  Correlate clinically with exam.        CHARISSE HERNANDEZ MD       Electronically Signed and Approved By: CHARISSE HERNANDEZ MD on  7/27/2023 at 19:57             CT Chest Without Contrast Diagnostic    Result Date: 7/22/2023  PROCEDURE: CT CHEST WO CONTRAST DIAGNOSTIC  COMPARISONS: 7/19/2023; 1/9/2023.  INDICATIONS: Pneumonia, complication suspected, x-ray done.  TECHNIQUE: 580 CT images were created without the administration of contrast material.   PROTOCOL:   Standard CT imaging protocol performed.    RADIATION:   Total DLP: 102 mGy*cm; total mAs: 1,294.   Automated exposure control was utilized to minimize radiation dose.  FINDINGS: There is a very large right-sided pleural effusion.  It may be loculated.  It has CT numbers ranging between 10 and 14 Hounsfield units.  Atelectasis and/or pneumonia may be present on the right and to a much lesser degree on the left.  There is a small-to-moderate left-sided pleural effusion, which may also be loculated pleural.  It has CT numbers ranging between 0 and 7 Hounsfield units.  Bilateral empyemas, accounting for the findings, cannot be excluded.  Hemothorax cannot be excluded but is thought to be less likely.  Please correlate clinically.  No gas is seen in pleural fluid collections bilaterally.  No definite acute or subacute rib fractures are seen.  Debris is seen in the upper thoracic esophageal lumen, which is mildly dilated.  Gastroesophageal reflux disease (GERD) would be in the differential diagnosis.  Esophageal dysmotility is possible.  Extensive atherosclerotic changes are present, including involvement of the coronary arteries.  There is involvement of the thoracic aorta and the great arteries.  No aneurysmal dilatation of the thoracic aorta is suggested.  There is a trace amount of pericardial effusion.  No cardiac enlargement.  Chronic calcified granulomatous disease involves the chest and probably the spleen.  Degenerative changes are seen throughout the imaged spine.  Vertebral compression fractures are noted at approximately C6, T4, T5, T10 and L1.  Probably the greatest degree of  compression deformity is at T10 and is estimated at 70 percent or greater.  50 percent or greater compression deformity is seen at C6.  The compression fractures at T4 and T5 are 30-50 percent in degree, slightly greater at T5.  The compression fracture at L1 is probably 50 percent or greater in degree.  These findings are thought more likely to be chronic in nature rather than acute-or-subacute.  Probably the greatest degree of posterior wall retropulsion is associated with the T10 and the L1 fractures and is estimated at 4 mm at T10 and probably 4 mm or less at L1.  No definite acute findings are seen in the partially imaged upper abdomen.  There may be some degree of anasarca.  A right axillary cystic mass is seen within the right axillary region.  It has a CT number measuring about 9 Hounsfield units, as on image 37 of series 2, image 135 of series 5, image 75 of series 4, and adjacent images.  This finding measures approximately 2.9 x 2.1 x 2.7 cm in long-axis axial, short-axis axial, and craniocaudal extent, respectively,.  It may represent a seroma.  A necrotic or suppurative lymph node cannot be excluded but is thought to be less likely.  A vascular abnormality is thought to be less likely, as well.  No retained foreign body is seen in this region.  No ectopic gas collections are identified.      Impression:   1. There is a very large right-sided pleural effusion, which is probably loculated.  There is associated deformity of the right lung, which may represent atelectasis.  Pneumonia cannot be excluded.  2. A small-to-moderate-sized probably loculated left pleural effusion is seen also.  3. Extensive atherosclerotic changes are noted.  4. There may be anasarca.  5. No cardiac enlargement.  6. There may be prominent reactive mediastinal lymph nodes.  7. There is a nearly 3 cm cystic mass involving the right axilla which may represent some type of benign cyst, such as a seroma.  Suppurative or necrotic  adenopathy cannot be excluded but is (are) probably less likely.  8. There are several vertebral compression fractures, which are age-indeterminate but thought most likely to be chronic in nature, including, but not necessarily limited to, C6, T4, T5, T10, and L1.  9. Please see above comments for further detail   1.   Please note that portions of this note were completed with a voice recognition program.  ADIS GRAVES JR, MD       Electronically Signed and Approved By: ADIS GRAVES JR, MD on 7/22/2023 at 0:32                 Assessment & Plan   Assessment / Plan     Active Hospital Problems:  Active Hospital Problems    Diagnosis     **PNA (pneumonia)     Hospital psychosis     Pleural effusion     COPD with acute exacerbation     Hypoxia     Severe malnutrition          Impression:   Large right-sided pleural effusion, possibly loculated  Small left-sided pleural effusion  Mediastinal lymphadenopathy, likely reactive  Anasarca  Chronic smoker  COPD  Possible pneumonia  Altered mental status    Plan:   Continue Brovana and Pulmicort.  Continue with DuoNeb 4 times daily.  Continue supplemental oxygen to keep saturations more than 90%.  Switch to oral prednisone.   Status post chest tube placement.  Had 6th dose of tPA dornase this morning.  If pleural effusion is less than 100 cc over next 24 hours, we will consider removing chest tube tomorrow.  Completed a week of ceftriaxone.  Would do Augmentin for next 1 to 2 weeks.  CT chest shows significant improvement in pleural effusion on the right side.  Minimal pleural effusion on the left    DVT prophylaxis:  Medical DVT prophylaxis orders are present.    CODE STATUS:   Level Of Support Discussed With: Patient  Code Status (Patient has no pulse and is not breathing): CPR (Attempt to Resuscitate)  Medical Interventions (Patient has pulse or is breathing): Full Support  Release to patient: Routine Release      I personally reviewed pertinent labs, imaging and  provider notes. Discussed with bedside nurse and will discuss with primary service.       Electronically signed by Herminio Munoz MD, 7/28/2023, 07:57 EDT.

## 2023-07-28 NOTE — PLAN OF CARE
Goal Outcome Evaluation:  Plan of Care Reviewed With: patient        Progress: no change  Outcome Evaluation: Pt remained A&Ox3 minus situation. Also, pt remained on 2L via nasal cannula maintaining stable oxygen saturation. Pt was medicated with PRN Dilaudid to help achieve an acceptable pain toelrance level. Preventative was added to coccyx. Pt under went CT this shift. Chest tube remained in place and unclamped set to -20cm suction as per order. All other vital signs remained stable.

## 2023-07-28 NOTE — THERAPY TREATMENT NOTE
Patient Name: Sandra Sylvester  : 1945    MRN: [id number]                              Today's Date: 2023       Admit Date: 2023    Visit Dx:     ICD-10-CM ICD-9-CM   1. Pneumonia due to infectious organism, unspecified laterality, unspecified part of lung  J18.9 486   2. Acute respiratory failure with hypoxia  J96.01 518.81   3. Decreased activities of daily living (ADL)  Z78.9 V49.89   4. Difficulty walking  R26.2 719.7     Patient Active Problem List   Diagnosis    Closed fracture of right hip    Closed nondisplaced fracture of acetabulum    HTN (hypertension)    Malnourished    BMI less than 19,adult    Severe malnutrition    PNA (pneumonia)    COPD with acute exacerbation    Hypoxia    Pleural effusion    Hospital psychosis     Past Medical History:   Diagnosis Date    COPD (chronic obstructive pulmonary disease)     Hyperlipidemia     Hypertension      Past Surgical History:   Procedure Laterality Date    CHOLECYSTECTOMY        General Information       Row Name 23 0953          OT Time and Intention    Document Type therapy note (daily note)  -AV     Mode of Treatment individual therapy;occupational therapy  -AV       Row Name 23 0953          General Information    Existing Precautions/Restrictions fall;oxygen therapy device and L/min  chest tube  -AV       Row Name 23 0953          Cognition    Orientation Status (Cognition) --  alert. pleasant and cooperative. able to retain information and follow commands.  -AV       Row Name 23 0953          Safety Issues, Functional Mobility    Impairments Affecting Function (Mobility) balance;strength;endurance/activity tolerance  -AV               User Key  (r) = Recorded By, (t) = Taken By, (c) = Cosigned By      Initials Name Provider Type    AV Toñito Cardenas OT Occupational Therapist                     Mobility/ADL's    No documentation.                  Obj/Interventions       Row Name 23 0955          Shoulder  (Therapeutic Exercise)    Shoulder Strengthening (Therapeutic Exercise) bilateral;horizontal aBduction/aDduction;sitting;1 lb free weight;15 repititions  -AV       Row Name 07/28/23 0955          Elbow/Forearm (Therapeutic Exercise)    Elbow/Forearm Strengthening (Therapeutic Exercise) bilateral;flexion;extension;supination;pronation;sitting;1 lb free weight;15 repititions  -AV       Row Name 07/28/23 0955          Motor Skills    Therapeutic Exercise shoulder;elbow/forearm  performed while in recliner/ 2L O2. moderate cues/ demonstration required. short rest breaks required after each different exercise.  -AV               User Key  (r) = Recorded By, (t) = Taken By, (c) = Cosigned By      Initials Name Provider Type    Toñito Gant OT Occupational Therapist                   Goals/Plan    No documentation.                  Clinical Impression       [facility] Name 07/28/23 0956          Pain Assessment    Pretreatment Pain Rating 5/10  -AV     Posttreatment Pain Rating 5/10  -AV     Pain Location - back  -AV     Pain Intervention(s) Nursing Notified  -AV       Row Name 07/28/23 0956          Plan of Care Review    Progress no change  -AV     Outcome Evaluation Patient performed upper extremity therapeutic exercises with 1# resistance to improve strength and endurance needed to support ADLs. Continued OT is indicated to remediate/compensate for deficits to maximize independence and safety with functional tasks.  -AV       [facility] Name 07/28/23 0956          Vital Signs    O2 Delivery Pre Treatment nasal cannula  2  -AV     O2 Delivery Intra Treatment nasal cannula  2  -AV     O2 Delivery Post Treatment nasal cannula  2  -AV               User Key  (r) = Recorded By, (t) = Taken By, (c) = Cosigned By      Initials Name Provider Type    Toñito Gant OT Occupational Therapist                   Outcome Measures       Row Name 07/28/23 0958          How much help from another is currently needed...    Putting on and  taking off regular lower body clothing? 3  -AV     Bathing (including washing, rinsing, and drying) 3  -AV     Toileting (which includes using toilet bed pan or urinal) 3  -AV     Putting on and taking off regular upper body clothing 4  -AV     Taking care of personal grooming (such as brushing teeth) 4  -AV     Eating meals 4  -AV     AM-PAC 6 Clicks Score (OT) 21  -AV       Row Name 07/28/23 0958          Optimal Instrument    Bending/Stooping 2  -AV     Standing 2  -AV     Reaching 1  -AV               User Key  (r) = Recorded By, (t) = Taken By, (c) = Cosigned By      Initials Name Provider Type    Toñito Gant OT Occupational Therapist                    Occupational Therapy Education       Title: PT OT SLP Therapies (In Progress)       Topic: Occupational Therapy (In Progress)       Point: ADL training (Done)       Description:   Instruct learner(s) on proper safety adaptation and remediation techniques during self care or transfers.   Instruct in proper use of assistive devices.                  Learning Progress Summary             Patient Acceptance, E, VU by AV at 7/21/2023 1032                         Point: Home exercise program (Not Started)       Description:   Instruct learner(s) on appropriate technique for monitoring, assisting and/or progressing therapeutic exercises/activities.                  Learner Progress:  Not documented in this visit.              Point: Precautions (Not Started)       Description:   Instruct learner(s) on prescribed precautions during self-care and functional transfers.                  Learner Progress:  Not documented in this visit.              Point: Body mechanics (Not Started)       Description:   Instruct learner(s) on proper positioning and spine alignment during self-care, functional mobility activities and/or exercises.                  Learner Progress:  Not documented in this visit.                              User Key       Initials Effective Dates  Name Provider Type Discipline    AV 06/16/21 -  Toñito Cardenas OT Occupational Therapist OT                  OT Recommendation and Plan  Planned Therapy Interventions (OT): activity tolerance training, BADL retraining, functional balance retraining, patient/caregiver education/training, strengthening exercise, transfer/mobility retraining, occupation/activity based interventions  Therapy Frequency (OT): 5 times/wk  Plan of Care Review  Plan of Care Reviewed With: patient  Progress: no change  Outcome Evaluation: Patient performed upper extremity therapeutic exercises with 1# resistance to improve strength and endurance needed to support ADLs. Continued OT is indicated to remediate/compensate for deficits to maximize independence and safety with functional tasks.     Time Calculation:   Evaluation Complexity (OT)  Review Occupational Profile/Medical/Therapy History Complexity: expanded/moderate complexity  Assessment, Occupational Performance/Identification of Deficit Complexity: 3-5 performance deficits  Clinical Decision Making Complexity (OT): detailed assessment/moderate complexity  Overall Complexity of Evaluation (OT): moderate complexity     Time Calculation- OT       Row Name 07/28/23 1000             Time Calculation- OT    OT Received On 07/28/23  -AV      OT Goal Re-Cert Due Date 07/30/23  -AV         Timed Charges    81678 - OT Therapeutic Exercise Minutes 10  -AV         Total Minutes    Timed Charges Total Minutes 10  -AV       Total Minutes 10  -AV                User Key  (r) = Recorded By, (t) = Taken By, (c) = Cosigned By      Initials Name Provider Type    AV Toñito Cardenas OT Occupational Therapist                  Therapy Charges for Today       Code Description Service Date Service Provider Modifiers Qty    [id number]  OT THER PROC EA 15 MIN 7/28/2023 Toñito Cardenas OT GO 1                 Toñito Cardenas OT  7/28/2023

## 2023-07-28 NOTE — PROGRESS NOTES
[facility]     Progress Note    Patient Name: Sandra Sylvester  : 1945  MRN: [id number]  Primary Care Physician:  René Garrett MD  Date of admission: 2023      Subjective   Brief summary.  Patient admitted with pneumonia.      HPI:  Patient admitted with pneumonia and pleural effusion.    Status post pleural tap and chest tube placement .  Getting tPA dornase.  Patient sitting in chair today, working on diet.  Eating small amounts.    Review of Systems   No fever chills  Complains of back pain, fatigue, shortness of breath..  Chest hurts.  Chest tube draining around 75 cc in last 24 hours        Objective     Vitals:   Temp:  [97.5 øF (36.4 øC)-98.4 øF (36.9 øC)] 97.6 øF (36.4 øC)  Heart Rate:  [71-80] 74  Resp:  [16-18] 18  BP: ()/(53-68) 95/53  Flow (L/min):  [2] 2    Physical Exam :     Elderly female cachectic and thinly built.    Heart regular, lungs diminished breath sounds bilaterally no rhonchi today.   Chest tube in place  Abdomen soft nontender.  Neurologically awake alert and oriented.  Extremities no edema.      Result Review:  I have personally reviewed the results from the time of this admission to 2023 17:23 EDT and agree with these findings:  [x]  Laboratory  []  Microbiology  []  Radiology  []  EKG/Telemetry   []  Cardiology/Vascular   []  Pathology  []  Old records  []  Other:           Assessment / Plan       Active Hospital Problems:  Active Hospital Problems    Diagnosis     **PNA (pneumonia)     Hospital psychosis     Pleural effusion     COPD with acute exacerbation     Hypoxia     Severe malnutrition        Plan:   Patient post thoracentesis and chest tube placement  Improving, only 75 cc last 24 hours through the chest tube  Repeat CT reviewed  Continue management per pulmonary  Possible discontinuation of chest tube tomorrow  Cussed with patient's son at bedside       DVT prophylaxis:  Medical DVT prophylaxis orders are present.    CODE STATUS:   Level Of  Support Discussed With: Patient  Code Status (Patient has no pulse and is not breathing): CPR (Attempt to Resuscitate)  Medical Interventions (Patient has pulse or is breathing): Full Support  Release to patient: Routine Release              Electronically signed by René Garrett MD, 07/28/23, 5:26 PM EDT.

## 2023-07-29 ENCOUNTER — APPOINTMENT (OUTPATIENT)
Dept: GENERAL RADIOLOGY | Facility: HOSPITAL | Age: 78
DRG: 193 | End: 2023-07-29
Payer: MEDICARE

## 2023-07-29 PROCEDURE — 63710000001 PREDNISONE PER 1 MG: Performed by: INTERNAL MEDICINE

## 2023-07-29 PROCEDURE — 25010000002 HYDROMORPHONE 1 MG/ML SOLUTION: Performed by: INTERNAL MEDICINE

## 2023-07-29 PROCEDURE — 25010000002 ENOXAPARIN PER 10 MG: Performed by: INTERNAL MEDICINE

## 2023-07-29 PROCEDURE — 94799 UNLISTED PULMONARY SVC/PX: CPT

## 2023-07-29 PROCEDURE — 25010000002 PROCHLORPERAZINE 10 MG/2ML SOLUTION: Performed by: INTERNAL MEDICINE

## 2023-07-29 PROCEDURE — 71045 X-RAY EXAM CHEST 1 VIEW: CPT

## 2023-07-29 PROCEDURE — 99233 SBSQ HOSP IP/OBS HIGH 50: CPT | Performed by: STUDENT IN AN ORGANIZED HEALTH CARE EDUCATION/TRAINING PROGRAM

## 2023-07-29 RX ADMIN — METOPROLOL SUCCINATE 25 MG: 25 TABLET, EXTENDED RELEASE ORAL at 08:15

## 2023-07-29 RX ADMIN — Medication 10 ML: at 20:56

## 2023-07-29 RX ADMIN — HYDROCODONE BITARTRATE AND ACETAMINOPHEN 1 TABLET: 10; 325 TABLET ORAL at 20:55

## 2023-07-29 RX ADMIN — SENNOSIDES AND DOCUSATE SODIUM 2 TABLET: 50; 8.6 TABLET ORAL at 08:15

## 2023-07-29 RX ADMIN — HYDROCODONE BITARTRATE AND ACETAMINOPHEN 1 TABLET: 10; 325 TABLET ORAL at 12:45

## 2023-07-29 RX ADMIN — PROCHLORPERAZINE EDISYLATE 5 MG: 5 INJECTION, SOLUTION INTRAMUSCULAR; INTRAVENOUS at 18:52

## 2023-07-29 RX ADMIN — ENOXAPARIN SODIUM 30 MG: 100 INJECTION SUBCUTANEOUS at 08:15

## 2023-07-29 RX ADMIN — QUETIAPINE FUMARATE 25 MG: 25 TABLET ORAL at 20:55

## 2023-07-29 RX ADMIN — AMOXICILLIN AND CLAVULANATE POTASSIUM 1 TABLET: 875; 125 TABLET, FILM COATED ORAL at 20:55

## 2023-07-29 RX ADMIN — ATORVASTATIN CALCIUM 20 MG: 20 TABLET, FILM COATED ORAL at 20:55

## 2023-07-29 RX ADMIN — Medication 10 ML: at 08:16

## 2023-07-29 RX ADMIN — PREDNISONE 40 MG: 20 TABLET ORAL at 08:15

## 2023-07-29 RX ADMIN — Medication 1 TABLET: at 08:15

## 2023-07-29 RX ADMIN — HYDROMORPHONE HYDROCHLORIDE 0.5 MG: 1 INJECTION, SOLUTION INTRAMUSCULAR; INTRAVENOUS; SUBCUTANEOUS at 08:15

## 2023-07-29 RX ADMIN — AMOXICILLIN AND CLAVULANATE POTASSIUM 1 TABLET: 875; 125 TABLET, FILM COATED ORAL at 08:15

## 2023-07-29 RX ADMIN — HYDROCODONE BITARTRATE AND ACETAMINOPHEN 1 TABLET: 10; 325 TABLET ORAL at 05:57

## 2023-07-29 RX ADMIN — LISINOPRIL 20 MG: 20 TABLET ORAL at 08:15

## 2023-07-29 RX ADMIN — FAMOTIDINE 20 MG: 20 TABLET ORAL at 08:15

## 2023-07-29 RX ADMIN — HYDROMORPHONE HYDROCHLORIDE 0.5 MG: 1 INJECTION, SOLUTION INTRAMUSCULAR; INTRAVENOUS; SUBCUTANEOUS at 23:07

## 2023-07-29 NOTE — PLAN OF CARE
Goal Outcome Evaluation:         Patient complained of pain and medicated per orders, had chest tube removed by pulmonology, sat up in chair for most of the day, and walked about 25 feet before supper.

## 2023-07-29 NOTE — PROGRESS NOTES
Pulmonary / Critical Care Progress Note      Patient Name: Sandra Sylvester  : 1945  MRN: [id number]  Primary Care Physician:  René Garrett MD  Date of admission: 2023    Subjective   Subjective   Follow-up for pleural effusion, large right-sided pleural effusion and small left-sided pleural effusion.    No acute events overnight.    This morning,   Lying in bed on 1 L nasal cannula  Dyspnea improving  Chest tube intact to -20 cm H2O suction  Approximately 120 mL drainage in last 24 hours  Tender around chest tube site  No chest pain  No fever or chills  Weak and fatigued    Review of Systems  General:  No Fatigue, No Fever  HEENT: No dysphagia, No Visual Changes, no rhinorrhea  Respiratory:  + Productive cough,+Dyspnea, Thick yellow phlegm, No Pleuritic Pain, + wheezing, no hemoptysis  Cardiovascular: Denies chest pain, denies palpitations,+GARZON, No Chest Pressure  Gastrointestinal:  No Abdominal Pain, No Nausea, No Vomiting, No Diarrhea  Genitourinary:  No Dysuria, No Frequency, No Hesitancy  Musculoskeletal: No muscle pain or swelling    Objective   Objective     Vitals:   Temp:  [97.3 øF (36.3 øC)-98.6 øF (37 øC)] 97.3 øF (36.3 øC)  Heart Rate:  [72-80] 80  Resp:  [18] 18  BP: (102-113)/(49-72) 102/49  Flow (L/min):  [1-2] 1    Physical Exam   Vital Signs Reviewed   General:  Alert, NAD. Thin frail female, lying in bed   HEENT:  PERRL, EOMI.    Neck:  No JVD, no thyromegaly  Lymph: no axillary, cervical, supraclavicular lymphadenopathy noted bilaterally  Chest:  Clear to auscultation bilaterally, no work of breathing noted on 1L NC; chest tube in place  CV: RRR, no M/G/R, pulses 2+  Abd:  Soft, NT, ND, +BS  EXT:  no clubbing, no cyanosis, no edema  Neuro:  A&Ox3, CN grossly intact, no focal deficits.  Skin: No rashes or lesions noted     Result Review    Result Review:  I have personally reviewed the results from the time of this admission to 2023 11:46 EDT and agree with these findings:  [x]   Laboratory  [x]  Microbiology  [x]  Radiology  [x]  EKG/Telemetry   [x]  Cardiology/Vascular   []  Pathology  []  Old records  []  Other:  Most notable findings include:     -7/25 pleural cultures negative to date        Lab 07/26/23  0858   WBC 14.50*   HEMOGLOBIN 13.7   HEMATOCRIT 43.6   PLATELETS 254   SODIUM 130*   POTASSIUM 4.7   CHLORIDE 95*   CO2 23.6   BUN 19   CREATININE 0.46*   GLUCOSE 125*   CALCIUM 8.9   PHOSPHORUS 3.7   ALBUMIN 3.2*     Assessment & Plan   Assessment / Plan     Active Hospital Problems:  Active Hospital Problems    Diagnosis     **PNA (pneumonia)     Hospital psychosis     Pleural effusion     COPD with acute exacerbation     Hypoxia     Severe malnutrition      Impression:   Large right-sided pleural effusion, possibly loculated  Small left-sided pleural effusion  Mediastinal lymphadenopathy, likely reactive  Anasarca  Chronic smoker  COPD  Possible pneumonia  Altered mental status    Plan:   -On 1 L nasal cannula.  Continue wean O2 to keep sats greater than 90%.  No home O2 requirement.  -Continue Augmentin to complete 14 days of therapy.  -Completed tPA/dornase x6 doses.  -Repeat CT scan with minimal residual right pleural effusion.  -Approximately 120 mL from chest tube last 24 hours.  -Discontinue chest tube using aseptic technique.  Patient tolerated well.  -Continue prednisone 40 mg p.o. daily.  -Continue nebulizers and bronchopulmonary hygiene.  -Encourage I-S and flutter valve.  -Encourage mobilization.  Out of bed to chair.  -PT/OT on board.    DVT prophylaxis:  Medical DVT prophylaxis orders are present.    CODE STATUS:   Level Of Support Discussed With: Patient  Code Status (Patient has no pulse and is not breathing): CPR (Attempt to Resuscitate)  Medical Interventions (Patient has pulse or is breathing): Full Support  Release to patient: Routine Release    I personally reviewed pertinent labs, imaging and provider notes. Discussed with bedside nurse and will discuss  with primary service.   Electronically signed by JHOAN Mendoza, 7/29/2023, 11:46 EDT.    This patient was seen by both a physician and a NP. I, Kassandra Houser MD, spent >50% of time in accordance with split shared billing. This included personally reviewing all pertinent labs, imaging, microbiology and documentation. Also discussing the case with the patient and any available family, the admitting physician and any available ancillary staff.   Electronically signed by Kassandra Houser MD, 07/29/23, 1:42 PM EDT.

## 2023-07-29 NOTE — PROGRESS NOTES
[facility]     Progress Note    Patient Name: Sandra Sylvester  : 1945  MRN: [id number]  Primary Care Physician:  René Garrett MD  Date of admission: 2023      Subjective   Brief summary.  Patient admitted with pneumonia.      HPI:  Patient admitted with pneumonia and pleural effusion.    Status post pleural tap and chest tube placement .  Getting tPA dornase.    Complains of tiredness and fatigue, overall breathing better    Review of Systems     Extremely weak  No fever chills  Some chest discomfort and back discomfort  No significant shortness of breath        Objective     Vitals:   Temp:  [97.3 øF (36.3 øC)-98.6 øF (37 øC)] 97.9 øF (36.6 øC)  Heart Rate:  [72-81] 81  Resp:  [18] 18  BP: (100-113)/(49-72) 100/50  Flow (L/min):  [1-2] 1    Physical Exam :     Elderly female cachectic and thinly built.    Heart is regular  Lungs diminished breath sounds bilaterally but clear  Chest tube in place  Abdomen soft nontender.  Neurologically awake alert and oriented.  Extremities no edema.      Result Review:  I have personally reviewed the results from the time of this admission to 2023 13:31 EDT and agree with these findings:  [x]  Laboratory  []  Microbiology  []  Radiology  []  EKG/Telemetry   []  Cardiology/Vascular   []  Pathology  []  Old records  []  Other:    Reviewed      Assessment / Plan       Active Hospital Problems:  Active Hospital Problems    Diagnosis     **PNA (pneumonia)     Hospital psychosis     Pleural effusion     COPD with acute exacerbation     Hypoxia     Severe malnutrition        Plan:   Patient post thoracentesis and chest tube placement  Plan to remove chest tube today  Continue PT OT  Encourage p.o. intake  Check labs  If remains stable possible discharge to home in couple of days, patient refused nursing home and inpatient rehab       DVT prophylaxis:  Medical DVT prophylaxis orders are present.    CODE STATUS:   Level Of Support Discussed With: Patient  Code  Status (Patient has no pulse and is not breathing): CPR (Attempt to Resuscitate)  Medical Interventions (Patient has pulse or is breathing): Full Support  Release to patient: Routine Release                Electronically signed by René Garrett MD, 07/29/23, 1:32 PM EDT.  .

## 2023-07-30 LAB — BACTERIA SPEC ANAEROBE CULT: NORMAL

## 2023-07-30 PROCEDURE — 25010000002 PROCHLORPERAZINE 10 MG/2ML SOLUTION: Performed by: INTERNAL MEDICINE

## 2023-07-30 PROCEDURE — 25010000002 ENOXAPARIN PER 10 MG: Performed by: INTERNAL MEDICINE

## 2023-07-30 PROCEDURE — 99233 SBSQ HOSP IP/OBS HIGH 50: CPT | Performed by: STUDENT IN AN ORGANIZED HEALTH CARE EDUCATION/TRAINING PROGRAM

## 2023-07-30 PROCEDURE — 25010000002 FUROSEMIDE PER 20 MG: Performed by: NURSE PRACTITIONER

## 2023-07-30 PROCEDURE — 63710000001 PREDNISONE PER 1 MG: Performed by: INTERNAL MEDICINE

## 2023-07-30 RX ORDER — PREDNISONE 20 MG/1
20 TABLET ORAL
Status: DISCONTINUED | OUTPATIENT
Start: 2023-08-03 | End: 2023-08-01 | Stop reason: HOSPADM

## 2023-07-30 RX ORDER — FUROSEMIDE 10 MG/ML
20 INJECTION INTRAMUSCULAR; INTRAVENOUS DAILY
Status: DISCONTINUED | OUTPATIENT
Start: 2023-07-30 | End: 2023-08-01

## 2023-07-30 RX ADMIN — AMOXICILLIN AND CLAVULANATE POTASSIUM 1 TABLET: 875; 125 TABLET, FILM COATED ORAL at 21:27

## 2023-07-30 RX ADMIN — AMOXICILLIN AND CLAVULANATE POTASSIUM 1 TABLET: 875; 125 TABLET, FILM COATED ORAL at 09:06

## 2023-07-30 RX ADMIN — Medication 10 ML: at 21:27

## 2023-07-30 RX ADMIN — LISINOPRIL 20 MG: 20 TABLET ORAL at 09:06

## 2023-07-30 RX ADMIN — PREDNISONE 40 MG: 20 TABLET ORAL at 09:06

## 2023-07-30 RX ADMIN — FUROSEMIDE 20 MG: 10 INJECTION, SOLUTION INTRAMUSCULAR; INTRAVENOUS at 13:00

## 2023-07-30 RX ADMIN — FAMOTIDINE 20 MG: 20 TABLET ORAL at 09:06

## 2023-07-30 RX ADMIN — PROCHLORPERAZINE EDISYLATE 5 MG: 5 INJECTION, SOLUTION INTRAMUSCULAR; INTRAVENOUS at 09:06

## 2023-07-30 RX ADMIN — QUETIAPINE FUMARATE 25 MG: 25 TABLET ORAL at 21:27

## 2023-07-30 RX ADMIN — ENOXAPARIN SODIUM 30 MG: 100 INJECTION SUBCUTANEOUS at 09:06

## 2023-07-30 RX ADMIN — Medication 1 TABLET: at 09:06

## 2023-07-30 RX ADMIN — NYSTATIN 500000 UNITS: 100000 SUSPENSION ORAL at 21:27

## 2023-07-30 RX ADMIN — Medication 10 ML: at 09:07

## 2023-07-30 RX ADMIN — HYDROCODONE BITARTRATE AND ACETAMINOPHEN 1 TABLET: 10; 325 TABLET ORAL at 09:06

## 2023-07-30 RX ADMIN — ATORVASTATIN CALCIUM 20 MG: 20 TABLET, FILM COATED ORAL at 21:27

## 2023-07-30 RX ADMIN — METOPROLOL SUCCINATE 25 MG: 25 TABLET, EXTENDED RELEASE ORAL at 09:06

## 2023-07-30 NOTE — PLAN OF CARE
Goal Outcome Evaluation:      Patient complained of pain x1, and medicated per orders, sat in the chair for breakfast then went back to bed related to coccyx hurting, family visiting at bedside.

## 2023-07-30 NOTE — PROGRESS NOTES
[facility]     Progress Note    Patient Name: Sandra Sylvester  : 1945  MRN: [id number]  Primary Care Physician:  René Garrett MD  Date of admission: 2023      Subjective   Brief summary.  Patient admitted with pneumonia.      HPI:  Patient admitted with pneumonia and pleural effusion.    Feeling better, chest tube.  Still having a lot of back pain and issues with movement and mobility    Review of Systems     Complains of weakness and fatigue  No fever chills  Complains of back pain  No significant shortness of breath        Objective     Vitals:   Temp:  [97.7 øF (36.5 øC)-99.1 øF (37.3 øC)] 97.9 øF (36.6 øC)  Heart Rate:  [64-96] 78  Resp:  [16-18] 18  BP: (100-127)/(41-72) 103/45  Flow (L/min):  [1] 1    Physical Exam :     Elderly female cachectic and thinly built.    Heart is regular  Lungs diminished breath sounds but clear bilaterally  Abdomen soft nontender.  Neurologically awake alert and oriented.  Extremities no edema.  Kyphoscoliosis noted      Result Review:  I have personally reviewed the results from the time of this admission to 2023 14:05 EDT and agree with these findings:  [x]  Laboratory  []  Microbiology  []  Radiology  []  EKG/Telemetry   []  Cardiology/Vascular   []  Pathology  []  Old records  []  Other:    Reviewed      Assessment / Plan       Active Hospital Problems:  Active Hospital Problems    Diagnosis     **PNA (pneumonia)     Hospital psychosis     Pleural effusion     COPD with acute exacerbation     Hypoxia     Severe malnutrition        Plan:   Patient doing better, chest tube removed, repeat chest x-ray in a.m.  Increase activity with PT and OT.  Refusing nursing home or rehab.  At this point pain control with narcotics.  Possible discharge to home in couple of days    DVT prophylaxis:  Medical DVT prophylaxis orders are present.    CODE STATUS:   Level Of Support Discussed With: Patient  Code Status (Patient has no pulse and is not breathing): CPR  (Attempt to Resuscitate)  Medical Interventions (Patient has pulse or is breathing): Full Support  Release to patient: Routine Release                Electronically signed by René Garrett MD, 07/30/23, 2:06 PM EDT.    .

## 2023-07-30 NOTE — PROGRESS NOTES
Pulmonary / Critical Care Progress Note      Patient Name: Sandra Sylvester  : 1945  MRN: [id number]  Primary Care Physician:  René Garrett MD  Date of admission: 2023    Subjective   Subjective   Follow-up for pleural effusion, large right-sided pleural effusion and small left-sided pleural effusion.    No acute events overnight.    This morning,   Doing well this morning  Currently on 1 L nasal cannula  Patient reports that she does not use oxygen at home  Patient also reports that she does not want to do rehab  Chest tube removed yesterday  No other acute issues at this time    Review of Systems  General:  No Fatigue, No Fever  HEENT: No dysphagia, No Visual Changes, no rhinorrhea  Respiratory:  + Productive cough,+Dyspnea, Thick yellow phlegm; no hemoptysis  Cardiovascular: Denies chest pain, denies palpitations,+GARZON, No Chest Pressure  Gastrointestinal:  No Abdominal Pain, No Nausea, No Vomiting, No Diarrhea  Genitourinary:  No Dysuria, No Frequency, No Hesitancy  Musculoskeletal: No muscle pain or swelling    Objective   Objective     Vitals:   Temp:  [97.7 øF (36.5 øC)-99.1 øF (37.3 øC)] 97.9 øF (36.6 øC)  Heart Rate:  [64-96] 78  Resp:  [16-18] 18  BP: (100-127)/(41-72) 103/45  Flow (L/min):  [1] 1    Physical Exam   Vital Signs Reviewed   General:  Alert, NAD. Thin frail female, lying in bed   HEENT:  PERRL, EOMI.    Neck:  No JVD, no thyromegaly  Lymph: no axillary, cervical, supraclavicular lymphadenopathy noted bilaterally  Chest:  Clear to auscultation bilaterally, no work of breathing noted on 1L NC  CV: RRR, no M/G/R, pulses 2+  Abd:  Soft, NT, ND, +BS  EXT:  no clubbing, no cyanosis, no edema  Neuro:  A&Ox3, CN grossly intact, no focal deficits.  Skin: No rashes or lesions noted     Result Review    Result Review:  I have personally reviewed the results from the time of this admission to 2023 14:17 EDT and agree with these findings:  [x]  Laboratory  [x]  Microbiology  [x]   Radiology  [x]  EKG/Telemetry   [x]  Cardiology/Vascular   []  Pathology  []  Old records  []  Other:  Most notable findings include:     -7/25 pleural cultures negative to date        Lab 07/26/23  0858   WBC 14.50*   HEMOGLOBIN 13.7   HEMATOCRIT 43.6   PLATELETS 254   SODIUM 130*   POTASSIUM 4.7   CHLORIDE 95*   CO2 23.6   BUN 19   CREATININE 0.46*   GLUCOSE 125*   CALCIUM 8.9   PHOSPHORUS 3.7   ALBUMIN 3.2*     Assessment & Plan   Assessment / Plan     Active Hospital Problems:  Active Hospital Problems    Diagnosis     **PNA (pneumonia)     Hospital psychosis     Pleural effusion     COPD with acute exacerbation     Hypoxia     Severe malnutrition      Impression:   Large right-sided pleural effusion, possibly loculated  Small left-sided pleural effusion  Mediastinal lymphadenopathy, likely reactive  Anasarca  Chronic smoker  COPD  Possible pneumonia  Altered mental status    Plan:   -On 1 L nasal cannula.  Continue wean O2 to keep sats greater than 90%.  No home O2 requirement.  -Chest tube removed 7/29  -Completed tPA/dornase x6 doses.  -Repeat CT scan with minimal residual right pleural effusion.  -Start low dose diuresis with lasix 20mg IV qd; assess daily  -Repeat CXR in AM  -Continue Augmentin to complete 14 days of therapy.  -Continue prednisone 40 mg p.o. daily.  -Continue nebulizers and bronchopulmonary hygiene.  -Encourage I-S and flutter valve.  -Encourage mobilization.  Out of bed to chair.  -PT/OT on board.    DVT prophylaxis:  Medical DVT prophylaxis orders are present.    CODE STATUS:   Level Of Support Discussed With: Patient  Code Status (Patient has no pulse and is not breathing): CPR (Attempt to Resuscitate)  Medical Interventions (Patient has pulse or is breathing): Full Support  Release to patient: Routine Release    Electronically signed by Kassandra Houser MD, 07/30/23, 2:26 PM EDT.

## 2023-07-31 ENCOUNTER — APPOINTMENT (OUTPATIENT)
Dept: GENERAL RADIOLOGY | Facility: HOSPITAL | Age: 78
DRG: 193 | End: 2023-07-31
Payer: MEDICARE

## 2023-07-31 PROCEDURE — 99232 SBSQ HOSP IP/OBS MODERATE 35: CPT | Performed by: INTERNAL MEDICINE

## 2023-07-31 PROCEDURE — 25010000002 ENOXAPARIN PER 10 MG: Performed by: INTERNAL MEDICINE

## 2023-07-31 PROCEDURE — 97116 GAIT TRAINING THERAPY: CPT

## 2023-07-31 PROCEDURE — 71045 X-RAY EXAM CHEST 1 VIEW: CPT

## 2023-07-31 PROCEDURE — 25010000002 FUROSEMIDE PER 20 MG: Performed by: NURSE PRACTITIONER

## 2023-07-31 PROCEDURE — 63710000001 PREDNISONE PER 1 MG: Performed by: NURSE PRACTITIONER

## 2023-07-31 PROCEDURE — 63710000001 PREDNISONE PER 5 MG: Performed by: NURSE PRACTITIONER

## 2023-07-31 RX ADMIN — FUROSEMIDE 20 MG: 10 INJECTION, SOLUTION INTRAMUSCULAR; INTRAVENOUS at 09:12

## 2023-07-31 RX ADMIN — AMOXICILLIN AND CLAVULANATE POTASSIUM 1 TABLET: 875; 125 TABLET, FILM COATED ORAL at 09:07

## 2023-07-31 RX ADMIN — SENNOSIDES AND DOCUSATE SODIUM 2 TABLET: 50; 8.6 TABLET ORAL at 09:08

## 2023-07-31 RX ADMIN — NYSTATIN 500000 UNITS: 100000 SUSPENSION ORAL at 18:27

## 2023-07-31 RX ADMIN — ATORVASTATIN CALCIUM 20 MG: 20 TABLET, FILM COATED ORAL at 20:21

## 2023-07-31 RX ADMIN — NYSTATIN 500000 UNITS: 100000 SUSPENSION ORAL at 09:08

## 2023-07-31 RX ADMIN — Medication 10 ML: at 09:18

## 2023-07-31 RX ADMIN — AMOXICILLIN AND CLAVULANATE POTASSIUM 1 TABLET: 875; 125 TABLET, FILM COATED ORAL at 20:21

## 2023-07-31 RX ADMIN — SENNOSIDES AND DOCUSATE SODIUM 2 TABLET: 50; 8.6 TABLET ORAL at 20:21

## 2023-07-31 RX ADMIN — ENOXAPARIN SODIUM 30 MG: 100 INJECTION SUBCUTANEOUS at 09:08

## 2023-07-31 RX ADMIN — QUETIAPINE FUMARATE 25 MG: 25 TABLET ORAL at 20:21

## 2023-07-31 RX ADMIN — PREDNISONE 30 MG: 20 TABLET ORAL at 09:07

## 2023-07-31 RX ADMIN — HYDROCODONE BITARTRATE AND ACETAMINOPHEN 1 TABLET: 10; 325 TABLET ORAL at 00:38

## 2023-07-31 RX ADMIN — METOPROLOL SUCCINATE 25 MG: 25 TABLET, EXTENDED RELEASE ORAL at 09:08

## 2023-07-31 RX ADMIN — HYDROCODONE BITARTRATE AND ACETAMINOPHEN 1 TABLET: 10; 325 TABLET ORAL at 04:53

## 2023-07-31 RX ADMIN — HYDROCODONE BITARTRATE AND ACETAMINOPHEN 1 TABLET: 10; 325 TABLET ORAL at 20:21

## 2023-07-31 RX ADMIN — HYDROCODONE BITARTRATE AND ACETAMINOPHEN 1 TABLET: 10; 325 TABLET ORAL at 12:23

## 2023-07-31 RX ADMIN — LISINOPRIL 20 MG: 20 TABLET ORAL at 09:08

## 2023-07-31 RX ADMIN — FAMOTIDINE 20 MG: 20 TABLET ORAL at 09:08

## 2023-07-31 RX ADMIN — Medication 1 TABLET: at 09:07

## 2023-07-31 NOTE — CONSULTS
"Nutrition Services    Patient Name: Sandra Sylvester  YOB: 1945  MRN: [id number]  Admission date: 7/19/2023      CLINICAL NUTRITION ASSESSMENT      Reason for Assessment  Follow-up protocol     H&P:    Past Medical History:   Diagnosis Date    COPD (chronic obstructive pulmonary disease)     Hyperlipidemia     Hypertension         Current Problems:   Active Hospital Problems    Diagnosis     **PNA (pneumonia)     Hospital psychosis     Pleural effusion     COPD with acute exacerbation     Hypoxia     Severe malnutrition         Nutrition/Diet History         Narrative     78 year old female admitted with SOA/pneumonia.  See PMH above.    Intake improved following thoracentesis/chest tube placement.  Chest tube removed 7/29/23.      Feeds self Cardiac Diet, Regular Texture , Thin Fluids. Intake has decreased to 25-50% of meals.     Noted with a rash to skin on lower portion of body and upper legs.  Wayne Score = 16  Nutrition Acuity Risk Score = 10, high risk for decline.  NFPE previously conducted by MICHEL.  Pt met criteria for Severe Malnutrition.    RD requested reweigh this day and current wt is 64.68,  9.5# in past week.  Note pt was extremely SOB when admitted r/t pneumonia and had a chest tube places with 675 cc drainage.       Receives Boost + with all meals.       Anthropometrics        Current Height, Weight Height: 149.9 cm (59\")  Weight: 29.6 kg (65 lb 4.1 oz)   Current BMI Body mass index is 13.18 kg/mý.       Weight Hx  Wt Readings from Last 30 Encounters:   07/27/23 1254 29.6 kg (65 lb 4.1 oz)   07/20/23 0112 33.6 kg (74 lb 1.2 oz)   07/19/23 1850 34.1 kg (75 lb 2.8 oz)   01/13/23 0600 38.4 kg (84 lb 10.5 oz)   01/12/23 0555 37.6 kg (82 lb 14.3 oz)   01/11/23 0625 38.4 kg (84 lb 10.5 oz)   01/10/23 0452 38.9 kg (85 lb 12.1 oz)   01/10/23 0155 38.9 kg (85 lb 12.1 oz)   01/09/23 2025 42 kg (92 lb 9.5 oz)   09/01/22 1426 40.8 kg (90 lb)   08/01/22 1431 40.8 kg (90 lb)   06/07/22 1016 40.8 kg " (90 lb)   05/07/22 1924 40.9 kg (90 lb 2.7 oz)            Wt Change Observation Wt decrease 18.7#  (22%) in six months     Estimated/Assessed Needs       Energy Requirements #/48.8kg   EST Needs (kcal/day) 30-35 kcals/kg = 1464 - 1708 calories       Protein Requirements    EST Daily Needs (g/day) 1.0-1.2 g/kg = 48.8- 58 grams       Fluid Requirements     Estimated Needs (mL/day) 30-35 ml/kg = 1464 - 1708 ml  (6-7 cups)     Labs/Medications         Pertinent Labs Reviewed.   Results from last 7 days   Lab Units 07/26/23  0858   SODIUM mmol/L 130*   POTASSIUM mmol/L 4.7   CHLORIDE mmol/L 95*   CO2 mmol/L 23.6   BUN mg/dL 19   CREATININE mg/dL 0.46*   CALCIUM mg/dL 8.9   GLUCOSE mg/dL 125*       Results from last 7 days   Lab Units 07/26/23  0858   MAGNESIUM mg/dL 2.4   PHOSPHORUS mg/dL 3.7   HEMOGLOBIN g/dL 13.7   HEMATOCRIT % 43.6       COVID19   Date Value Ref Range Status   07/19/2023 Not Detected Not Detected - Ref. Range Final     No results found for: HGBA1C      Pertinent Medications Reviewed.     Current Nutrition Orders & Evaluation of Intake       Oral Nutrition     Current PO Diet Diet: Cardiac Diets; Healthy Heart (2-3 Na+); Texture: Regular Texture (IDDSI 7); Fluid Consistency: Thin (IDDSI 0)   Supplement Orders Placed This Encounter      Dietary Nutrition Supplements Boost Plus (Ensure Plus)       Malnutrition Severity Assessment      Patient meets criteria for : Severe Malnutrition           Nutrition Diagnosis         Nutrition Dx Problem 1 Severe malnutrition related to inadequate energy Intake as evidenced by unintended wt change. and body composition changes.       Nutrition Intervention         Cardiac Diet, Regular Texture, Thin liquids  Boost Plus - 1 carton with  meals. +1080 calories, 42 g protein     Medical Nutrition Therapy/Nutrition Education          Learner     Readiness N/A  N/A     Method     Response N/A  N/A     Monitor/Evaluation        Monitor Per protocol, PO intake,  Supplement intake, Pertinent labs, Weight, POC/GOC       Nutrition Discharge Plan         Pt will need to continue ONS at least 3 per day upond discharge       Electronically signed by:  Jannette Rankin RD  07/31/23 14:16 EDT

## 2023-07-31 NOTE — PLAN OF CARE
Goal Outcome Evaluation:  Plan of Care Reviewed With: patient        Progress: no change  Outcome Evaluation: Pt frequently turned throughout shift. Pt medicated PRN for pain. Skin care provided. Bed alarm on. No new issues/needs noted at this time.

## 2023-07-31 NOTE — PLAN OF CARE
Goal Outcome Evaluation:  Plan of Care Reviewed With: patient, daughter        Progress: no change  Outcome Evaluation: patient is alert and oriented, but can be intermittently confused. medicated with prn pain medication. iv removed and not replaced, MD notified. encouraged patient to turn frequently in bed to prevent skin breakdown no other changes at this time.

## 2023-07-31 NOTE — PROGRESS NOTES
[facility]     Progress Note    Patient Name: Sandra Sylvester  : 1945  MRN: [id number]  Primary Care Physician:  René Garrett MD  Date of admission: 2023      Subjective   Brief summary.  Patient admitted with pneumonia.      HPI:  Patient admitted with pneumonia and pleural effusion.    Patient had chest tube and drainage for few days, chest tube removed 2 days ago feeling much better today, wants to go home    Review of Systems     Back pain  No fever chills  Shortness of breath better  No chest discomfort        Objective     Vitals:   Temp:  [97.7 øF (36.5 øC)-99 øF (37.2 øC)] 98.2 øF (36.8 øC)  Heart Rate:  [78-97] 78  Resp:  [14-18] 18  BP: ()/(49-70) 108/59  Flow (L/min):  [1] 1    Physical Exam :     Elderly female cachectic and thinly built.    Heart is regular  Lungs diminished breath sounds but clear bilaterally  Abdomen soft nontender.  Neurologically awake alert and oriented.  Extremities no edema.  Kyphoscoliosis noted      Result Review:  I have personally reviewed the results from the time of this admission to 2023 18:55 EDT and agree with these findings:  [x]  Laboratory  []  Microbiology  []  Radiology  []  EKG/Telemetry   []  Cardiology/Vascular   []  Pathology  []  Old records  []  Other:    Reviewed      Assessment / Plan       Active Hospital Problems:  Active Hospital Problems    Diagnosis     **PNA (pneumonia)     Hospital psychosis     Pleural effusion     COPD with acute exacerbation     Hypoxia     Severe malnutrition        Plan:   Patient doing better, chest tube removed, 2 days ago.  Continue steroids and antibiotics.  Repeat x-ray shows improvement  Possible discharge tomorrow, refusing inpatient rehab or nursing placement    DVT prophylaxis:  Medical DVT prophylaxis orders are present.    CODE STATUS:   Level Of Support Discussed With: Patient  Code Status (Patient has no pulse and is not breathing): CPR (Attempt to Resuscitate)  Medical  Interventions (Patient has pulse or is breathing): Full Support  Release to patient: Routine Release                  Electronically signed by René Garrett MD, 07/31/23, 6:56 PM EDT.  .    .

## 2023-07-31 NOTE — THERAPY TREATMENT NOTE
Acute Care - Physical Therapy Treatment Note  SYDNIE Stokes     Patient Name: Sandra Sylvester  : 1945  MRN: [id number]  Today's Date: 2023      Visit Dx:     ICD-10-CM ICD-9-CM   1. Pneumonia due to infectious organism, unspecified laterality, unspecified part of lung  J18.9 486   2. Acute respiratory failure with hypoxia  J96.01 518.81   3. Decreased activities of daily living (ADL)  Z78.9 V49.89   4. Difficulty walking  R26.2 719.7     Patient Active Problem List   Diagnosis    Closed fracture of right hip    Closed nondisplaced fracture of acetabulum    HTN (hypertension)    Malnourished    BMI less than 19,adult    Severe malnutrition    PNA (pneumonia)    COPD with acute exacerbation    Hypoxia    Pleural effusion    Hospital psychosis     Past Medical History:   Diagnosis Date    COPD (chronic obstructive pulmonary disease)     Hyperlipidemia     Hypertension      Past Surgical History:   Procedure Laterality Date    CHOLECYSTECTOMY       PT Assessment (last 12 hours)       PT Evaluation and Treatment       Row Name 23 1000          Physical Therapy Time and Intention    Subjective Information complains of;weakness;pain (P)   -MB     Document Type therapy note (daily note) (P)   -MB     Mode of Treatment individual therapy;physical therapy (P)   -MB     Patient Effort excellent (P)   -MB     Symptoms Noted During/After Treatment fatigue (P)   -MB       Row Name 23 1000          Bed Mobility    Bed Mobility bed mobility (all) activities (P)   -MB     All Activities, Early (Bed Mobility) standby assist (P)   -MB       Row Name 23 1000          Transfers    Transfers sit-stand transfer;stand-sit transfer (P)   -MB       Row Name 23 1000          Sit-Stand Transfer    Sit-Stand Early (Transfers) standby assist (P)   -MB     Assistive Device (Sit-Stand Transfers) walker, front-wheeled (P)   -MB       Row Name 23 1000          Stand-Sit Transfer    Stand-Sit  [place] (Transfers) standby assist (P)   -MB     Assistive Device (Stand-Sit Transfers) walker, front-wheeled (P)   -MB       Row Name 07/31/23 1000          Gait/Stairs (Locomotion)    Gait/Stairs Locomotion gait/ambulation independence;gait/ambulation assistive device;distance ambulated (P)   -MB     [place] Level (Gait) standby assist (P)   -MB     Assistive Device (Gait) walker, front-wheeled (P)   -MB     Distance in Feet (Gait) 100 (P)   -MB     Deviations/Abnormal Patterns (Gait) gait speed decreased;stride length decreased (P)   -MB       Row Name 07/31/23 1000          Safety Issues, Functional Mobility    Impairments Affecting Function (Mobility) balance;strength;endurance/activity tolerance;pain (P)   -MB       Row Name 07/31/23 1000          Balance    Balance Assessment standing dynamic balance (P)   -MB     Dynamic Standing Balance standby assist (P)   -MB     Position/Device Used, Standing Balance walker, front-wheeled (P)   -MB       Row Name             Wound 07/20/23 0300 Right lower arm Skin Tear    Wound - Properties Group Placement Date: 07/20/23  -VS Placement Time: 0300  -VS Present on Hospital Admission: Y  -VS Side: Right  -VS Orientation: lower  -VS Location: arm  -VS Primary Wound Type: Skin tear  -VS    Retired Wound - Properties Group Placement Date: 07/20/23  -VS Placement Time: 0300  -VS Present on Hospital Admission: Y  -VS Side: Right  -VS Orientation: lower  -VS Location: arm  -VS Primary Wound Type: Skin tear  -VS    Retired Wound - Properties Group Date first assessed: 07/20/23  -VS Time first assessed: 0300  -VS Present on Hospital Admission: Y  -VS Side: Right  -VS Location: arm  -VS Primary Wound Type: Skin tear  -VS      Row Name             Wound 07/22/23 0645 Left lower arm    Wound - Properties Group Placement Date: 07/22/23  -DK Placement Time: 0645  -DK Side: Left  -DK Orientation: lower  -DK Location: arm  -DK    Retired Wound - Properties Group Placement Date:  07/22/23  -DK Placement Time: 0645 -DK Side: Left  -DK Orientation: lower  -DK Location: arm  -DK    Retired Wound - Properties Group Date first assessed: 07/22/23  -DK Time first assessed: 0645 -DK Side: Left  -DK Location: arm  -DK      Row Name             Wound Right posterior back Incision    Wound - Properties Group Side: Right  -HL Orientation: posterior  -HL Location: back  -HL Primary Wound Type: Incision  -HL Additional Comments: Area from chest tube.  -HL    Retired Wound - Properties Group Side: Right  -HL Orientation: posterior  -HL Location: back  -HL Primary Wound Type: Incision  -HL Additional Comments: Area from chest tube.  -HL    Retired Wound - Properties Group Side: Right  -HL Location: back  -HL Primary Wound Type: Incision  -HL Additional Comments: Area from chest tube.  -HL      Row Name 07/31/23 1000          Plan of Care Review    Plan of Care Reviewed With patient (P)   -MB     Progress improving (P)   -MB       Row Name 07/31/23 1000          Positioning and Restraints    Pre-Treatment Position in bed (P)   -MB     Post Treatment Position bed (P)   -MB     In Bed supine (P)   -MB       Row Name 07/31/23 1000          Progress Summary (PT)    Progress Toward Functional Goals (PT) progress toward functional goals is good (P)   -MB     Daily Progress Summary (PT) Pt tolerated treatment well today. She demonstrates good progress toward meeting her goals and will continue to benefit from skilled PT intervention. (P)   -MB               User Key  (r) = Recorded By, (t) = Taken By, (c) = Cosigned By      Initials Name Provider Type     Mishel Pimentel, RN Registered Nurse    Sage Walls, RN Registered Nurse     Radha Marinelli RN Registered Nurse    Philipp Mcdonald, PT Student PT Student                    Physical Therapy Education       Title: PT OT SLP Therapies (In Progress)       Topic: Physical Therapy (In Progress)       Point: Mobility training (In Progress)       Learning  Progress Summary             Patient Acceptance, E, NR by  at 7/21/2023 1542                         Point: Home exercise program (Not Started)       Learner Progress:  Not documented in this visit.              Point: Body mechanics (Not Started)       Learner Progress:  Not documented in this visit.              Point: Precautions (In Progress)       Learning Progress Summary             Patient Acceptance, E, NR by  at 7/21/2023 1542                                         User Key       Initials Effective Dates Name Provider Type Discipline     04/25/21 -  Rianna Lopez, PT Physical Therapist PT                  PT Recommendation and Plan     Progress Summary (PT)  Progress Toward Functional Goals (PT): (P) progress toward functional goals is good  Daily Progress Summary (PT): (P) Pt tolerated treatment well today. She demonstrates good progress toward meeting her goals and will continue to benefit from skilled PT intervention.  Plan of Care Reviewed With: (P) patient  Progress: (P) improving   Outcome Measures       Row Name 07/31/23 1000             How much help from another person do you currently need...    Turning from your back to your side while in flat bed without using bedrails? 4 (P)   -MB      Moving from lying on back to sitting on the side of a flat bed without bedrails? 4 (P)   -MB      Moving to and from a bed to a chair (including a wheelchair)? 4 (P)   -MB      Standing up from a chair using your arms (e.g., wheelchair, bedside chair)? 4 (P)   -MB      Climbing 3-5 steps with a railing? 3 (P)   -MB      To walk in hospital room? 3 (P)   -MB      AM-PAC 6 Clicks Score (PT) 22 (P)   -MB                User Key  (r) = Recorded By, (t) = Taken By, (c) = Cosigned By      Initials Name Provider Type    Philipp Mcdonald, PT Student PT Student                     Time Calculation:    PT Charges       Row Name 07/31/23 1044             Time Calculation    PT Received On 07/31/23 (P)   -MB          Timed Charges    35884 - Gait Training Minutes  15 (P)   -MB         Total Minutes    Timed Charges Total Minutes 15 (P)   -MB       Total Minutes 15 (P)   -MB                User Key  (r) = Recorded By, (t) = Taken By, (c) = Cosigned By      Initials Name Provider Type    Philipp Mcdonald, PT Student PT Student                      PT G-Codes  Outcome Measure Options: AM-PAC 6 Clicks Daily Activity (OT), Optimal Instrument  AM-PAC 6 Clicks Score (PT): (P) 22  AM-PAC 6 Clicks Score (OT): 21    Philipp Hubbard, PT Student  7/31/2023

## 2023-07-31 NOTE — PROGRESS NOTES
Pulmonary / Critical Care Progress Note      Patient Name: Sandra Sylvester  : 1945  MRN: [id number]  Primary Care Physician:  René Garrett MD  Date of admission: 2023    Subjective   Subjective   Follow-up for pleural effusion, large right-sided pleural effusion and small left-sided pleural effusion.    No acute events overnight.    This morning,   Doing well this morning  Currently on 1 L nasal cannula  Feels better    Review of Systems  General:  No Fatigue, No Fever  HEENT: No dysphagia, No Visual Changes, no rhinorrhea  Respiratory:  + Productive cough,+Dyspnea, Thick yellow phlegm; no hemoptysis  Cardiovascular: Denies chest pain, denies palpitations,+GARZON, No Chest Pressure  Gastrointestinal:  No Abdominal Pain, No Nausea, No Vomiting, No Diarrhea  Genitourinary:  No Dysuria, No Frequency, No Hesitancy  Musculoskeletal: No muscle pain or swelling    Objective   Objective     Vitals:   Temp:  [97.7 øF (36.5 øC)-99 øF (37.2 øC)] 98.2 øF (36.8 øC)  Heart Rate:  [78-97] 78  Resp:  [14-18] 18  BP: ()/(49-70) 108/59  Flow (L/min):  [1] 1    Physical Exam   Vital Signs Reviewed   General:  Alert, NAD. Thin frail female, lying in bed   HEENT:  PERRL, EOMI.    Neck:  No JVD, no thyromegaly  Lymph: no axillary, cervical, supraclavicular lymphadenopathy noted bilaterally  Chest:  Clear to auscultation bilaterally, no work of breathing noted on 1L NC  CV: RRR, no M/G/R, pulses 2+  Abd:  Soft, NT, ND, +BS  EXT:  no clubbing, no cyanosis, no edema  Neuro:  A&Ox3, CN grossly intact, no focal deficits.  Skin: No rashes or lesions noted     Result Review    Result Review:  I have personally reviewed the results from the time of this admission to 2023 18:36 EDT and agree with these findings:  [x]  Laboratory  [x]  Microbiology  [x]  Radiology  [x]  EKG/Telemetry   [x]  Cardiology/Vascular   []  Pathology  []  Old records  []  Other:  Most notable findings include:     - pleural cultures negative to  date        Lab 07/26/23  0858   WBC 14.50*   HEMOGLOBIN 13.7   HEMATOCRIT 43.6   PLATELETS 254   SODIUM 130*   POTASSIUM 4.7   CHLORIDE 95*   CO2 23.6   BUN 19   CREATININE 0.46*   GLUCOSE 125*   CALCIUM 8.9   PHOSPHORUS 3.7   ALBUMIN 3.2*     Assessment & Plan   Assessment / Plan     Active Hospital Problems:  Active Hospital Problems    Diagnosis     **PNA (pneumonia)     Hospital psychosis     Pleural effusion     COPD with acute exacerbation     Hypoxia     Severe malnutrition      Impression:   Large right-sided pleural effusion, possibly loculated  Small left-sided pleural effusion  Mediastinal lymphadenopathy, likely reactive  Anasarca  Chronic smoker  COPD  Possible pneumonia  Altered mental status    Plan:     -Continue Augmentin to complete 14 days of therapy.  -Continue prednisone 40 mg p.o. daily.  -Continue nebulizers and bronchopulmonary hygiene.  -Encourage I-S and flutter valve.  -Encourage mobilization.  Out of bed to chair.  -PT/OT on board.    DVT prophylaxis:  Medical DVT prophylaxis orders are present.    CODE STATUS:   Level Of Support Discussed With: Patient  Code Status (Patient has no pulse and is not breathing): CPR (Attempt to Resuscitate)  Medical Interventions (Patient has pulse or is breathing): Full Support  Release to patient: Routine Release    Electronically signed by Pancho Rodriguez DO, 07/31/23, 6:36 PM EDT.

## 2023-08-01 ENCOUNTER — READMISSION MANAGEMENT (OUTPATIENT)
Dept: CALL CENTER | Facility: HOSPITAL | Age: 78
End: 2023-08-01
Payer: MEDICARE

## 2023-08-01 VITALS
DIASTOLIC BLOOD PRESSURE: 59 MMHG | RESPIRATION RATE: 18 BRPM | HEART RATE: 79 BPM | BODY MASS INDEX: 13.16 KG/M2 | OXYGEN SATURATION: 98 % | SYSTOLIC BLOOD PRESSURE: 114 MMHG | TEMPERATURE: 97.2 F | WEIGHT: 65.26 LBS | HEIGHT: 59 IN

## 2023-08-01 PROBLEM — J18.9 PNA (PNEUMONIA): Status: RESOLVED | Noted: 2023-01-01 | Resolved: 2023-01-01

## 2023-08-01 PROBLEM — J90 PLEURAL EFFUSION: Status: RESOLVED | Noted: 2023-07-24 | Resolved: 2023-08-01

## 2023-08-01 PROBLEM — J44.1 COPD WITH ACUTE EXACERBATION: Status: RESOLVED | Noted: 2023-01-01 | Resolved: 2023-01-01

## 2023-08-01 PROBLEM — F23: Status: RESOLVED | Noted: 2023-01-01 | Resolved: 2023-01-01

## 2023-08-01 LAB
FUNGUS WND CULT: NORMAL
MYCOBACTERIUM SPEC CULT: NORMAL
NIGHT BLUE STAIN TISS: NORMAL

## 2023-08-01 PROCEDURE — 97116 GAIT TRAINING THERAPY: CPT

## 2023-08-01 PROCEDURE — 63710000001 PREDNISONE PER 1 MG: Performed by: NURSE PRACTITIONER

## 2023-08-01 PROCEDURE — 63710000001 PREDNISONE PER 5 MG: Performed by: NURSE PRACTITIONER

## 2023-08-01 PROCEDURE — 97110 THERAPEUTIC EXERCISES: CPT

## 2023-08-01 PROCEDURE — 97164 PT RE-EVAL EST PLAN CARE: CPT

## 2023-08-01 PROCEDURE — 25010000002 ENOXAPARIN PER 10 MG: Performed by: INTERNAL MEDICINE

## 2023-08-01 RX ORDER — IPRATROPIUM BROMIDE AND ALBUTEROL SULFATE 2.5; .5 MG/3ML; MG/3ML
3 SOLUTION RESPIRATORY (INHALATION) EVERY 6 HOURS PRN
Qty: 360 ML | Refills: 1 | Status: SHIPPED | OUTPATIENT
Start: 2023-08-01 | End: 2023-08-31

## 2023-08-01 RX ORDER — FUROSEMIDE 20 MG/1
20 TABLET ORAL DAILY
Qty: 30 TABLET | Refills: 0 | Status: SHIPPED | OUTPATIENT
Start: 2023-08-02 | End: 2023-09-01

## 2023-08-01 RX ORDER — FAMOTIDINE 20 MG/1
20 TABLET, FILM COATED ORAL DAILY
Qty: 30 TABLET | Refills: 0 | Status: SHIPPED | OUTPATIENT
Start: 2023-08-02 | End: 2023-09-01

## 2023-08-01 RX ORDER — AMOXICILLIN AND CLAVULANATE POTASSIUM 875; 125 MG/1; MG/1
1 TABLET, FILM COATED ORAL EVERY 12 HOURS SCHEDULED
Qty: 11 TABLET | Refills: 0 | Status: SHIPPED | OUTPATIENT
Start: 2023-08-01 | End: 2023-08-07

## 2023-08-01 RX ORDER — FUROSEMIDE 20 MG/1
20 TABLET ORAL DAILY
Status: DISCONTINUED | OUTPATIENT
Start: 2023-08-01 | End: 2023-08-01 | Stop reason: HOSPADM

## 2023-08-01 RX ORDER — HYDROCODONE BITARTRATE AND ACETAMINOPHEN 5; 325 MG/1; MG/1
1 TABLET ORAL EVERY 8 HOURS PRN
Qty: 45 TABLET | Refills: 0 | Status: SHIPPED | OUTPATIENT
Start: 2023-08-01 | End: 2023-08-16

## 2023-08-01 RX ORDER — PREDNISONE 10 MG/1
TABLET ORAL
Qty: 11 TABLET | Refills: 0 | Status: SHIPPED | OUTPATIENT
Start: 2023-08-03 | End: 2023-08-12

## 2023-08-01 RX ORDER — METOPROLOL SUCCINATE 25 MG/1
25 TABLET, EXTENDED RELEASE ORAL DAILY
Qty: 30 TABLET | Refills: 0 | Status: SHIPPED | OUTPATIENT
Start: 2023-08-01 | End: 2023-08-31

## 2023-08-01 RX ADMIN — METOPROLOL SUCCINATE 25 MG: 25 TABLET, EXTENDED RELEASE ORAL at 08:15

## 2023-08-01 RX ADMIN — LISINOPRIL 20 MG: 20 TABLET ORAL at 08:15

## 2023-08-01 RX ADMIN — AMOXICILLIN AND CLAVULANATE POTASSIUM 1 TABLET: 875; 125 TABLET, FILM COATED ORAL at 08:15

## 2023-08-01 RX ADMIN — ENOXAPARIN SODIUM 30 MG: 100 INJECTION SUBCUTANEOUS at 08:16

## 2023-08-01 RX ADMIN — PREDNISONE 30 MG: 20 TABLET ORAL at 08:15

## 2023-08-01 RX ADMIN — FUROSEMIDE 20 MG: 20 TABLET ORAL at 11:28

## 2023-08-01 RX ADMIN — SENNOSIDES AND DOCUSATE SODIUM 2 TABLET: 50; 8.6 TABLET ORAL at 08:14

## 2023-08-01 RX ADMIN — FAMOTIDINE 20 MG: 20 TABLET ORAL at 08:14

## 2023-08-01 RX ADMIN — HYDROCODONE BITARTRATE AND ACETAMINOPHEN 1 TABLET: 10; 325 TABLET ORAL at 05:06

## 2023-08-01 RX ADMIN — Medication 1 TABLET: at 09:49

## 2023-08-01 NOTE — PLAN OF CARE
Goal Outcome Evaluation:  Plan of Care Reviewed With: patient, daughter        Progress: improving  Outcome Evaluation: patient alert and oriented. will discharge home this shift. wound care complete

## 2023-08-01 NOTE — THERAPY PROGRESS REPORT/RE-CERT
Patient Name: Sandra Sylvester  : 1945    MRN: [id number]                              Today's Date: 2023       Admit Date: 2023    Visit Dx:     ICD-10-CM ICD-9-CM   1. Pneumonia due to infectious organism, unspecified laterality, unspecified part of lung  J18.9 486   2. Acute respiratory failure with hypoxia  J96.01 518.81   3. Decreased activities of daily living (ADL)  Z78.9 V49.89   4. Difficulty walking  R26.2 719.7     Patient Active Problem List   Diagnosis    Closed fracture of right hip    Closed nondisplaced fracture of acetabulum    HTN (hypertension)    Malnourished    BMI less than 19,adult    Severe malnutrition    PNA (pneumonia)    COPD with acute exacerbation    Hypoxia    Pleural effusion    Hospital psychosis     Past Medical History:   Diagnosis Date    COPD (chronic obstructive pulmonary disease)     Hyperlipidemia     Hypertension      Past Surgical History:   Procedure Laterality Date    CHOLECYSTECTOMY        General Information       Row Name 23 1129          OT Time and Intention    Document Type progress note/recertification  -AV     Mode of Treatment individual therapy;occupational therapy  -AV       Row Name 23 1129          General Information    Patient Profile Reviewed yes  -AV     Prior Level of Function independent:;ADL's;all household mobility;transfer  Ambulates with STC.  No home oxygen.  -AV     Existing Precautions/Restrictions fall;oxygen therapy device and L/min  -AV     Barriers to Rehab none identified  -AV       Row Name 23 1129          Occupational Profile    Reason for Services/Referral (Occupational Profile) OT recertification as patient remains hospitalized and continues to present with limitations impacting ADL and transfer independence.  -AV       Row Name 23 1129          Cognition    Orientation Status (Cognition) --  Alert, pleasant and cooperative.  Able to retain information and follow commands  -AV       Row Name  08/01/23 1129          Safety Issues, Functional Mobility    Impairments Affecting Function (Mobility) balance;endurance/activity tolerance;strength  -AV               User Key  (r) = Recorded By, (t) = Taken By, (c) = Cosigned By      Initials Name Provider Type    Toñito Gant OT Occupational Therapist                     Mobility/ADL's       Row Name 08/01/23 1130          Transfers    Comment, (Transfers) SBA/RW  -AV       Row Name 08/01/23 1130          Activities of Daily Living    BADL Assessment/Intervention --  Independent feeding.  Min assist grooming (teeth).  CGA/min assist bathing and dressing.  CGA toileting hygiene (ambulates to bathroom).  -AV               User Key  (r) = Recorded By, (t) = Taken By, (c) = Cosigned By      Initials Name Provider Type    Toñito Gant OT Occupational Therapist                   Obj/Interventions       Row Name 08/01/23 1131          Sensory Assessment (Somatosensory)    Sensory Assessment (Somatosensory) UE sensation intact  -AV       Row Name 08/01/23 1131          Vision Assessment/Intervention    Visual Impairment/Limitations WFL;corrective lenses for reading  -AV       Row Name 08/01/23 1131          Range of Motion Comprehensive    General Range of Motion bilateral upper extremity ROM WFL  -AV     Comment, General Range of Motion AROM  -AV       Row Name 08/01/23 1131          Strength Comprehensive (MMT)    Comment, General Manual Muscle Testing (MMT) Assessment 4 -/5 bilateral upper extremities  -AV       Row Name 08/01/23 1131          Shoulder (Therapeutic Exercise)    Shoulder Strengthening (Therapeutic Exercise) bilateral;flexion;horizontal aBduction/aDduction;sitting;1 lb free weight;15 repititions  -AV       Row Name 08/01/23 1131          Elbow/Forearm (Therapeutic Exercise)    Elbow/Forearm Strengthening (Therapeutic Exercise) bilateral;flexion;extension;supination;pronation;sitting;1 lb free weight;15 repititions  -AV       Row Name  08/01/23 1131          Motor Skills    Coordination WFL  -AV     Functional Endurance Fair  -AV     Therapeutic Exercise --  Performed while seated in recliner.  Two rest breaks required.  -AV       Row Name 08/01/23 1131          Balance    Comment, Balance SBA/RW  -AV               User Key  (r) = Recorded By, (t) = Taken By, (c) = Cosigned By      Initials Name Provider Type    AV Toñito Cardenas OT Occupational Therapist                   Goals/Plan       Row Name 08/01/23 1134          Transfer Goal 1 (OT)    Activity/Assistive Device (Transfer Goal 1, OT) transfers, all  -AV     [place] Level/Cues Needed (Transfer Goal 1, OT) modified independence  -AV     Time Frame (Transfer Goal 1, OT) long term goal (LTG);10 days  -AV     Progress/Outcome (Transfer Goal 1, OT) good progress toward goal;goal ongoing  -AV       Row Name 08/01/23 1134          Bathing Goal 1 (OT)    Activity/Device (Bathing Goal 1, OT) bathing skills, all;tub bench  -AV     [place] Level/Cues Needed (Bathing Goal 1, OT) modified independence  -AV     Time Frame (Bathing Goal 1, OT) long term goal (LTG);10 days  -AV     Progress/Outcomes (Bathing Goal 1, OT) goal ongoing  -AV       Row Name 08/01/23 1134          Dressing Goal 1 (OT)    Activity/Device (Dressing Goal 1, OT) dressing skills, all  -AV     [place]/Cues Needed (Dressing Goal 1, OT) modified independence  -AV     Time Frame (Dressing Goal 1, OT) long term goal (LTG);10 days  -AV     Progress/Outcome (Dressing Goal 1, OT) goal ongoing  -AV       Row Name 08/01/23 1134          Toileting Goal 1 (OT)    Activity/Device (Toileting Goal 1, OT) toileting skills, all;raised toilet seat  -AV     [place] Level/Cues Needed (Toileting Goal 1, OT) modified independence  -AV     Time Frame (Toileting Goal 1, OT) long term goal (LTG);10 days  -AV     Progress/Outcome (Toileting Goal 1, OT) goal ongoing  -AV       Row Name 08/01/23 1134          Grooming Goal 1 (OT)     Activity/Device (Grooming Goal 1, OT) grooming skills, all  -AV     [place] (Grooming Goal 1, OT) modified independence  -AV     Time Frame (Grooming Goal 1, OT) long term goal (LTG);10 days  -AV     Progress/Outcome (Grooming Goal 1, OT) goal ongoing  -AV       Row Name 08/01/23 1134          Strength Goal 1 (OT)    Strength Goal 1 (OT) Patient will demonstrate 4/5 bilateral upper extremities to increase ADL/transfer independence.  -AV     Time Frame (Strength Goal 1, OT) long term goal (LTG);10 days  -AV     Progress/Outcome (Strength Goal 1, OT) goal ongoing  -AV       Row Name 08/01/23 1134          Problem Specific Goal 1 (OT)    Problem Specific Goal 1 (OT) Patient will demonstrate fair endurance/activity tolerance needed to support ADLs.  -AV     Time Frame (Problem Specific Goal 1, OT) long term goal (LTG);10 days  -AV       Row Name 08/01/23 1134          Therapy Assessment/Plan (OT)    Planned Therapy Interventions (OT) activity tolerance training;BADL retraining;functional balance retraining;occupation/activity based interventions;patient/caregiver education/training;strengthening exercise;transfer/mobility retraining  -AV               User Key  (r) = Recorded By, (t) = Taken By, (c) = Cosigned By      Initials Name Provider Type    Toñito Gant OT Occupational Therapist                   Clinical Impression       Row Name 08/01/23 1133          Pain Scale: FACES Pre/Post-Treatment    Pain: FACES Scale, Pretreatment 0-->no hurt  -AV     Posttreatment Pain Rating 0-->no hurt  -AV       Row Name 08/01/23 1131          Plan of Care Review    Plan of Care Reviewed With patient  -AV     Progress improving  -AV     Outcome Evaluation Patient continues to present with limitations of strength, balance and endurance/activity tolerance which are impacting ADL and transfer independence.  Continued OT services are indicated to remediate/compensate for deficits to maximize independence.  -AV       Row Name  08/01/23 1133          Therapy Assessment/Plan (OT)    Patient/Family Therapy Goal Statement (OT) Regain independence  -AV     Rehab Potential (OT) good, to achieve stated therapy goals  -AV     Criteria for Skilled Therapeutic Interventions Met (OT) yes;meets criteria;skilled treatment is necessary  -AV     Therapy Frequency (OT) 5 times/wk  -AV       Row Name 08/01/23 1133          Therapy Plan Review/Discharge Plan (OT)    Equipment Needs Upon Discharge (OT) commode chair  -AV     Anticipated Discharge Disposition (OT) skilled nursing facility  -AV       Row Name 08/01/23 1133          Vital Signs    O2 Delivery Pre Treatment nasal cannula  1  -AV     O2 Delivery Intra Treatment nasal cannula  1  -AV     O2 Delivery Post Treatment nasal cannula  1  -AV               User Key  (r) = Recorded By, (t) = Taken By, (c) = Cosigned By      Initials Name Provider Type    Toñito Gant, PURA Occupational Therapist                   Outcome Measures       Row Name 08/01/23 1134          How much help from another is currently needed...    Putting on and taking off regular lower body clothing? 3  -AV     Bathing (including washing, rinsing, and drying) 3  -AV     Toileting (which includes using toilet bed pan or urinal) 3  -AV     Putting on and taking off regular upper body clothing 3  -AV     Taking care of personal grooming (such as brushing teeth) 3  -AV     Eating meals 4  -AV     AM-PAC 6 Clicks Score (OT) 19  -AV       Row Name 08/01/23 0825          How much help from another person do you currently need...    Turning from your back to your side while in flat bed without using bedrails? 4  -AH     Moving from lying on back to sitting on the side of a flat bed without bedrails? 4  -AH     Moving to and from a bed to a chair (including a wheelchair)? 4  -AH     Standing up from a chair using your arms (e.g., wheelchair, bedside chair)? 4  -AH     Climbing 3-5 steps with a railing? 3  -AH     To walk in hospital  room? 4  -     AM-PAC 6 Clicks Score (PT) 23  -     Highest level of mobility 7 --> Walked 25 feet or more  -       Row Name 08/01/23 1134          Optimal Instrument    Bending/Stooping 2  -     Standing 2  -     Reaching 1  -               User Key  (r) = Recorded By, (t) = Taken By, (c) = Cosigned By      Initials Name Provider Type     Aleyda Newman, RN Registered Nurse    Toñito Gant OT Occupational Therapist                    Occupational Therapy Education       Title: PT OT SLP Therapies (In Progress)       Topic: Occupational Therapy (In Progress)       Point: ADL training (Done)       Description:   Instruct learner(s) on proper safety adaptation and remediation techniques during self care or transfers.   Instruct in proper use of assistive devices.                  Learning Progress Summary             Patient Acceptance, E, VU by AV at 8/1/2023 1135    Acceptance, E, VU by AV at 7/21/2023 1032                         Point: Home exercise program (Not Started)       Description:   Instruct learner(s) on appropriate technique for monitoring, assisting and/or progressing therapeutic exercises/activities.                  Learner Progress:  Not documented in this visit.              Point: Precautions (Not Started)       Description:   Instruct learner(s) on prescribed precautions during self-care and functional transfers.                  Learner Progress:  Not documented in this visit.              Point: Body mechanics (Not Started)       Description:   Instruct learner(s) on proper positioning and spine alignment during self-care, functional mobility activities and/or exercises.                  Learner Progress:  Not documented in this visit.                              User Key       Initials Effective Dates Name Provider Type Discipline     06/16/21 -  Toñito Cardenas OT Occupational Therapist OT                  OT Recommendation and Plan  Planned Therapy Interventions (OT):  activity tolerance training, BADL retraining, functional balance retraining, occupation/activity based interventions, patient/caregiver education/training, strengthening exercise, transfer/mobility retraining  Therapy Frequency (OT): 5 times/wk  Plan of Care Review  Plan of Care Reviewed With: patient  Progress: improving  Outcome Evaluation: Patient continues to present with limitations of strength, balance and endurance/activity tolerance which are impacting ADL and transfer independence.  Continued OT services are indicated to remediate/compensate for deficits to maximize independence.     Time Calculation:   Evaluation Complexity (OT)  Review Occupational Profile/Medical/Therapy History Complexity: expanded/moderate complexity  Assessment, Occupational Performance/Identification of Deficit Complexity: 3-5 performance deficits  Clinical Decision Making Complexity (OT): detailed assessment/moderate complexity  Overall Complexity of Evaluation (OT): moderate complexity     Time Calculation- OT       Row Name 08/01/23 1135             Time Calculation- OT    OT Received On 08/01/23  -AV      OT Goal Re-Cert Due Date 08/10/23  -AV         Timed Charges    78173 - OT Therapeutic Exercise Minutes 25  -AV         Total Minutes    Timed Charges Total Minutes 25  -AV       Total Minutes 25  -AV                User Key  (r) = Recorded By, (t) = Taken By, (c) = Cosigned By      Initials Name Provider Type    AV Toñito Cardenas OT Occupational Therapist                  Therapy Charges for Today       Code Description Service Date Service Provider Modifiers Qty    33208775616 HC OT THER PROC EA 15 MIN 8/1/2023 Toñito Cardenas OT GO 2                 Toñito Cardenas OT  8/1/2023

## 2023-08-01 NOTE — SIGNIFICANT NOTE
Exercise Oximetry    Patient Name:Sandra Sylvester   MRN: [id number]   Date: 08/01/23             ROOM AIR BASELINE   SpO2% 97   Heart Rate    Blood Pressure      EXERCISE ON ROOM AIR SpO2% EXERCISE ON O2 @ 2LPM SpO2%   1 MINUTE 95 1 MINUTE    2 MINUTES 93 2 MINUTES    3 MINUTES 90 3 MINUTES    4 MINUTES 87 4 MINUTES    5 MINUTES  5 MINUTES 91   6 MINUTES  6 MINUTES 92              Distance Walked   Distance Walked   Dyspnea (Zachery Scale)   Dyspnea (Zachery Scale)   Fatigue (Zachery Scale)  Fatigue (Zachery Scale)   SpO2% Post Exercise   SpO2% Post Exercise   HR Post Exercise   HR Post Exercise   Time to Recovery   Time to Recovery     Comments: failed

## 2023-08-01 NOTE — PLAN OF CARE
Goal Outcome Evaluation:  Plan of Care Reviewed With: patient        Progress: improving  Outcome Evaluation: Patient continues to present with limitations of strength, balance and endurance/activity tolerance which are impacting ADL and transfer independence.  Continued OT services are indicated to remediate/compensate for deficits to maximize independence.      Anticipated Discharge Disposition (OT): skilled nursing facility

## 2023-08-01 NOTE — DISCHARGE SUMMARY
[facility]         DISCHARGE SUMMARY    Patient Name: Sandra Sylvester  : 1945  MRN: [id number]    Date of Admission: 2023  Date of Discharge: 2023  Primary Care Physician: René Garrett MD    Consults       Date and Time Order Name Status Description    2023  3:21 PM Inpatient Pulmonology Consult      2023 10:24 PM IP General Consult (Use specialty-specific consult if known)              Presenting Problem:   Acute respiratory failure with hypoxia [J96.01]  PNA (pneumonia) [J18.9]  Pneumonia due to infectious organism, unspecified laterality, unspecified part of lung [J18.9]    Active and Resolved Hospital Problems:  Active Hospital Problems    Diagnosis POA    Hypoxia [R09.02] Yes    Severe malnutrition [E43] Yes      Resolved Hospital Problems    Diagnosis POA    Hospital psychosis [F23] No    Pleural effusion [J90] Yes    COPD with acute exacerbation [J44.1] Yes    PNA (pneumonia) [J18.9] Yes         Hospital Course     Hospital Course:  Sandra Sylvester is a 78 y.o. female admitted to hospital for pneumonia and pleural effusion.  Patient has a large pneumonia and effusion on the left side.  She was treated with antibiotics further pulmonologist Dr. Gallo was consulted, thoracentesis was performed.  Patient had improvement in her aeration.  Chest tube was left.    She also had a little bit of hospital acquired confusion/hospital psychosis.  Treated with Seroquel.    Patient remained extremely weak and has chronic back pain, narcotics was used to control pain and she felt better with narcotics.    Patient is extremely weak, cachectic and malnourished.  Recommended increase p.o. intake.  Patient was also recommended to go to inpatient rehab at rehab facility/nursing home.  Patient refused, family aware.    Her chest tube is out 3 days now she is feeling better chest x-ray did not show any reaccumulation of fluid.  As she is doing better she will be  discharged to home on oral antibiotics and steroids        DISCHARGE Follow Up Recommendations for labs and diagnostics:   Discharge to home with outpatient follow-up      Day of Discharge     Vital Signs:  Temp:  [97.2 øF (36.2 øC)-98.6 øF (37 øC)] 97.2 øF (36.2 øC)  Heart Rate:  [77-82] 79  Resp:  [18] 18  BP: (107-117)/(53-72) 114/59  Flow (L/min):  [1] 1    Physical Exam:    Elderly cachectic female not in acute distress.  Heart regular.  Lungs diminished breath sounds, severe kyphoscoliosis.  Extremities no edema, neuro awake alert and oriented.      Pertinent  and/or Most Recent Results     LAB RESULTS:      Lab 07/26/23  0858   WBC 14.50*   HEMOGLOBIN 13.7   HEMATOCRIT 43.6   PLATELETS 254   NEUTROS ABS 13.38*   IMMATURE GRANS (ABS) 0.12*   LYMPHS ABS 0.29*   MONOS ABS 0.66   EOS ABS 0.02   MCV 98.6*   PROCALCITONIN 0.03         Lab 07/26/23  0858   SODIUM 130*   POTASSIUM 4.7   CHLORIDE 95*   CO2 23.6   ANION GAP 11.4   BUN 19   CREATININE 0.46*   EGFR 96.9   GLUCOSE 125*   CALCIUM 8.9   MAGNESIUM 2.4   PHOSPHORUS 3.7         Lab 07/26/23  0858   ALBUMIN 3.2*                     Brief Urine Lab Results  (Last result in the past 365 days)        Color   Clarity   Blood   Leuk Est   Nitrite   Protein   CREAT   Urine HCG        01/09/23 2025 Orange   Cloudy   --  Comment: Result not available due to interfering substances.   --  Comment: Result not available due to interfering substances.   --  Comment: Result not available due to interfering substances.   --  Comment: Result not available due to interfering substances.                 Microbiology Results (last 10 days)       Procedure Component Value - Date/Time    AFB Culture - Body Fluid, Pleural Cavity [[id number]] Collected: 07/25/23 1551    Lab Status: Preliminary result Specimen: Body Fluid from Pleural Cavity Updated: 08/01/23 1615     AFB Culture No AFB isolated at 1 week     AFB Stain No acid fast bacilli seen    Body Fluid Culture - Body Fluid,  Pleural Cavity [[id number]] Collected: 07/25/23 1551    Lab Status: Final result Specimen: Body Fluid from Pleural Cavity Updated: 07/28/23 0722     Body Fluid Culture No growth at 3 days     Gram Stain No organisms seen      Rare (1+) WBCs seen    Anaerobic Culture - Pleural Fluid, Pleural Cavity [[id number]]  (Normal) Collected: 07/25/23 1551    Lab Status: Final result Specimen: Pleural Fluid from Pleural Cavity Updated: 07/30/23 0659     Anaerobic Culture No anaerobes isolated at 5 days    Fungus Culture - Body Fluid, Pleural Cavity [[id number]] Collected: 07/25/23 1551    Lab Status: Preliminary result Specimen: Body Fluid from Pleural Cavity Updated: 08/01/23 1615     Fungus Culture No fungus isolated at 1 week            PROCEDURES:    CT Chest Without Contrast Diagnostic    Result Date: 7/27/2023  Impression:   1. Right-sided chest tube within the anterior basilar aspect of the pleural space.  Trace residual right-sided pleural effusion is present within the posterior right pleural space.  2. Small left-sided pleural effusion with associated left basilar compressive atelectasis.  3. Moderate centrilobular emphysema.  Central bronchial wall thickening which may indicate chronic bronchitis. 4. Chronic compression fractures of the lower thoracic and upper lumbar spine. 5. Circumscribed low-density mass within the right axillary region, favored to represent a benign skin lesions such as an epidermal inclusion cyst or sebaceous cyst.  Correlate clinically with exam.        CHARISSE HERNANDEZ MD       Electronically Signed and Approved By: CHARISSE HERNANDEZ MD on 7/27/2023 at 19:57             CT Chest Without Contrast Diagnostic    Result Date: 7/22/2023  Impression:   1. There is a very large right-sided pleural effusion, which is probably loculated.  There is associated deformity of the right lung, which may represent atelectasis.  Pneumonia cannot be excluded.  2. A small-to-moderate-sized probably loculated left  pleural effusion is seen also.  3. Extensive atherosclerotic changes are noted.  4. There may be anasarca.  5. No cardiac enlargement.  6. There may be prominent reactive mediastinal lymph nodes.  7. There is a nearly 3 cm cystic mass involving the right axilla which may represent some type of benign cyst, such as a seroma.  Suppurative or necrotic adenopathy cannot be excluded but is (are) probably less likely.  8. There are several vertebral compression fractures, which are age-indeterminate but thought most likely to be chronic in nature, including, but not necessarily limited to, C6, T4, T5, T10, and L1.  9. Please see above comments for further detail   1.   Please note that portions of this note were completed with a voice recognition program.  ADIS GRAVES JR, MD       Electronically Signed and Approved By: ADIS GRAVES JR, MD on 7/22/2023 at 0:32              XR Chest 1 View    Result Date: 7/31/2023  Impression:   1. Stable to slight increase in pleural parenchymal changes at the left base 2. Increased focal consolidation right lung base with associated small pleural effusion. 3. No definite pneumothorax identified of on slightly limited imaging.       LUANNE BONILLA MD       Electronically Signed and Approved By: LUANNE BONILLA MD on 7/31/2023 at 8:26             XR Chest 1 View    Result Date: 7/26/2023  Impression:   1. Right-sided chest tube in place with a trace amount of pleural fluid suspected at the right base.  No definite pneumothorax is identified at this time 2. Small to moderate left-sided pleural effusion 3. Suspected mild interstitial edema       LUANNE BONILLA MD       Electronically Signed and Approved By: LUANNE BONILLA MD on 7/26/2023 at 14:51             XR Chest 1 View    Result Date: 7/25/2023  Impression:   1. At least a small right hydropneumothorax with thoracostomy tube in place. 2. Findings of probable mild interstitial edema, small left effusion and bibasilar  opacities favoring atelectasis.       ANTHONY BALLARD MD       Electronically Signed and Approved By: ANTHONY BALLARD MD on 7/25/2023 at 16:05             XR Chest 1 View    Result Date: 7/19/2023  Impression:   7 cm consolidation at the right lung base probably represents pneumonia or aspiration.       EDUIN VALDEZ MD       Electronically Signed and Approved By: EDUIN VALDEZ MD on 7/19/2023 at 19:37                          Labs Pending at Discharge:  Pending Labs       Order Current Status    AFB Culture - Body Fluid, Pleural Cavity Preliminary result    Fungus Culture - Body Fluid, Pleural Cavity Preliminary result              Discharge Details        Discharge Medications        New Medications        Instructions Start Date   amoxicillin-clavulanate 875-125 MG per tablet  Commonly known as: AUGMENTIN   1 tablet, Oral, Every 12 Hours Scheduled      famotidine 20 MG tablet  Commonly known as: PEPCID   20 mg, Oral, Daily   Start Date: August 2, 2023     furosemide 20 MG tablet  Commonly known as: LASIX   20 mg, Oral, Daily   Start Date: August 2, 2023     HYDROcodone-acetaminophen 5-325 MG per tablet  Commonly known as: Norco   1 tablet, Oral, Every 8 Hours PRN      ipratropium-albuterol 0.5-2.5 mg/3 ml nebulizer  Commonly known as: DUO-NEB   3 mL, Nebulization, Every 6 Hours PRN      predniSONE 10 MG tablet  Commonly known as: DELTASONE   Take 2 tablets by mouth Daily With Breakfast for 3 days, THEN 1 tablet Daily With Breakfast for 3 days, THEN 0.5 tablets Daily With Breakfast for 3 days.   Start Date: August 3, 2023            Changes to Medications        Instructions Start Date   metoprolol succinate XL 25 MG 24 hr tablet  Commonly known as: TOPROL-XL  What changed: when to take this   25 mg, Oral, Daily             Continue These Medications        Instructions Start Date   albuterol sulfate  (90 Base) MCG/ACT inhaler  Commonly known as: PROVENTIL HFA;VENTOLIN HFA;PROAIR HFA   2 puffs,  Inhalation, Every 4 Hours PRN      atorvastatin 20 MG tablet  Commonly known as: LIPITOR   20 mg, Oral, Daily      clobetasol 0.05 % ointment  Commonly known as: TEMOVATE   1 application , Topical, 2 Times Daily PRN      clobetasol 0.05 % external solution  Commonly known as: TEMOVATE   1 application , Topical, 2 Times Daily PRN      Multi For Her 50+ tablet tablet   1 tablet, Oral, Daily      naproxen 500 MG tablet  Commonly known as: NAPROSYN   500 mg, Oral, 2 Times Daily PRN      Oyster Shell Calcium/D 500-10 MG-MCG tablet tablet   1 tablet, Oral, Daily      traZODone 50 MG tablet  Commonly known as: DESYREL   50 mg, Oral, Nightly PRN, Very prn               Allergies   Allergen Reactions    Buspar [Buspirone] Delirium         Discharge Disposition:    Home or Self Care    Diet:  Regular with milkshakes and supplements      Discharge Activity:     Activity Instructions       Activity as Tolerated              No future appointments.    Additional Instructions for the Follow-ups that You Need to Schedule       Discharge Follow-up with PCP   As directed       Currently Documented PCP:    René Garrett MD    PCP Phone Number:    [phone]     Follow Up Details: in 1 week       Additional information on Labs and Follow-ups:    Follow up Dr Garrett 8-7 @12:00           Time spent on Discharge including face to face service: 33 minutes.            I have dictated this note utilizing Dragon Dictation.             Please note that portions of this note were completed with a voice recognition program.             Part of this note may be an electronic transcription/translation of spoken language to printed text         using the Dragon Dictation System.       Electronically signed by René Garrett MD, 08/01/23, 5:10 PM EDT.

## 2023-08-01 NOTE — THERAPY EVALUATION
Acute Care - Physical Therapy Re-Evaluation   [facility]     Patient Name: Sandra Sylvester  : 1945  MRN: [id number]  Today's Date: 2023      Visit Dx:     ICD-10-CM ICD-9-CM   1. Pneumonia due to infectious organism, unspecified laterality, unspecified part of lung  J18.9 486   2. Acute respiratory failure with hypoxia  J96.01 518.81   3. Decreased activities of daily living (ADL)  Z78.9 V49.89   4. Difficulty walking  R26.2 719.7     Patient Active Problem List   Diagnosis    Closed fracture of right hip    Closed nondisplaced fracture of acetabulum    HTN (hypertension)    Malnourished    BMI less than 19,adult    Severe malnutrition    Hypoxia     Past Medical History:   Diagnosis Date    COPD (chronic obstructive pulmonary disease)     Hyperlipidemia     Hypertension      Past Surgical History:   Procedure Laterality Date    CHOLECYSTECTOMY       PT Assessment (last 12 hours)       PT Evaluation and Treatment       Row Name 23 1500          Physical Therapy Time and Intention    Subjective Information complains of;pain;fatigue (P)   -MB     Document Type re-evaluation (P)   -MB     Mode of Treatment individual therapy;physical therapy (P)   -MB     Patient Effort good (P)   -MB     Symptoms Noted During/After Treatment fatigue;increased pain (P)   -MB       Row Name 23 1500          Bed Mobility    Bed Mobility other (see comments) (P)   Pt UIC  -MB       Row Name 23 1500          Transfers    Transfers sit-stand transfer;stand-sit transfer (P)   -MB       Row Name 23 1500          Sit-Stand Transfer    Sit-Stand [place] (Transfers) standby assist (P)   -MB     Assistive Device (Sit-Stand Transfers) walker, front-wheeled (P)   -MB       Row Name 23 1500          Stand-Sit Transfer    Stand-Sit [place] (Transfers) standby assist (P)   -MB     Assistive Device (Stand-Sit Transfers) walker, front-wheeled (P)   -MB       Row Name 23 1500          Gait/Stairs  (Locomotion)    Gait/Stairs Locomotion gait/ambulation independence;gait/ambulation assistive device;distance ambulated (P)   -MB     [place] Level (Gait) supervision (P)   -MB     Assistive Device (Gait) walker, front-wheeled (P)   -MB     Distance in Feet (Gait) 20 (P)   Pt ambulated 20 feet twice  -MB       Row Name 08/01/23 1500          Safety Issues, Functional Mobility    Impairments Affecting Function (Mobility) balance;endurance/activity tolerance;strength (P)   -MB       Row Name 08/01/23 1500          Balance    Balance Assessment standing dynamic balance (P)   -MB     Dynamic Sitting Balance supervision (P)   -MB       Row Name             Wound 07/20/23 0300 Right lower arm Skin Tear    Wound - Properties Group Placement Date: 07/20/23  -VS Placement Time: 0300  -VS Present on Hospital Admission: Y  -VS Side: Right  -VS Orientation: lower  -VS Location: arm  -VS Primary Wound Type: Skin tear  -VS    Retired Wound - Properties Group Placement Date: 07/20/23  -VS Placement Time: 0300  -VS Present on Hospital Admission: Y  -VS Side: Right  -VS Orientation: lower  -VS Location: arm  -VS Primary Wound Type: Skin tear  -VS    Retired Wound - Properties Group Date first assessed: 07/20/23  -VS Time first assessed: 0300  -VS Present on Hospital Admission: Y  -VS Side: Right  -VS Location: arm  -VS Primary Wound Type: Skin tear  -VS      Row Name             Wound 07/22/23 0645 Left lower arm    Wound - Properties Group Placement Date: 07/22/23  -DK Placement Time: 0645  -DK Side: Left  -DK Orientation: lower  -DK Location: arm  -DK    Retired Wound - Properties Group Placement Date: 07/22/23  -DK Placement Time: 0645  -DK Side: Left  -DK Orientation: lower  -DK Location: arm  -DK    Retired Wound - Properties Group Date first assessed: 07/22/23  -DK Time first assessed: 0645  -DK Side: Left  -DK Location: arm  -DK      Row Name             Wound Right posterior back Incision    Wound - Properties Group  Side: Right  -HL Orientation: posterior  -HL Location: back  -HL Primary Wound Type: Incision  -HL Additional Comments: Area from chest tube.  -HL    Retired Wound - Properties Group Side: Right  -HL Orientation: posterior  -HL Location: back  -HL Primary Wound Type: Incision  -HL Additional Comments: Area from chest tube.  -HL    Retired Wound - Properties Group Side: Right  -HL Location: back  -HL Primary Wound Type: Incision  -HL Additional Comments: Area from chest tube.  -HL      Row Name 08/01/23 1500          Plan of Care Review    Plan of Care Reviewed With patient;daughter (P)   -MB     Progress improving (P)   -MB       Row Name 08/01/23 1500          Positioning and Restraints    Pre-Treatment Position sitting in chair/recliner (P)   -MB     Post Treatment Position chair (P)   -MB     In Bed sitting (P)   -MB       Row Name 08/01/23 1500          Progress Summary (PT)    Progress Toward Functional Goals (PT) progress toward functional goals is good (P)   -MB     Daily Progress Summary (PT) PT tolerated treatment well today. She is continuing to make progress toward meeting her goals and will benefit from continued skilled PT intervention. (P)   -MB       Row Name 08/01/23 1500          Bed Mobility Goal 1 (PT)    Activity/Assistive Device (Bed Mobility Goal 1, PT) bed mobility activities, all (P)   -MB     [place] Level/Cues Needed (Bed Mobility Goal 1, PT) modified independence (P)   -MB     Time Frame (Bed Mobility Goal 1, PT) long term goal (LTG);10 days (P)   -MB     Progress/Outcomes (Bed Mobility Goal 1, PT) continuing progress toward goal (P)   -MB       Row Name 08/01/23 1500          Transfer Goal 1 (PT)    Activity/Assistive Device (Transfer Goal 1, PT) sit-to-stand/stand-to-sit;bed-to-chair/chair-to-bed;walker, rolling (P)   -MB     [place] Level/Cues Needed (Transfer Goal 1, PT) supervision required (P)   -MB     Time Frame (Transfer Goal 1, PT) long term goal (LTG);10 days (P)   -MB      Progress/Outcome (Transfer Goal 1, PT) continuing progress toward goal (P)   -MB       Row Name 08/01/23 1500          Gait Training Goal 1 (PT)    Activity/Assistive Device (Gait Training Goal 1, PT) gait (walking locomotion);assistive device use;walker, rolling (P)   -MB     [place] Level (Gait Training Goal 1, PT) standby assist (P)   -MB     Distance (Gait Training Goal 1, PT) 150 (P)   -MB     Time Frame (Gait Training Goal 1, PT) long term goal (LTG);10 days (P)   -MB     Progress/Outcome (Gait Training Goal 1, PT) continuing progress toward goal (P)   -MB               User Key  (r) = Recorded By, (t) = Taken By, (c) = Cosigned By      Initials Name Provider Type     Mishel Pimentel, RN Registered Nurse    Sage Walls, RN Registered Nurse    Radha Fang RN Registered Nurse    Philipp Mcdonald, PT Student PT Student                    Physical Therapy Education       Title: PT OT SLP Therapies (In Progress)       Topic: Physical Therapy (In Progress)       Point: Mobility training (In Progress)       Learning Progress Summary             Patient Acceptance, E, NR by  at 7/21/2023 1542                         Point: Home exercise program (Not Started)       Learner Progress:  Not documented in this visit.              Point: Body mechanics (Not Started)       Learner Progress:  Not documented in this visit.              Point: Precautions (In Progress)       Learning Progress Summary             Patient Acceptance, E, NR by  at 7/21/2023 1542                                         User Key       Initials Effective Dates Name Provider Type Discipline     04/25/21 -  Rianna Lopez, PT Physical Therapist PT                  PT Recommendation and Plan     Progress Summary (PT)  Progress Toward Functional Goals (PT): (P) progress toward functional goals is good  Daily Progress Summary (PT): (P) PT tolerated treatment well today. She is continuing to make progress toward meeting her  goals and will benefit from continued skilled PT intervention.  Plan of Care Reviewed With: (P) patient, daughter  Progress: (P) improving   Outcome Measures       Row Name 07/31/23 1000             How much help from another person do you currently need...    Turning from your back to your side while in flat bed without using bedrails? 4  -MAGNO (r) MB (t) MAGNO (c)      Moving from lying on back to sitting on the side of a flat bed without bedrails? 4  -MAGNO (r) MB (t) MAGNO (c)      Moving to and from a bed to a chair (including a wheelchair)? 4  -MAGNO (r) MB (t) MAGNO (c)      Standing up from a chair using your arms (e.g., wheelchair, bedside chair)? 4  -MAGNO (r) MB (t) MAGNO (c)      Climbing 3-5 steps with a railing? 3  -MAGNO (r) MB (t) MAGNO (c)      To walk in hospital room? 3  -MAGNO (r) MB (t) MAGNO (c)      AM-PAC 6 Clicks Score (PT) 22  -MAGNO (r) MB (t)                User Key  (r) = Recorded By, (t) = Taken By, (c) = Cosigned By      Initials Name Provider Type    Lamont Pearson, PT Physical Therapist    Philipp Mcdonald, PT Student PT Student                     Time Calculation:    PT Charges       Row Name 08/01/23 1522             Time Calculation    PT Received On 08/01/23 (P)   -MB      PT Goal Re-Cert Due Date 08/10/23 (P)   -MB         Timed Charges    42383 - Gait Training Minutes  15 (P)   -MB         Untimed Charges    PT Eval/Re-eval Minutes 23 (P)   -MB         Total Minutes    Timed Charges Total Minutes 15 (P)   -MB      Untimed Charges Total Minutes 23 (P)   -MB       Total Minutes 38 (P)   -MB                User Key  (r) = Recorded By, (t) = Taken By, (c) = Cosigned By      Initials Name Provider Type    Philipp Mcdonald, PT Student PT Student                  Therapy Charges for Today       Code Description Service Date Service Provider Modifiers Qty    30645259102 HC GAIT TRAINING EA 15 MIN 7/31/2023 Philipp Hubbard, PT Student GP 1    [id number] HC GAIT TRAINING EA 15 MIN 8/1/2023 Philipp Hubbard, PT Student GP 1     [id number]  PT RE-EVAL ESTABLISHED PLAN 2 8/1/2023 Philipp Hubbard, PT Student GP 1            PT G-Codes  Outcome Measure Options: AM-PAC 6 Clicks Daily Activity (OT), Optimal Instrument  AM-PAC 6 Clicks Score (PT): 23  AM-PAC 6 Clicks Score (OT): 19    Philipp Hubbard, PT Student  8/1/2023

## 2023-08-01 NOTE — NURSING NOTE
Attempted to see patient today for wound follow-up. Patient is currently pending discharge. Primary RN just completed wound care and also took discharge photos.   Fadumo Salgado RN

## 2023-08-08 ENCOUNTER — READMISSION MANAGEMENT (OUTPATIENT)
Dept: CALL CENTER | Facility: HOSPITAL | Age: 78
End: 2023-08-08
Payer: MEDICARE

## 2023-08-08 NOTE — OUTREACH NOTE
COPD/PN Week 1 Survey      Flowsheet Row Responses   [facility] facility patient discharged from? [facility]   Does the patient have one of the following disease processes/diagnoses(primary or secondary)? Pneumonia   Week 1 attempt successful? No   Unsuccessful attempts Attempt 1            Marcela GARCÍA - Registered Nurse

## 2023-08-10 ENCOUNTER — READMISSION MANAGEMENT (OUTPATIENT)
Dept: CALL CENTER | Facility: HOSPITAL | Age: 78
End: 2023-08-10
Payer: MEDICARE

## 2023-08-10 NOTE — OUTREACH NOTE
COPD/PN Week 2 Survey      Flowsheet Row Responses   [facility] facility patient discharged from? [facility]   Does the patient have one of the following disease processes/diagnoses(primary or secondary)? Pneumonia            Marcela T - Registered Nurse

## 2023-08-14 NOTE — OUTREACH NOTE
COPD/PN Week 1 Survey      Flowsheet Row Responses   [facility] patient discharged from? [facility]   Does the patient have one of the following disease processes/diagnoses(primary or secondary)? Pneumonia   Week 1 attempt successful? Yes   Call start time 1522   Call end time 1543   Discharge diagnosis pneumonia, pleural effusion, COPD with acute exacerbation   Person spoke with today (if not patient) and relationship Dtr- Melissa   Medication alerts for this patient per dtr, the pt is not taking the lasix now since 8/9/23   Meds reviewed with patient/caregiver? Yes   Is the patient having any side effects they believe may be caused by any medication additions or changes? Yes   Side effects comments  low 70/40- MD stopped Metoprolol, per dtr   Does the patient have all medications ordered at discharge? Yes   Is the patient taking all medications as directed (includes completed medication regime)? Yes   Medication comments Pt is on oral Flagyl but is having no saliva production so she is having difficulty with dry mouth, per dtr, it is taking over 2hrs to dissolve the med.   Does the patient have a primary care provider?  Yes   Does the patient have an appointment with their PCP or specialist within 7 days of discharge? Yes   Comments regarding PCP PCP f/u 8/21/23   Has the patient kept scheduled appointments due by today? N/A   What is the Home health agency?  [facility]   DME comments wearing home O2   Pulse Ox monitoring Intermittent   Pulse Ox device source Patient   O2 Sat comments RA- 93-99% when walking sometimes drops to 80's   O2 Sat: education provided Sat levels, Monitoring frequency, When to seek care   Psychosocial issues? No   Comments Pt's dtr reports pt is improving r/t her SOA/cough, productive sometimes, phlegm is yellow in color. Denies fever. Per dtr the BP has been low, 70/40, pt lethargic, not eating well, extreme dry mouth despite drinking water throughout day per dtr. Advised to call MD  today r/t low BP or return to ER, her BP today 78/45 per dtr.HH was contacting MD per dtr, but not heard back from them.Dtr has attempted to reach PCP, waiting on response, she reports.   Did the patient receive a copy of their discharge instructions? Yes   Nursing interventions Reviewed instructions with patient   What is the patient's perception of their health status since discharge? Worsening   Nursing Interventions Nurse provided patient education, Advised patient to call provider   Is the patient/caregiver able to teach back the hierarchy of who to call/visit for symptoms/problems? PCP, Specialist, Home health nurse, Urgent Care, ED, 911 Yes   Is the patient/caregiver able to teach back signs and symptoms of worsening condition: Fever/chills, Chest pain, Shortness of breath   Is the patient/caregiver able to teach back importance of completing antibiotic course of treatment? Yes   Week 1 call completed? Yes   Is the patient interested in additional calls from an ambulatory ? No   Call end time 1524            Padmini DE LA ROSA - Registered Nurse

## 2023-08-15 PROBLEM — J90 PLEURAL EFFUSION: Status: ACTIVE | Noted: 2023-01-01

## 2023-08-15 LAB
FUNGUS WND CULT: NORMAL
MYCOBACTERIUM SPEC CULT: NORMAL
NIGHT BLUE STAIN TISS: NORMAL

## 2023-08-15 NOTE — PLAN OF CARE
Goal Outcome Evaluation:  Plan of Care Reviewed With: patient        Progress: no change  Outcome Evaluation: Pt vss. 2L nc  sats 97%. Admitted from the ER. Continue plan of care

## 2023-08-15 NOTE — H&P
[facility]   HISTORY AND PHYSICAL    Patient Name: Sandra Sylvester  : 1945  MRN: [id number]  Primary Care Physician:  René Garrett MD  Date of admission: 8/15/2023    Subjective   Subjective     Chief Complaint:   Shortness of breath and weakness      HPI:    Sandra Sylvester is a 78 y.o. female with severe malnutrition and advanced medical issues with end-stage COPD, who was recently admitted to hospital for pneumonia and pleural effusion, status post pleural tap couple of times with a chest tube.  Patient was discharged almost 10 days ago.  And return to ER today with increasing shortness of breath and tachycardia.  Work-up in the ER revealed large pleural effusion.  Patient denies any fever chills complains of fatigue, no cough        Review of Systems:    No fever chills  Weakness  No chest pain  Shortness of breath  Denies wheezing    Personal History     Past Medical History:   Diagnosis Date    COPD (chronic obstructive pulmonary disease)     Hyperlipidemia     Hypertension        Past Surgical History:   Procedure Laterality Date    CHOLECYSTECTOMY         Family History: family history is not on file. Otherwise pertinent FHx was reviewed and not pertinent to current issue.    Social History:  reports that she has quit smoking. Her smoking use included cigarettes. She smoked an average of .5 packs per day. She has never used smokeless tobacco. She reports that she does not currently use alcohol. She reports that she does not use drugs.    Home Medications:  HYDROcodone-acetaminophen, Oyster Shell Calcium/D, albuterol sulfate HFA, atorvastatin, clobetasol, famotidine, furosemide, ipratropium-albuterol, metoprolol succinate XL, multivitamin with minerals, naproxen, and traZODone      Allergies:  Allergies   Allergen Reactions    Buspar [Buspirone] Delirium       Objective   Objective     Vitals:   Temp:  [97.4 øF (36.3 øC)-98 øF (36.7 øC)] 97.4 øF (36.3 øC)  Heart Rate:  [] 93  Resp:   [19-20] 20  BP: (109-113)/(55-67) 109/55  Flow (L/min):  [2] 2    Physical Exam    Elderly cachectic female not in acute distress.  Tired and fatigued looking.  Neck supple  Heart regular  .  Lungs diminished breath sounds bilaterally with dullness to percussion  Abdomen obese and soft.  Extremities trace of edema around ankles    I have personally reviewed the results from the time of this admission to 8/15/2023 19:57 EDT and agree with these findings:  [x]  Laboratory  []  Microbiology  [x]  Radiology  []  EKG/Telemetry   []  Cardiology/Vascular   []  Pathology  []  Old records  []  Other:    CBC:    WBC   Date Value Ref Range Status   08/15/2023 8.21 3.40 - 10.80 10*3/mm3 Final     RBC   Date Value Ref Range Status   08/15/2023 4.12 3.77 - 5.28 10*6/mm3 Final     Hemoglobin   Date Value Ref Range Status   08/15/2023 13.1 12.0 - 15.9 g/dL Final     Hematocrit   Date Value Ref Range Status   08/15/2023 40.0 34.0 - 46.6 % Final     MCV   Date Value Ref Range Status   08/15/2023 97.1 (H) 79.0 - 97.0 fL Final     MCH   Date Value Ref Range Status   08/15/2023 31.8 26.6 - 33.0 pg Final     MCHC   Date Value Ref Range Status   08/15/2023 32.8 31.5 - 35.7 g/dL Final     RDW   Date Value Ref Range Status   08/15/2023 16.9 (H) 12.3 - 15.4 % Final     RDW-SD   Date Value Ref Range Status   08/15/2023 59.5 (H) 37.0 - 54.0 fl Final     MPV   Date Value Ref Range Status   08/15/2023 9.6 6.0 - 12.0 fL Final     Platelets   Date Value Ref Range Status   08/15/2023 304 140 - 450 10*3/mm3 Final     Neutrophil %   Date Value Ref Range Status   08/15/2023 82.0 (H) 42.7 - 76.0 % Final     Lymphocyte %   Date Value Ref Range Status   08/15/2023 5.2 (L) 19.6 - 45.3 % Final     Monocyte %   Date Value Ref Range Status   08/15/2023 10.6 5.0 - 12.0 % Final     Eosinophil %   Date Value Ref Range Status   08/15/2023 1.5 0.3 - 6.2 % Final     Basophil %   Date Value Ref Range Status   08/15/2023 0.5 0.0 - 1.5 % Final     Immature Grans %    Date Value Ref Range Status   08/15/2023 0.2 0.0 - 0.5 % Final     Neutrophils, Absolute   Date Value Ref Range Status   08/15/2023 6.73 1.70 - 7.00 10*3/mm3 Final     Lymphocytes, Absolute   Date Value Ref Range Status   08/15/2023 0.43 (L) 0.70 - 3.10 10*3/mm3 Final     Monocytes, Absolute   Date Value Ref Range Status   08/15/2023 0.87 0.10 - 0.90 10*3/mm3 Final     Eosinophils, Absolute   Date Value Ref Range Status   08/15/2023 0.12 0.00 - 0.40 10*3/mm3 Final     Basophils, Absolute   Date Value Ref Range Status   08/15/2023 0.04 0.00 - 0.20 10*3/mm3 Final     Immature Grans, Absolute   Date Value Ref Range Status   08/15/2023 0.02 0.00 - 0.05 10*3/mm3 Final     nRBC   Date Value Ref Range Status   08/15/2023 0.0 0.0 - 0.2 /100 WBC Final        BMP:    Lab Results   Component Value Date    GLUCOSE 93 08/15/2023    BUN 27 (H) 08/15/2023    CREATININE 0.73 08/15/2023    BCR 37.0 (H) 08/15/2023    K 4.3 08/15/2023    CO2 26.8 08/15/2023    CALCIUM 10.4 08/15/2023    ALBUMIN 2.8 (L) 08/15/2023    AST 22 08/15/2023    ALT 10 08/15/2023        XR Chest 1 View    Result Date: 8/15/2023    1. Small pneumothorax right apical area. 2.  Moderate right pleural effusion with airspace disease that may relate to atelectasis within the right lung. 3. Lucency left apical area without definitive pleural margin confirmed to suggest pneumothorax. 4. Left pleural effusion with probable left basilar atelectasis or infiltrate.       LATONYA DEGROOT MD       Electronically Signed and Approved By: LATONYA DEGROOT MD on 8/15/2023 at 11:58                     Assessment & Plan   Assessment / Plan       Current Diagnosis:  Active Hospital Problems    Diagnosis     Pleural effusion      Plan:   Patient with recurrent pleural effusion and symptoms of shortness of breath  Also has a small pneumothorax.  Patient had a chest tube few days ago.  Was admitted to hospital.  Pulmonary consult.  Oxygen supplementation.  Neb treatments.  IV  diuretics.  Further management we will clinical course, lab results and consultant input      DVT prophylaxis:  Medical DVT prophylaxis orders are present.    GI Prophylaxis:       Pepcid    CODE STATUS:    Code Status (Patient has no pulse and is not breathing): CPR (Attempt to Resuscitate)  Medical Interventions (Patient has pulse or is breathing): Full Support    Admission Status:  I believe this patient meets inpatient status.             I have dictated this note utilizing Dragon Dictation.             Please note that portions of this note were completed with a voice recognition program.             Part of this note may be an electronic transcription/translation of spoken language to printed text         using the Dragon Dictation System.       Electronically signed by René Garrett MD, 08/15/23, 7:57 PM EDT.    Total time spent with in evaluation and management: 37 minutes

## 2023-08-15 NOTE — ED PROVIDER NOTES
Time: 11:54 AM EDT  Date of encounter:  8/15/2023  Independent Historian/Clinical History and Information was obtained by:   Patient and Family    History is limited by: N/A    Chief Complaint   Patient presents with    Shortness of Breath    Weakness - Generalized         History of Present Illness:  Patient is a 78 y.o. year old female who presents to the emergency department for evaluation of SOA and weakness. Recent admission for pneumonia.Pursed lip breathing noted, coarse breath sounds on exam. (JHOAN Cox)      HPI    Patient Care Team  Primary Care Provider: René Garrett MD    Past Medical History:     Allergies   Allergen Reactions    Buspar [Buspirone] Delirium     Past Medical History:   Diagnosis Date    COPD (chronic obstructive pulmonary disease)     Hyperlipidemia     Hypertension      Past Surgical History:   Procedure Laterality Date    CHOLECYSTECTOMY       History reviewed. No pertinent family history.    Home Medications:  Prior to Admission medications    Medication Sig Start Date End Date Taking? Authorizing Provider   albuterol sulfate  (90 Base) MCG/ACT inhaler Inhale 2 puffs Every 4 (Four) Hours As Needed for Wheezing or Shortness of Air.    Paul Brown MD   atorvastatin (LIPITOR) 20 MG tablet Take 1 tablet by mouth Daily.    Paul Brown MD   Calcium Carb-Cholecalciferol (Oyster Shell Calcium/D) 500-10 MG-MCG tablet tablet Take 1 tablet by mouth Daily.    Paul Brown MD   clobetasol (TEMOVATE) 0.05 % external solution Apply 1 application  topically to the appropriate area as directed 2 (Two) Times a Day As Needed.    Paul Brown MD   clobetasol (TEMOVATE) 0.05 % ointment Apply 1 application  topically to the appropriate area as directed 2 (Two) Times a Day As Needed.    Paul Brown MD   famotidine (PEPCID) 20 MG tablet Take 1 tablet by mouth Daily for 30 days. 8/2/23 9/1/23  René Garrett MD   furosemide (LASIX) 20 MG  tablet Take 1 tablet by mouth Daily for 30 days. 8/2/23 9/1/23  René Garrett MD   HYDROcodone-acetaminophen (Norco) 5-325 MG per tablet Take 1 tablet by mouth Every 8 (Eight) Hours As Needed for Moderate Pain for up to 15 days. 8/1/23 8/16/23  René Garrett MD   ipratropium-albuterol (DUO-NEB) 0.5-2.5 mg/3 ml nebulizer Take 3 mL by nebulization Every 6 (Six) Hours As Needed for Shortness of Air for up to 30 days. 8/1/23 8/31/23  René Garrett MD   metoprolol succinate XL (TOPROL-XL) 25 MG 24 hr tablet Take 1 tablet by mouth Daily for 30 days. 8/1/23 8/31/23  René Garrett MD   multivitamin with minerals (Multi For Her 50+) tablet tablet Take 1 tablet by mouth Daily.    ProviderPaul MD   naproxen (NAPROSYN) 500 MG tablet Take 1 tablet by mouth 2 (Two) Times a Day As Needed for Mild Pain.    ProviderPaul MD   traZODone (DESYREL) 50 MG tablet Take 1 tablet by mouth At Night As Needed for Sleep. Very prn    ProviderPaul MD        Social History:   Social History     Tobacco Use    Smoking status: Former     Packs/day: 0.50     Types: Cigarettes    Smokeless tobacco: Never   Vaping Use    Vaping Use: Never used   Substance Use Topics    Alcohol use: Not Currently    Drug use: Never         Review of Systems:  Review of Systems   Constitutional:  Negative for chills and fever.   HENT:  Negative for congestion, rhinorrhea and sore throat.    Eyes:  Negative for pain and visual disturbance.   Respiratory:  Positive for cough and shortness of breath. Negative for apnea and chest tightness.    Cardiovascular:  Positive for chest pain. Negative for palpitations.   Gastrointestinal:  Negative for abdominal pain, diarrhea, nausea and vomiting.   Genitourinary:  Negative for difficulty urinating and dysuria.   Musculoskeletal:  Negative for joint swelling and myalgias.   Skin:  Negative for color change.   Neurological:  Negative for seizures and headaches.   Psychiatric/Behavioral: Negative.     All other  "systems reviewed and are negative.     Physical Exam:  /67 (BP Location: Left arm, Patient Position: Sitting)   Pulse 98   Temp 98 øF (36.7 øC) (Oral)   Resp 20   Ht 149.9 cm (59\")   Wt 30.9 kg (68 lb 2 oz)   SpO2 96%   BMI 13.76 kg/mý         Physical Exam  Vitals and nursing note reviewed.   Constitutional:       General: She is not in acute distress.     Appearance: Normal appearance. She is ill-appearing. She is not toxic-appearing.      Comments: Cachectic appearing   HENT:      Head: Normocephalic and atraumatic.      Jaw: There is normal jaw occlusion.   Eyes:      General: Lids are normal.      Extraocular Movements: Extraocular movements intact.      Conjunctiva/sclera: Conjunctivae normal.      Pupils: Pupils are equal, round, and reactive to light.   Cardiovascular:      Rate and Rhythm: Normal rate and regular rhythm.      Pulses: Normal pulses.      Heart sounds: Normal heart sounds.   Pulmonary:      Effort: Pulmonary effort is normal. Tachypnea present. No respiratory distress.      Breath sounds: Normal breath sounds. No wheezing or rhonchi.   Abdominal:      General: Abdomen is flat.      Palpations: Abdomen is soft.      Tenderness: There is no abdominal tenderness. There is no guarding or rebound.   Musculoskeletal:         General: Normal range of motion.      Cervical back: Normal range of motion and neck supple.      Right lower leg: No edema.      Left lower leg: No edema.   Skin:     General: Skin is warm and dry.   Neurological:      Mental Status: She is alert and oriented to person, place, and time. Mental status is at baseline.   Psychiatric:         Mood and Affect: Mood normal.                    Procedures:  Procedures      Medical Decision Making:      Comorbidities that affect care:    COPD    External Notes reviewed:    Hospital Discharge Summary: After admission for pneumonia and pleural effusion requiring thoracentesis and drainage      The following orders were " placed and all results were independently analyzed by me:  Orders Placed This Encounter   Procedures    XR Chest 1 View    [place] Draw    Comprehensive Metabolic Panel    BNP    Single High Sensitivity Troponin T    CBC Auto Differential    NPO Diet NPO Type: Strict NPO    Undress & Gown    Continuous Pulse Oximetry    Vital Signs    Code Status and Medical Interventions:    IP General Consult (Use specialty-specific consult if known)    Oxygen Therapy- Nasal Cannula; Titrate 1-6 LPM Per SpO2; 90 - 95%    ECG 12 Lead ED Triage Standing Order; SOA    Insert Peripheral IV    Inpatient Admission    CBC & Differential    Green Top (Gel)    Lavender Top    Gold Top - SST    Light Blue Top       Medications Given in the Emergency Department:  Medications   sodium chloride 0.9 % flush 10 mL (has no administration in time range)   morphine injection 2 mg (2 mg Intravenous Given 8/15/23 1430)        ED Course:    The patient was initially evaluated in the triage area where orders were placed. The patient was later dispositioned by Lloyd Stearns MD.      The patient was advised to stay for completion of workup which includes but is not limited to communication of labs and radiological results, reassessment and plan. The patient was advised that leaving prior to disposition by a provider could result in critical findings that are not communicated to the patient.          Labs:    Lab Results (last 24 hours)       Procedure Component Value Units Date/Time    CBC & Differential [[id number]]  (Abnormal) Collected: 08/15/23 1159    Specimen: Blood Updated: 08/15/23 1209    Narrative:      The following orders were created for panel order CBC & Differential.  Procedure                               Abnormality         Status                     ---------                               -----------         ------                     CBC Auto Differential[[id number]]        Abnormal            Final result                 Please view  results for these tests on the individual orders.    CBC Auto Differential [[id number]]  (Abnormal) Collected: 08/15/23 1159    Specimen: Blood Updated: 08/15/23 1209     WBC 8.21 10*3/mm3      RBC 4.12 10*6/mm3      Hemoglobin 13.1 g/dL      Hematocrit 40.0 %      MCV 97.1 fL      MCH 31.8 pg      MCHC 32.8 g/dL      RDW 16.9 %      RDW-SD 59.5 fl      MPV 9.6 fL      Platelets 304 10*3/mm3      Neutrophil % 82.0 %      Lymphocyte % 5.2 %      Monocyte % 10.6 %      Eosinophil % 1.5 %      Basophil % 0.5 %      Immature Grans % 0.2 %      Neutrophils, Absolute 6.73 10*3/mm3      Lymphocytes, Absolute 0.43 10*3/mm3      Monocytes, Absolute 0.87 10*3/mm3      Eosinophils, Absolute 0.12 10*3/mm3      Basophils, Absolute 0.04 10*3/mm3      Immature Grans, Absolute 0.02 10*3/mm3      nRBC 0.0 /100 WBC     Comprehensive Metabolic Panel [[id number]]  (Abnormal) Collected: 08/15/23 1222    Specimen: Blood from Arm, Left Updated: 08/15/23 1247     Glucose 93 mg/dL      BUN 27 mg/dL      Creatinine 0.73 mg/dL      Sodium 134 mmol/L      Potassium 4.3 mmol/L      Chloride 93 mmol/L      CO2 26.8 mmol/L      Calcium 10.4 mg/dL      Total Protein 5.4 g/dL      Albumin 2.8 g/dL      ALT (SGPT) 10 U/L      AST (SGOT) 22 U/L      Alkaline Phosphatase 118 U/L      Total Bilirubin 0.7 mg/dL      Globulin 2.6 gm/dL      A/G Ratio 1.1 g/dL      BUN/Creatinine Ratio 37.0     Anion Gap 14.2 mmol/L      eGFR 83.3 mL/min/1.73     Narrative:      GFR Normal >60  Chronic Kidney Disease <60  Kidney Failure <15    The GFR formula is only valid for adults with stable renal function between ages 18 and 70.    BNP [[id number]]  (Normal) Collected: 08/15/23 1222    Specimen: Blood from Arm, Left Updated: 08/15/23 1245     proBNP 913.9 pg/mL     Narrative:      Among patients with dyspnea, NT-proBNP is highly sensitive for the detection of acute congestive heart failure. In addition NT-proBNP of <300 pg/ml effectively rules out acute congestive heart  failure with 99% negative predictive value.      Single High Sensitivity Troponin T [[id number]]  (Abnormal) Collected: 08/15/23 1222    Specimen: Blood from Arm, Left Updated: 08/15/23 1247     HS Troponin T 28 ng/L     Narrative:      High Sensitive Troponin T Reference Range:  <10.0 ng/L- Negative Female for AMI  <15.0 ng/L- Negative Male for AMI  >=10 - Abnormal Female indicating possible myocardial injury.  >=15 - Abnormal Male indicating possible myocardial injury.   Clinicians would have to utilize clinical acumen, EKG, Troponin, and serial changes to determine if it is an Acute Myocardial Infarction or myocardial injury due to an underlying chronic condition.                  Imaging:    XR Chest 1 View    Result Date: 8/15/2023  PROCEDURE: XR CHEST 1 VW  COMPARISON: [facility], CT, CT CHEST WO CONTRAST DIAGNOSTIC, 7/27/2023, 18:03.  [facility], CR, XR CHEST 1 VW, 7/31/2023, 6:04.  INDICATIONS: SHORTNESS OF BREATH TODAY  FINDINGS:  There is a moderate right pleural effusion which has increased since prior exam.  There is a small apical pneumothorax on the right measuring 0.55 cm.  There is airspace disease within the right lung that may relate to atelectasis.  There is some lucency in the left apical area.  It is hard to definitively confirm pneumothorax.  There probably is a left pleural effusion.  Atelectasis or infiltrate at the left base not excluded.  The heart looks enlarged.        1. Small pneumothorax right apical area. 2.  Moderate right pleural effusion with airspace disease that may relate to atelectasis within the right lung. 3. Lucency left apical area without definitive pleural margin confirmed to suggest pneumothorax. 4. Left pleural effusion with probable left basilar atelectasis or infiltrate.       LATONYA DEGROOT MD       Electronically Signed and Approved By: LATONYA DEGROOT MD on 8/15/2023 at 11:58                Differential Diagnosis and Discussion:      Dyspnea:  Differential diagnosis includes but is not limited to metabolic acidosis, neurological disorders, psychogenic, asthma, pneumothorax, upper airway obstruction, COPD, pneumonia, noncardiogenic pulmonary edema, interstitial lung disease, anemia, congestive heart failure, and pulmonary embolism    All labs were reviewed and interpreted by me.  All X-rays impressions were independently interpreted by me.    MDM  Number of Diagnoses or Management Options  Pleural effusion  Pneumothorax, unspecified type  Diagnosis management comments: In summary this is an 78-year-old female who presents to the emergency department for evaluation of worsening shortness of breath and now with some chest discomfort/pain with breathing.  She was recently admitted and discharged from the hospital after having pneumonia and pleural effusions.  CBC independently reviewed by me and shows no critical abnormalities.  CMP independently reviewed by me and shows no critical abnormalities.  Chest x-ray today reveals recurrent pleural effusions but a new right apical pneumothorax.  This pneumothorax is most likely due to the previous chest tube that was inserted for pleural effusion drainage.  Patient will be admitted for further treatment.             Patient Care Considerations:    CONSULT: I considered consulting pulmonology, however this can be accomplished inpatient if necessary      Consultants/Shared Management Plan:    PCP: I have discussed the case with Dr. Garrett who agrees to accept the patient for admission.    Social Determinants of Health:    Patient is independent, reliable, and has access to care.       Disposition and Care Coordination:    Admit:   Through independent evaluation of the patient's history, physical, and imperical data, the patient meets criteria for observation/admission to the hospital.        Final diagnoses:   Pleural effusion   Pneumothorax, unspecified type        ED Disposition       ED Disposition   Decision to  Admit    Condition   --    Comment   Level of Care: Med/Surg [1]   Diagnosis: Pleural effusion [[id number]]   Admitting Physician: JOZEF KIMBLE [[id number]]   Certification: I Certify That Inpatient Hospital Services Are Medically Necessary For Greater Than 2 Midnights                 This medical record created using voice recognition software.             Lloyd Stearns MD  08/15/23 0143

## 2023-08-16 PROBLEM — J93.9 PNEUMOTHORAX: Status: ACTIVE | Noted: 2023-01-01

## 2023-08-16 NOTE — THERAPY EVALUATION
Patient Name: Sandra Sylvester  : 1945    MRN: [id number]                              Today's Date: 2023       Admit Date: 8/15/2023    Visit Dx:     ICD-10-CM ICD-9-CM   1. Pleural effusion  J90 511.9   2. Pneumothorax, unspecified type  J93.9 512.89     Patient Active Problem List   Diagnosis    Closed fracture of right hip    Closed nondisplaced fracture of acetabulum    HTN (hypertension)    Malnourished    BMI less than 19,adult    Severe malnutrition    Hypoxia    Pleural effusion     Past Medical History:   Diagnosis Date    COPD (chronic obstructive pulmonary disease)     Hyperlipidemia     Hypertension      Past Surgical History:   Procedure Laterality Date    CHOLECYSTECTOMY        General Information       Row Name 23          OT Time and Intention    Document Type evaluation  -PG     Mode of Treatment individual therapy;occupational therapy  -PG       Row Name 23          General Information    Patient Profile Reviewed yes  Lives with son and daughter.  Patient reports she is independent with rolling walker and does all her own self-care.  -PG     Prior Level of Function independent:;transfer;ADL's  -PG     Existing Precautions/Restrictions fall  -PG     Barriers to Rehab none identified  -PG       Row Name 23          Occupational Profile    Reason for Services/Referral (Occupational Profile) Patient is an 78-year-old female admitted for pleural effusion and unspecified pneumothorax.  No previous OT services identified.  Patient is being evaluated by Occupational Therapy due to recent decline in ADL function  -PG       Row Name 23          Living Environment    People in Home child(levi), adult  -PG       Row Name 23          Cognition    Orientation Status (Cognition) oriented x 3  -PG       Row Name 23          Safety Issues, Functional Mobility    Impairments Affecting Function (Mobility) balance;endurance/activity  tolerance;strength;shortness of breath  -PG               User Key  (r) = Recorded By, (t) = Taken By, (c) = Cosigned By      Initials Name Provider Type    PG Austin Casey OT Occupational Therapist                     Mobility/ADL's       Row Name 08/16/23 0926          Bed Mobility    Bed Mobility supine-sit  -PG     Supine-Sit [place] (Bed Mobility) moderate assist (50% patient effort)  -PG       Row Name 08/16/23 0926          Transfers    Transfers bed-chair transfer  -PG       Row Name 08/16/23 0926          Bed-Chair Transfer    Bed-Chair [place] (Transfers) contact guard;verbal cues  -PG     Assistive Device (Bed-Chair Transfers) walker, front-wheeled  -PG       Row Name 08/16/23 0926          Activities of Daily Living    BADL Assessment/Intervention bathing;upper body dressing;lower body dressing;grooming;toileting  -       Row Name 08/16/23 0926          Bathing Assessment/Intervention    [place] Level (Bathing) bathing skills;moderate assist (50% patient effort)  -PG       Row Name 08/16/23 0926          Upper Body Dressing Assessment/Training    [place] Level (Upper Body Dressing) upper body dressing skills;set up  -PG       Row Name 08/16/23 0926          Lower Body Dressing Assessment/Training    [place] Level (Lower Body Dressing) lower body dressing skills;maximum assist (25% patient effort)  -       Row Name 08/16/23 0926          Grooming Assessment/Training    [place] Level (Grooming) grooming skills;set up  -PG       Row Name 08/16/23 0926          Toileting Assessment/Training    [place] Level (Toileting) toileting skills;maximum assist (25% patient effort)  -PG               User Key  (r) = Recorded By, (t) = Taken By, (c) = Cosigned By      Initials Name Provider Type    PG Austin Casey OT Occupational Therapist                   Obj/Interventions       Row Name 08/16/23 0927          Sensory Assessment (Somatosensory)    Sensory Assessment  (Somatosensory) sensation intact  -PG       Row Name 08/16/23 0927          Vision Assessment/Intervention    Visual Impairment/Limitations WFL  -PG       Row Name 08/16/23 0927          Range of Motion Comprehensive    General Range of Motion no range of motion deficits identified  -PG       Row Name 08/16/23 0927          Strength Comprehensive (MMT)    Comment, General Manual Muscle Testing (MMT) Assessment 4 -/5 bilateral upper extremity  -PG       Row Name 08/16/23 0927          Motor Skills    Motor Skills coordination;functional endurance  -PG     Coordination WFL  -PG     Functional Endurance Fair minus  -PG               User Key  (r) = Recorded By, (t) = Taken By, (c) = Cosigned By      Initials Name Provider Type    PG Austin Casey, OT Occupational Therapist                   Goals/Plan       Row Name 08/16/23 0929          Transfer Goal 1 (OT)    Activity/Assistive Device (Transfer Goal 1, OT) transfers, all  -PG     [place] Level/Cues Needed (Transfer Goal 1, OT) modified independence  -PG     Time Frame (Transfer Goal 1, OT) long term goal (LTG);10 days  -PG       Row Name 08/16/23 0929          Bathing Goal 1 (OT)    Activity/Device (Bathing Goal 1, OT) bathing skills, all  -PG     [place] Level/Cues Needed (Bathing Goal 1, OT) modified independence  -PG     Time Frame (Bathing Goal 1, OT) long term goal (LTG);10 days  -PG       Row Name 08/16/23 0929          Dressing Goal 1 (OT)    Activity/Device (Dressing Goal 1, OT) dressing skills, all  -PG     [place]/Cues Needed (Dressing Goal 1, OT) modified independence  -PG     Time Frame (Dressing Goal 1, OT) long term goal (LTG);10 days  -PG       Row Name 08/16/23 0929          Toileting Goal 1 (OT)    Activity/Device (Toileting Goal 1, OT) toileting skills, all  -PG     [place] Level/Cues Needed (Toileting Goal 1, OT) modified independence  -PG     Time Frame (Toileting Goal 1, OT) long term goal (LTG);10 days  -PG       Row Name  08/16/23 0929          Grooming Goal 1 (OT)    Activity/Device (Grooming Goal 1, OT) grooming skills, all  -PG     [place] (Grooming Goal 1, OT) modified independence  -PG       Row Name 08/16/23 0929          Strength Goal 1 (OT)    Strength Goal 1 (OT) Patient will improve bilateral upper extremity strength to 4+/5 to support independence and engagement with ADL activities  -PG       Row Name 08/16/23 0929          Problem Specific Goal 1 (OT)    Problem Specific Goal 1 (OT) Patient will improve activity tolerance to fair plus to support independence with self-care tasks  -PG     Time Frame (Problem Specific Goal 1, OT) long term goal (LTG);10 days  -PG       Row Name 08/16/23 0929          Therapy Assessment/Plan (OT)    Planned Therapy Interventions (OT) activity tolerance training;BADL retraining;strengthening exercise;transfer/mobility retraining;patient/caregiver education/training;occupation/activity based interventions  -PG               User Key  (r) = Recorded By, (t) = Taken By, (c) = Cosigned By      Initials Name Provider Type    PG Austin Casey, PURA Occupational Therapist                   Clinical Impression       Row Name 08/16/23 0927          Pain Assessment    Pretreatment Pain Rating 0/10 - no pain  -PG     Posttreatment Pain Rating 0/10 - no pain  -PG       Row Name 08/16/23 0927          Plan of Care Review    Plan of Care Reviewed With patient  -PG     Progress no change  -PG     Outcome Evaluation Patient presents with limitations affecting strength, activity tolerance, and balance impacting patient's ability to return home safely and independently.  The skills of a therapist will be required to safely and effectively implement the following treatment plan to restore maximal level of function  -PG       Row Name 08/16/23 0927          Therapy Assessment/Plan (OT)    Patient/Family Therapy Goal Statement (OT) Get stronger and return home independently  -PG     Rehab Potential (OT) good,  to achieve stated therapy goals  -PG     Criteria for Skilled Therapeutic Interventions Met (OT) yes;meets criteria;skilled treatment is necessary  -PG     Therapy Frequency (OT) 5 times/wk  -PG       Row Name 08/16/23 0927          Therapy Plan Review/Discharge Plan (OT)    Anticipated Discharge Disposition (OT) skilled nursing facility  -PG               User Key  (r) = Recorded By, (t) = Taken By, (c) = Cosigned By      Initials Name Provider Type    PG Austin Casey OT Occupational Therapist                   Outcome Measures       Row Name 08/16/23 0934          How much help from another is currently needed...    Putting on and taking off regular lower body clothing? 2  -PG     Bathing (including washing, rinsing, and drying) 2  -PG     Toileting (which includes using toilet bed pan or urinal) 2  -PG     Putting on and taking off regular upper body clothing 3  -PG     Taking care of personal grooming (such as brushing teeth) 3  -PG     Eating meals 4  -PG     AM-PAC 6 Clicks Score (OT) 16  -PG       Row Name 08/16/23 0934          Functional Assessment    Outcome Measure Options AM-PAC 6 Clicks Daily Activity (OT);Optimal Instrument  -PG       Row Name 08/16/23 0934          Optimal Instrument    Optimal Instrument Optimal - 3  -PG     Bending/Stooping 3  -PG     Standing 2  -PG     Reaching 2  -PG     From the list, choose the 3 activities you would most like to be able to do without any difficulty Bending/stooping;Standing;Reaching  -PG     Total Score Optimal - 3 7  -PG               User Key  (r) = Recorded By, (t) = Taken By, (c) = Cosigned By      Initials Name Provider Type    PG Austin Casey OT Occupational Therapist                    Occupational Therapy Education       Title: PT OT SLP Therapies (Done)       Topic: Occupational Therapy (Done)       Point: ADL training (Done)       Description:   Instruct learner(s) on proper safety adaptation and remediation techniques during self care or  transfers.   Instruct in proper use of assistive devices.                  Learning Progress Summary             Patient Acceptance, E,D, DU by PG at 8/16/2023 0935                         Point: Home exercise program (Done)       Description:   Instruct learner(s) on appropriate technique for monitoring, assisting and/or progressing therapeutic exercises/activities.                  Learning Progress Summary             Patient Acceptance, E,D, DU by PG at 8/16/2023 0935                         Point: Precautions (Done)       Description:   Instruct learner(s) on prescribed precautions during self-care and functional transfers.                  Learning Progress Summary             Patient Acceptance, E,D, DU by PG at 8/16/2023 0935                         Point: Body mechanics (Done)       Description:   Instruct learner(s) on proper positioning and spine alignment during self-care, functional mobility activities and/or exercises.                  Learning Progress Summary             Patient Acceptance, E,D, DU by PG at 8/16/2023 0935                                         User Key       Initials Effective Dates Name Provider Type Discipline    PG 06/16/21 -  Austin Casey OT Occupational Therapist OT                  OT Recommendation and Plan  Planned Therapy Interventions (OT): activity tolerance training, BADL retraining, strengthening exercise, transfer/mobility retraining, patient/caregiver education/training, occupation/activity based interventions  Therapy Frequency (OT): 5 times/wk  Plan of Care Review  Plan of Care Reviewed With: patient  Progress: no change  Outcome Evaluation: Patient presents with limitations affecting strength, activity tolerance, and balance impacting patient's ability to return home safely and independently.  The skills of a therapist will be required to safely and effectively implement the following treatment plan to restore maximal level of function     Time Calculation:    Evaluation Complexity (OT)  Review Occupational Profile/Medical/Therapy History Complexity: brief/low complexity  Assessment, Occupational Performance/Identification of Deficit Complexity: 1-3 performance deficits  Clinical Decision Making Complexity (OT): problem focused assessment/low complexity  Overall Complexity of Evaluation (OT): low complexity     Time Calculation- OT       Row Name 08/16/23 0936             Time Calculation- OT    OT Received On 08/16/23  -PG      OT Goal Re-Cert Due Date 08/25/23  -PG         Untimed Charges    OT Eval/Re-eval Minutes 35  -PG         Total Minutes    Untimed Charges Total Minutes 35  -PG       Total Minutes 35  -PG                User Key  (r) = Recorded By, (t) = Taken By, (c) = Cosigned By      Initials Name Provider Type    PG Austin Casey OT Occupational Therapist                  Therapy Charges for Today       Code Description Service Date Service Provider Modifiers Qty    [id number] HC OT EVAL LOW COMPLEXITY 3 8/16/2023 Austin Casey OT GO 1                 Austin Casey OT  8/16/2023

## 2023-08-16 NOTE — PLAN OF CARE
Goal Outcome Evaluation:  Plan of Care Reviewed With: patient        Progress: no change  Outcome Evaluation: Patient presents with limitations affecting strength, activity tolerance, and balance impacting patient's ability to return home safely and independently.  The skills of a therapist will be required to safely and effectively implement the following treatment plan to restore maximal level of function      Anticipated Discharge Disposition (OT): skilled nursing facility

## 2023-08-16 NOTE — PROCEDURES
PROCEDURE: Chest Tube Insertion    IINDICATION: Empyema    PROCEDURE : Dr. Kassandra Houser    CONSENT: Consent was obtained from patient prior to the procedure. Indications, risks, and benefits were explained at length.      PROCEDURE SUMMARY:     A time out was performed. The appropriate side was confirmed and marked. The patient was prepped and draped in a sterile manner using chlorhexidine scrub after the patient was positioned in the usual fashion. A total of 10 ml of 1% lidocaine was used to anesthetize the skin, subcutaneous tissue, superior aspect of the rib periosteum and parietal pleura.     A needle was inserted into the pleural place using US guidance. Pleural fluid was aspirated. A guidewire was inserted into the needle. The needle was removed and guidewire in place. A small incision was made at the location of the insertion site. A dilator was used to dilate the track into the pleural space. A 14 [place] chest tube was then inserted via the guidewire. The chest tube was sutured to the skin at the insertion site, and connected securely with tape to a Pleur-evac. A sterile occlusive dressing was placed over the insertion site. No immediate complications were noted.     A post-procedure chest x-ray is pending at the time of this note.      Estimated blood loss: < 10 mL    Electronically signed by Kassandra Houser MD, 08/16/23, 3:38 PM EDT.

## 2023-08-16 NOTE — PROCEDURES
Procedure:Thoracentesis     Indication: Moderate right sided pleural effusion    Consent: Yes, placed in chart.    Procedure: The patient was placed in the sitting position and the appropriate landmarks were identified. The skin over the puncture site in the right posterior chest wall was prepped with ChloraPrep and draped in sterile fashion. Local anesthesia was applied with 1% lidocaine. Thoracentesis catheter was inserted with needle guidance. Pleural fluid was thick milky consistent with empyema, drained 50 mL fluid. The catheter was then withdrawn in preparation for chest tube placement. The patient tolerated the procedure well.    Complications: None    Electronically signed by Kassandra Houser MD, 8/16/2023, 15:37 EDT.

## 2023-08-16 NOTE — CONSULTS
Pulmonary / Critical Care Consult Note      Patient Name: Sandra Sylvester  : 1945  MRN: [id number]  Primary Care Physician:  René Garrett MD  Referring Physician: René Garrett MD  Date of admission: 8/15/2023    Subjective   Subjective     Reason for Consult/ Chief Complaint:   Pleural effusion    HPI:  Sandra Sylvester is a 78 y.o. female with history of COPD, recent admission for large right-sided pleural effusion, presents to the ED with shortness of breath.  Of note patient was discharged about 10 days ago for similar presentation in which patient had a large loculated right-sided pleural effusion.  A chest tube was placed at that time and tPA dornase medication was given during the hospitalization stay.  He was sent home on antibiotics.  In the ED patient was found to be hypoxic and placed on oxygen.  Initial labs show .  Creatinine 0.73.  Chest x-ray showed a large right-sided pleural effusion.  At bedside patient reports shortness of breath and dyspnea on exertion.  A bedside ultrasound showed a moderate to large right-sided pleural effusion.  We discussed thoracentesis with patient and she expressed understanding and would like to proceed.  During the procedure, thick cloudy fluid was drained during thoracentesis with total of 50 cc output.  The decision was made to place a chest tube empyema findings.  After chest tube was placed over a 1.2 L of milky thick fluid drained.  Post chest x-ray pending.  Patient reports feeling much better.    Review of Systems  Constitutional symptoms:  Denied complaints   Ear, nose, throat: Denied complaints  Cardiovascular:  Denied complaints  Respiratory: +shortness of breath; +dyspnea; Denied complaints  Gastrointestinal: Denied complaints  Musculoskeletal: Denied complaints  Genitourinary: Denied complaints  Allergy / Immunology: Denied complaints  Hematologic: Denied complaints  Neurologic: Denied complaints  Skin: Denied complaints  Endocrine: Denied  complaints  Psychiatric: Denied complaints      Personal History     Past Medical History:   Diagnosis Date    COPD (chronic obstructive pulmonary disease)     Hyperlipidemia     Hypertension        Past Surgical History:   Procedure Laterality Date    CHOLECYSTECTOMY         Family History: family history is not on file. Otherwise pertinent FHx was reviewed and not pertinent to current issue.    Social History:  reports that she has quit smoking. Her smoking use included cigarettes. She smoked an average of .5 packs per day. She has never used smokeless tobacco. She reports that she does not currently use alcohol. She reports that she does not use drugs.    Home Medications:  HYDROcodone-acetaminophen, Oyster Shell Calcium/D, albuterol sulfate HFA, atorvastatin, clobetasol, famotidine, furosemide, ipratropium-albuterol, metoprolol succinate XL, multivitamin with minerals, naproxen, and traZODone    Allergies:  Allergies   Allergen Reactions    Buspar [Buspirone] Delirium       Objective    Objective     Vitals:   Temp:  [97.3 øF (36.3 øC)-98 øF (36.7 øC)] 97.5 øF (36.4 øC)  Heart Rate:  [] 120  Resp:  [16-20] 16  BP: ()/(46-94) 126/74  Flow (L/min):  [2-3] 3    Physical Exam:  Vital Signs Reviewed   General:  WDWN, Alert, NAD.  Elderly, thin female, sitting up in bed  HEENT:  PERRL, EOMI.  OP, nares clear, no sinus tenderness  Neck:  Supple, no JVD, no thyromegaly  Lymph: no axillary, cervical, supraclavicular lymphadenopathy noted bilaterally  Chest:  diminished R>L on auscultation bilaterally, no work of breathing noted on 2L NC; R posterior chest tube to suction  CV: RRR, no MGR, pulses 2+, equal.  Abd:  Soft, NT, ND, + BS  EXT:  no clubbing, no cyanosis, no edema, no joint tenderness  Neuro:  A&Ox3, CN grossly intact, no focal deficits.  Skin: No rashes or lesions noted    Result Review    Result Review:  I have personally reviewed the results from the time of this admission to 8/16/2023 15:42 EDT  and agree with these findings:  []  Laboratory  []  Microbiology  []  Radiology  []  EKG/Telemetry   []  Cardiology/Vascular   []  Pathology  []  Old records  []  Other:    Most notable findings include:   -     Assessment & Plan   Assessment / Plan     Active Hospital Problems:  Active Hospital Problems    Diagnosis     Pneumothorax     Pleural effusion     Hypoxia     BMI less than 19,adult        Impression:  Acute hypoxic respiratory failure  Right-sided empyema that is post chest tube placement 8/16  COPD exacerbation  Chronic smoker  Severe malnutrition     Plan:  -Currently on 2 L nasal cannula; continue to wean as tolerated keep SPO2 greater than 90%  -Chest tube placed 8/16 for empyema; 1.2L output from initial drain; post procedure XR pending  -Keep chest tube to -20 cc of water suction  -May require TPA/DNAse depending on drainage and output  -Continue broad-spectrum antibiotics for now; de-escalate based on cultures  -Start nebs and bronchopulmonary hygiene  -Encourage I-S and flutter valve use  -Recommend PT/OT    DVT prophylaxis:  Medical DVT prophylaxis orders are present.     Code Status and Medical Interventions:   Ordered at: 08/15/23 1436     Code Status (Patient has no pulse and is not breathing):    CPR (Attempt to Resuscitate)     Medical Interventions (Patient has pulse or is breathing):    Full Support          Labs, imaging, notes, and medications personally reviewed.    Thank you for involving me in the care of this patient.    Electronically signed by Kassandra Houser MD, 08/16/23, 3:42 PM EDT.

## 2023-08-16 NOTE — CONSULTS
"Nutrition Services    Patient Name: Sandra Sylvester  YOB: 1945  MRN: [id number]  Admission date: 8/15/2023      CLINICAL NUTRITION ASSESSMENT      Reason for Assessment  Identified at risk by screening criteria, BMI     H&P:    Past Medical History:   Diagnosis Date    COPD (chronic obstructive pulmonary disease)     Hyperlipidemia     Hypertension         Current Problems:   Active Hospital Problems    Diagnosis     Pleural effusion         Nutrition/Diet History         Narrative     Patient admitted with pleural effusion. Followed by RD for BMI <18.5.    Patient has had significant weight loss over the last year. Severe muscle wasting and fat loss noted at time of physical exam. Patient relates she tries drink Ensure/Boost at home three times daily but usually only drinks it twice daily. Encouraged patient to increase frequency to at least three times daily to help meet estimated nutrient needs. Patient is receptive to Boost three times daily this admission. Will order and CTM per protocol.       Anthropometrics        Current Height, Weight Height: 149.9 cm (59\")  Weight: 32.2 kg (71 lb)   Current BMI Body mass index is 14.34 kg/mý.       Weight Hx  Wt Readings from Last 30 Encounters:   08/15/23 1738 32.2 kg (71 lb)   08/15/23 1140 30.9 kg (68 lb 2 oz)   07/27/23 1254 29.6 kg (65 lb 4.1 oz)   07/20/23 0112 33.6 kg (74 lb 1.2 oz)   07/19/23 1850 34.1 kg (75 lb 2.8 oz)   01/13/23 0600 38.4 kg (84 lb 10.5 oz)   01/12/23 0555 37.6 kg (82 lb 14.3 oz)   01/11/23 0625 38.4 kg (84 lb 10.5 oz)   01/10/23 0452 38.9 kg (85 lb 12.1 oz)   01/10/23 0155 38.9 kg (85 lb 12.1 oz)   01/09/23 2025 42 kg (92 lb 9.5 oz)   09/01/22 1426 40.8 kg (90 lb)   08/01/22 1431 40.8 kg (90 lb)   06/07/22 1016 40.8 kg (90 lb)   05/07/22 1924 40.9 kg (90 lb 2.7 oz)            Wt Change Observation -21% x 1 year      Estimated/Assessed Needs       Energy Requirements 35-40 kcal/kg   EST Needs (kcal/day) 0510-3217       Protein " Requirements 1.8-2.0 g/kg   EST Daily Needs (g/day) 58-65       Fluid Requirements 30 ml/kg    Estimated Needs (mL/day) 1000     Labs/Medications         Pertinent Labs Reviewed.   Results from last 7 days   Lab Units 08/16/23  0457 08/15/23  1222   SODIUM mmol/L 137 134*   POTASSIUM mmol/L 4.4 4.3   CHLORIDE mmol/L 101 93*   CO2 mmol/L 26.5 26.8   BUN mg/dL 24* 27*   CREATININE mg/dL 0.69 0.73   CALCIUM mg/dL 9.7 10.4   BILIRUBIN mg/dL  --  0.7   ALK PHOS U/L  --  118*   ALT (SGPT) U/L  --  10   AST (SGOT) U/L  --  22   GLUCOSE mg/dL 77 93     Results from last 7 days   Lab Units 08/16/23  0457   HEMOGLOBIN g/dL 11.6*   HEMATOCRIT % 38.0     COVID19   Date Value Ref Range Status   07/19/2023 Not Detected Not Detected - Ref. Range Final     No results found for: HGBA1C      Pertinent Medications Reviewed.     Current Nutrition Orders & Evaluation of Intake       Oral Nutrition     Current PO Diet Diet: Cardiac Diets, Diabetic Diets; Healthy Heart (2-3 Na+); Consistent Carbohydrate; Texture: Soft to Chew (NDD 3); Soft to Chew: Whole Meat; Fluid Consistency: Thin (IDDSI 0)   Supplement No active supplement orders       Malnutrition Severity Assessment      Patient meets criteria for : Severe Malnutrition  Malnutrition Type (last 8 hours)       Malnutrition Severity Assessment       Row Name 08/16/23 1226       Malnutrition Severity Assessment    Malnutrition Type Chronic Disease - Related Malnutrition      Row Name 08/16/23 1226       Insufficient Energy Intake     Insufficient Energy Intake Findings None      Row Name 08/16/23 1226       Unintentional Weight Loss     Unintentional Weight Loss Findings Severe    Unintentional Weight Loss  Weight loss greater than 20% in one year      Row Name 08/16/23 1226       Muscle Loss    Loss of Muscle Mass Findings Severe    [place] Region Severe - deep hollowing/scooping, lack of muscle to touch, facial bones well defined    Clavicle Bone Region Severe - protruding prominent  bone    Acromion Bone Region Severe - squared shoulders, bones, and acromion process protrusion prominent    Dorsal Hand Region Severe - prominent depression    Patellar Region Severe - prominent bone, square looking, very little muscle definition    Anterior Thigh Region Severe - line/depression along thigh, obviously thin    Posterior Calf Region Severe - thin with very little definition/firmness      Row Name 08/16/23 1226       Fat Loss    Subcutaneous Fat Loss Findings Severe    Orbital Region  Severe - pronounced hollowness/depression, dark circles, loose saggy skin    Upper Arm Region Severe - mostly skin, very little space between folds, fingers touch    Thoracic & Lumbar Region Severe - ribs visible with prominent depressions, iliac crest very prominent      Row Name 08/16/23 1226       Criteria Met (Must meet criteria for severity in at least 2 of these categories: M Wasting, Fat Loss, Fluid, Secondary Signs, Wt. Status, Intake)    Patient meets criteria for  Severe Malnutrition                       Nutrition Diagnosis         Nutrition Dx Problem 1 Severe malnutrition related to increased nutrient needs due to catabolic disease as evidenced by inadequate energy intake., unintended wt change., body composition changes., patient report., and report of minimal PO intake.       Nutrition Intervention         Chocolate or vanilla Boost Plus TID (+1080 kcal, 42 g protein daily)     Medical Nutrition Therapy/Nutrition Education          Learner     Readiness Patient  Acceptance     Method     Response Explanation  Verbalizes understanding     Monitor/Evaluation        Monitor Per protocol, PO intake, Supplement intake, Pertinent labs, Weight       Nutrition Discharge Plan         Continue ONS TID       Electronically signed by:  Mahamed Reich RD  08/16/23 12:35 EDT

## 2023-08-16 NOTE — PROCEDURES
Procedure: Bedside thoracic ultrasound:    Left showed B lines, curtain sign seen.    Right side showed moderate to large effusion with anechoic space between lung and diaphragm.     Site marked for thoracentesis.    Images stored online.     Electronically signed by Kassandra Houser MD, 8/16/2023, 14:41 EDT.

## 2023-08-16 NOTE — PROGRESS NOTES
[facility]     Progress Note    Patient Name: Sandra Sylvester  : 1945  MRN: [id number]  Primary Care Physician:  René Garrett MD  Date of admission: 8/15/2023      Subjective   Brief summary.  Admitted with increasing pleural effusion and pneumothorax      HPI:  No new complaints, patient in chair today but very restless, complains of lower back pain and pain in the sacral area.  Breathing improving.    Review of Systems     No fever chills, shortness of breath, back pain.      Objective     Vitals:   Temp:  [97.3 øF (36.3 øC)-98 øF (36.7 øC)] 98 øF (36.7 øC)  Heart Rate:  [] 106  Resp:  [16-20] 16  BP: ()/(46-94) 126/94  Flow (L/min):  [2] 2    Physical Exam :     Elderly female cachectic not in acute distress but restless.  Heart regular slightly tacky.  Lungs diminished breath sounds.  Abdomen soft and scaphoid.  Extremities free of any edema, tenderness over the lumbar spine and paraspinal area and sacral area      Result Review:  I have personally reviewed the results from the time of this admission to 2023 14:57 EDT and agree with these findings:  [x]  Laboratory  []  Microbiology  []  Radiology  []  EKG/Telemetry   []  Cardiology/Vascular   []  Pathology  []  Old records  []  Other:           Assessment / Plan       Active Hospital Problems:  Active Hospital Problems    Diagnosis     Pneumothorax     Pleural effusion     Hypoxia     BMI less than 19,adult        Plan:   Patient to be evaluated by pulmonary team.  May need thoracentesis.  Continue diuresis for now.  Pain meds as needed.  PT OT consult.  Repeat chest x-ray in a.m.    DVT prophylaxis:  Medical DVT prophylaxis orders are present.    CODE STATUS:   Code Status (Patient has no pulse and is not breathing): CPR (Attempt to Resuscitate)  Medical Interventions (Patient has pulse or is breathing): Full Support            Electronically signed by René Garrett MD, 23, 2:56 PM EDT.

## 2023-08-16 NOTE — OUTREACH NOTE
COPD/PN Week 2 Survey      Flowsheet Row Responses   [facility] patient discharged from? [facility]   Does the patient have one of the following disease processes/diagnoses(primary or secondary)? Pneumonia   Week 2 attempt successful? No   Unsuccessful attempts Attempt 1   Revoke Readmitted            SRIRAM SARAVIA - Registered Nurse

## 2023-08-16 NOTE — PLAN OF CARE
Goal Outcome Evaluation:         Chest tube placed at bedside. Pt tolerated well. Samples sent to lab. New bed arrived. Vital signs stable.

## 2023-08-16 NOTE — SIGNIFICANT NOTE
Wound Eval / Progress Noted    [facility]     Patient Name: Sandra Sylvester  : 1945  MRN: [id number]  Today's Date: 2023                 Admit Date: 8/15/2023    Visit Dx:    ICD-10-CM ICD-9-CM   1. Pleural effusion  J90 511.9   2. Pneumothorax, unspecified type  J93.9 512.89         Pleural effusion        Past Medical History:   Diagnosis Date    COPD (chronic obstructive pulmonary disease)     Hyperlipidemia     Hypertension         Past Surgical History:   Procedure Laterality Date    CHOLECYSTECTOMY           Physical Assessment:  Wound 08/15/23 1720 Bilateral coccyx (Active)   Wound Image   08/15/23 1720   Pressure Injury Stage U 23 1149   Dressing Appearance dry;intact 23 1149   Closure None 23 1149   Base moist;slough;yellow 23 1149   Periwound dry;intact;pink;redness;blanchable 23 1149   Periwound Temperature warm 23 1149   Periwound Skin Turgor soft 23 1149   Edges open 23 1149   Wound Length (cm) 1.1 cm 23 1149   Wound Width (cm) 0.3 cm 23 1149   Wound Depth (cm) 0.1 cm 23 1149   Wound Surface Area (cm^2) 0.33 cm^2 23 1149   Wound Volume (cm^3) 0.033 cm^3 23 1149   Drainage Characteristics/Odor serous 23 1149   Drainage Amount scant 23 1149   Care, Wound cleansed with;sterile normal saline 23 1149   Dressing Care dressing applied;hydrofiber;silver impregnated;silicone;border dressing 23 1149   Periwound Care absorptive dressing applied 23 1149      Wound Check / Follow-up:  Patient seen today for wound consult. Patient with unstageable pressure injury to sacrum. Moist sloughing tissue present obscuring full view of wound base. Cleansed with normal saline and gauze. Applied silver impregnated hydrofiber and secured with silicone border dressing. Recommending daily dressing changes. Recommending skin protection with application of barrier cream to surrounding tissue. Implement every two  hour turns, offload heels, keep patient free from moisture. Specialty mattress ordered.      Impression: Unstageable pressure injury.      Short term goals:  Regain skin integrity, moisture prevention, pressure reduction, skin protection, daily dressing changes.      Delores Kam RN    8/16/2023    14:54 EDT

## 2023-08-17 NOTE — PLAN OF CARE
Goal Outcome Evaluation:  Plan of Care Reviewed With: patient        Progress: no change  Outcome Evaluation: Patient presents with deficits in balance, endurance, transfers, and ambulation. Patient will benefit from skilled PT services to address these mobility deficits and decrease risk of falls.      Anticipated Discharge Disposition (PT): home with home health

## 2023-08-17 NOTE — PROGRESS NOTES
Pulmonary / Critical Care Progress Note      Patient Name: Sandra Sylvester  : 1945  MRN: [id number]  Attending:  René Garrett MD  Date of admission: 8/15/2023    Subjective   Subjective   Follow-up for pleural effusions    Over past 24 hours:  Doing okay today  Chest tube with total output 1.8 L of milky fluid  Repeat chest x-ray showed left-sided pleural effusion  Bedside ultrasound showed moderate to large left-sided effusion  Thoracentesis performed at bedside with 800 cc of milky fluid output    Review of Systems  General: Denied complaints  Cardiovascular:  Denied complaints  Respiratory: Denied complaints  Gastrointestinal: Denied complaints        Objective   Objective     Vitals:   Temp:  [97.4 øF (36.3 øC)-97.8 øF (36.6 øC)] 97.6 øF (36.4 øC)  Heart Rate:  [] 91  Resp:  [16-18] 18  BP: (100-126)/(46-83) 118/54  Flow (L/min):  [2-3] 2    Physical Exam   Vital Signs Reviewed   General:  WDWN, Alert, NAD.    HEENT:  PERRL, EOMI.  OP, nares clear  Chest:  diminished L>R on auscultation bilaterally, no work of breathing noted on 2 L; right-sided chest tube to suction  CV: RRR, no MGR, pulses 2+, equal.  Abd:  Soft, NT, ND, + BS  EXT:  no clubbing, no cyanosis, no edema  Neuro:  A&Ox3, CN grossly intact, no focal deficits.  Skin: No rashes or lesions noted      Result Review    Result Review:  I have personally reviewed the results from the time of this admission to 2023 13:55 EDT and agree with these findings:  []  Laboratory  []  Microbiology  []  Radiology  []  EKG/Telemetry   []  Cardiology/Vascular   []  Pathology  []  Old records  []  Other:  Most notable findings include:   -     Assessment & Plan   Assessment / Plan     Active Hospital Problems:  Active Hospital Problems    Diagnosis     Pneumothorax     Pleural effusion     Hypoxia     BMI less than 19,adult          Impression:  Acute hypoxic respiratory failure  Right-sided empyema that is post chest tube placement   COPD  exacerbation  Lactic acidosis  Chronic smoker  Severe malnutrition      Plan:  -Currently on 2 L nasal cannula; continue to wean as tolerated keep SPO2 greater than 90%  -Chest tube placed 8/16 for empyema; total output 1.8L (R side fluid: , total protein pending) Fluid does meet exudative per lights criteria.  -Keep chest tube to -20 cc of water suction  -May require TPA/DNAse depending on drainage and output  -Bedside ultrasound also showed a moderate to large left-sided pleural effusion; decision was made to perform thoracentesis on the side.  Risk and benefits of procedure explained to patient.  Risks include bleeding, infection, pneumothorax.  Patient expressed understanding would like to proceed.  -Status post thoracentesis 8/17 with 800 cc milky fluid output.  Fluid studies sent to lab.  -There is concern for chylothorax.  Fluid cholesterol and triglyceride pending at this time.  -Continue broad-spectrum antibiotics for now; de-escalate based on cultures  -Continue nebs and bronchopulmonary hygiene  -Encourage I-S and flutter valve use  -Trend lactate until clearance  -Recommend PT/OT    DVT prophylaxis:  Medical DVT prophylaxis orders are present.    CODE STATUS:   Code Status (Patient has no pulse and is not breathing): CPR (Attempt to Resuscitate)  Medical Interventions (Patient has pulse or is breathing): Full Support    Labs, images, and medications personally reviewed.  Discussed with patient  Electronically signed by Kassandra Houser MD, 08/17/23, 1:58 PM EDT.

## 2023-08-17 NOTE — SIGNIFICANT NOTE
08/17/23 1509   Spiritual Care   Use of Spiritual Resources prayer;spirituality for coping, indicated strong use of   Spiritual Care Source  initiative   Spiritual Care Follow-Up follow-up planned regularly for general support   Response to Spiritual Care receptive of support;engaged in conversation;emotion expressed   Spiritual Care Interventions prayer support provided   Spiritual Care Visit Type initial   Spiritual Care Request prayer support;spiritual/moral support;coping/stress of illness support   Receptivity to Spiritual Care visit welcomed

## 2023-08-17 NOTE — PROCEDURES
Procedure: Bedside thoracic ultrasound:    Left side showed moderate to large effusion with anechoic space between lung and diaphragm.     Site marked for thoracentesis.    Images stored online.     Electronically signed by Kassandra Houser MD, 8/17/2023, 12:06 EDT.

## 2023-08-17 NOTE — PROCEDURES
Procedure:Thoracentesis     Indication: Moderate left sided pleural effusion    Consent: Yes, placed in chart.    Procedure: The patient was placed in the sitting position and the appropriate landmarks were identified. The skin over the puncture site in the left posterior chest wall was prepped with ChloraPrep and draped in sterile fashion. Local anesthesia was applied with 1% lidocaine. Thoracentesis catheter was inserted with needle guidance. Pleural fluid was milky colored, drained 900 mL fluid. The catheter was then withdrawn and a sterile dressing was placed over the site. A post-procedure film is pending at this time.    The patient tolerated the procedure well.    Complications: None    Electronically signed by Kassandra Houser MD, 8/17/2023, 12:06 EDT.

## 2023-08-17 NOTE — THERAPY EVALUATION
Acute Care - Physical Therapy Initial Evaluation   [facility]     Patient Name: Sandra Sylvester  : 1945  MRN: [id number]  Today's Date: 2023      Visit Dx:     ICD-10-CM ICD-9-CM   1. Pleural effusion  J90 511.9   2. Pneumothorax, unspecified type  J93.9 512.89   3. Difficulty walking  R26.2 719.7     Patient Active Problem List   Diagnosis    Closed fracture of right hip    Closed nondisplaced fracture of acetabulum    HTN (hypertension)    Malnourished    BMI less than 19,adult    Severe malnutrition    Hypoxia    Pleural effusion    Pneumothorax     Past Medical History:   Diagnosis Date    COPD (chronic obstructive pulmonary disease)     Hyperlipidemia     Hypertension      Past Surgical History:   Procedure Laterality Date    CHOLECYSTECTOMY       PT Assessment (last 12 hours)       PT Evaluation and Treatment       Row Name 23 1400          Physical Therapy Time and Intention    Document Type evaluation  -AV     Mode of Treatment individual therapy;physical therapy  -AV       Row Name 23 1400          General Information    Patient Profile Reviewed yes  -AV     Prior Level of Function independent:;all household mobility;gait;transfer;ADL's  Ambulated with rolling walker. 2 L O2 as needed.  -AV     Equipment Currently Used at Home walker, rolling  -AV     Existing Precautions/Restrictions fall;other (see comments)  chest tube  -AV       Row Name 23 1400          Living Environment    Current Living Arrangements home  -AV     Home Accessibility stairs to enter home  -AV     People in Home child(levi), adult  Son and daughter  -AV       Row Name 23 1400          Home Main Entrance    Number of Stairs, Main Entrance four  -AV     Stair Railings, Main Entrance railings on both sides of stairs  -AV       Row Name 23 1400          Range of Motion (ROM)    Range of Motion bilateral lower extremities;ROM is WFL  -AV       Row Name 23 1400          Strength (Manual Muscle  Testing)    Strength (Manual Muscle Testing) bilateral lower extremities;strength is WFL  -AV       Row Name 08/17/23 1400          Bed Mobility    Bed Mobility supine-sit  -AV     Supine-Sit [place] (Bed Mobility) minimum assist (75% patient effort)  -AV       Row Name 08/17/23 1400          Transfers    Transfers sit-stand transfer;stand-sit transfer  -AV       Row Name 08/17/23 1400          Sit-Stand Transfer    Sit-Stand [place] (Transfers) contact guard  -AV     Assistive Device (Sit-Stand Transfers) --  HHA  -AV       Row Name 08/17/23 1400          Stand-Sit Transfer    Stand-Sit [place] (Transfers) contact guard  -AV     Assistive Device (Stand-Sit Transfers) --  HHA  -AV       Row Name 08/17/23 1400          Gait/Stairs (Locomotion)    Gait/Stairs Locomotion gait/ambulation independence;gait/ambulation assistive device;distance ambulated  -AV     [place] Level (Gait) contact guard  -AV     Assistive Device (Gait) --  HHA  -AV     Distance in Feet (Gait) 5  -AV       Row Name 08/17/23 1400          Safety Issues, Functional Mobility    Impairments Affecting Function (Mobility) balance;endurance/activity tolerance;shortness of breath  -AV       Row Name 08/17/23 1400          Balance    Balance Assessment standing dynamic balance  -AV     Dynamic Standing Balance contact guard  -AV     Position/Device Used, Standing Balance supported  HHA  -AV       Row Name             Wound 08/15/23 1720 Bilateral coccyx    Wound - Properties Group Placement Date: 08/15/23  -[place] Placement Time: 1720  -[place] Present on Hospital Admission: Y  -[place] Side: Bilateral  -[place] Location: coccyx  -[place]    Retired Wound - Properties Group Placement Date: 08/15/23  -[place] Placement Time: 1720  -[place] Present on Hospital Admission: Y  -[place] Side: Bilateral  -[place] Location: coccyx  -[place]    Retired Wound - Properties Group Date first assessed: 08/15/23  -[place] Time first assessed: 1720  -[place] Present on Hospital Admission: Y  -[place] Side:  Bilateral  -[place] Location: coccyx  -[place]      Row Name 08/17/23 1400          Plan of Care Review    Plan of Care Reviewed With patient  -AV     Progress no change  -AV     Outcome Evaluation Patient presents with deficits in balance, endurance, transfers, and ambulation. Patient will benefit from skilled PT services to address these mobility deficits and decrease risk of falls.  -AV       Row Name 08/17/23 1400          Therapy Assessment/Plan (PT)    Rehab Potential (PT) good, to achieve stated therapy goals  -AV     Criteria for Skilled Interventions Met (PT) yes;meets criteria  -AV     Therapy Frequency (PT) daily  -AV     Predicted Duration of Therapy Intervention (PT) 10 days  -AV     Problem List (PT) problems related to;balance;mobility  -AV     Activity Limitations Related to Problem List (PT) unable to transfer safely;unable to ambulate safely  -AV       Row Name 08/17/23 1400          PT Evaluation Complexity    History, PT Evaluation Complexity 1-2 personal factors and/or comorbidities  -AV     Examination of Body Systems (PT Eval Complexity) total of 4 or more elements  -AV     Clinical Presentation (PT Evaluation Complexity) stable  -AV     Clinical Decision Making (PT Evaluation Complexity) low complexity  -AV     Overall Complexity (PT Evaluation Complexity) low complexity  -AV       Row Name 08/17/23 1400          Therapy Plan Review/Discharge Plan (PT)    Therapy Plan Review (PT) evaluation/treatment results reviewed;patient  -AV       Row Name 08/17/23 1400          Physical Therapy Goals    Bed Mobility Goal Selection (PT) bed mobility, PT goal 1  -AV     Transfer Goal Selection (PT) transfer, PT goal 1  -AV     Gait Training Goal Selection (PT) gait training, PT goal 1  -AV       Row Name 08/17/23 1400          Bed Mobility Goal 1 (PT)    Activity/Assistive Device (Bed Mobility Goal 1, PT) bed mobility activities, all  -AV     [place] Level/Cues Needed (Bed Mobility Goal 1, PT) independent  -AV      Time Frame (Bed Mobility Goal 1, PT) 10 days  -AV       Row Name 08/17/23 1400          Transfer Goal 1 (PT)    Activity/Assistive Device (Transfer Goal 1, PT) sit-to-stand/stand-to-sit;bed-to-chair/chair-to-bed;walker, rolling  -AV     [place] Level/Cues Needed (Transfer Goal 1, PT) modified independence  -AV     Time Frame (Transfer Goal 1, PT) 10 days  -AV       Row Name 08/17/23 1400          Gait Training Goal 1 (PT)    Activity/Assistive Device (Gait Training Goal 1, PT) gait (walking locomotion);assistive device use;walker, rolling  -AV     [place] Level (Gait Training Goal 1, PT) modified independence  -AV     Distance (Gait Training Goal 1, PT) 120  -AV     Time Frame (Gait Training Goal 1, PT) 10 days  -AV               User Key  (r) = Recorded By, (t) = Taken By, (c) = Cosigned By      Initials Name Provider Type    Nay Coleman, RN Registered Nurse    Dave Ravi, PT Physical Therapist                    Physical Therapy Education       Title: PT OT SLP Therapies (In Progress)       Topic: Physical Therapy (In Progress)       Point: Mobility training (Done)       Learning Progress Summary             Patient Acceptance, E,TB, VU by AV at 8/17/2023 1412                         Point: Home exercise program (Not Started)       Learner Progress:  Not documented in this visit.              Point: Body mechanics (Done)       Learning Progress Summary             Patient Acceptance, E,TB, VU by AV at 8/17/2023 1412                         Point: Precautions (Done)       Learning Progress Summary             Patient Acceptance, E,TB, VU by AV at 8/17/2023 1412                                         User Key       Initials Effective Dates Name Provider Type Discipline     06/11/21 -  Dave Reynoso, PT Physical Therapist PT                  PT Recommendation and Plan  Anticipated Discharge Disposition (PT): home with home health  Planned Therapy Interventions (PT): balance  training, bed mobility training, gait training, home exercise program, neuromuscular re-education, strengthening, transfer training  Therapy Frequency (PT): daily  Plan of Care Reviewed With: patient  Progress: no change  Outcome Evaluation: Patient presents with deficits in balance, endurance, transfers, and ambulation. Patient will benefit from skilled PT services to address these mobility deficits and decrease risk of falls.   Outcome Measures       Row Name 08/17/23 1400             How much help from another person do you currently need...    Turning from your back to your side while in flat bed without using bedrails? 3  -AV      Moving from lying on back to sitting on the side of a flat bed without bedrails? 3  -AV      Moving to and from a bed to a chair (including a wheelchair)? 3  -AV      Standing up from a chair using your arms (e.g., wheelchair, bedside chair)? 3  -AV      Climbing 3-5 steps with a railing? 3  -AV      To walk in hospital room? 3  -AV      AM-PAC 6 Clicks Score (PT) 18  -AV         Functional Assessment    Outcome Measure Options AM-PAC 6 Clicks Basic Mobility (PT)  -AV                User Key  (r) = Recorded By, (t) = Taken By, (c) = Cosigned By      Initials Name Provider Type    AV Dave Reynoso, PT Physical Therapist                     Time Calculation:    PT Charges       Row Name 08/17/23 1411             Time Calculation    PT Received On 08/17/23  -AV      PT Goal Re-Cert Due Date 08/26/23  -AV         Untimed Charges    PT Eval/Re-eval Minutes 32  -AV         Total Minutes    Untimed Charges Total Minutes 32  -AV       Total Minutes 32  -AV                User Key  (r) = Recorded By, (t) = Taken By, (c) = Cosigned By      Initials Name Provider Type    AV Dave Reynoso, MEREDITH Physical Therapist                  Therapy Charges for Today       Code Description Service Date Service Provider Modifiers Qty    17063591606 HC PT EVAL LOW COMPLEXITY 3 8/17/2023 Theodore Martins  Dave, PT GP 1            PT G-Codes  Outcome Measure Options: AM-PAC 6 Clicks Basic Mobility (PT)  AM-PAC 6 Clicks Score (PT): 18  AM-PAC 6 Clicks Score (OT): 16    Dave Reynoso, PT  8/17/2023

## 2023-08-17 NOTE — CONSULTS
"Purpose of the visit was to evaluate for: goals of care/advanced care planning. Spoke with patient and discussed palliative care, goals of care, resuscitation status, and advanced care planning.      Assessment: Patient is currently on 3nt. Patient is alert and oriented to person, place, time and situation. No family at bedside during visit. Introduced self and explained role. Discussed patients current condition. Patient reports she has pain all over. Discussed advanced care planning. Patient shares she has completed a living will before and appointed [facility]. Patient states she will have her daughter, Melissa, who is her HCS bring a copy for our records. Discussed goals of care- patient reports she would like to do anything to get her better. Patient states she has grand babies she \"needs to live for.\" Discussed quality of life and repeat admissions. Provided education on COPD and chronic illness. Discussed code status- and provided education on full code versus dnr/code event. Patient reports she would like to discuss further with her daughter. Emotional support provided. Palliative care will continue to follow to support and assist.        Tasks Completed: Emotional Support.    Palliative care will continue to follow to support and assist.    Marianne BARON, RN, PN  Palliative Care      "

## 2023-08-17 NOTE — PROGRESS NOTES
[facility]     Progress Note    Patient Name: Sandra Sylvester  : 1945  MRN: [id number]  Primary Care Physician:  René Garrett MD  Date of admission: 8/15/2023      Subjective   Brief summary.  Admitted with increasing pleural effusion and pneumothorax/empyema      HPI:  Patient postthoracentesis, large amount of thick fluid drained.  Also thoracentesis on right performed and 900 cc drained.  Shortness of breath improving, patient hurting.    Review of Systems     No fever chills, shortness of breath, back pain.  No abdominal pain  Complains of weakness      Objective     Vitals:   Temp:  [97.3 øF (36.3 øC)-97.8 øF (36.6 øC)] 97.3 øF (36.3 øC)  Heart Rate:  [] 88  Resp:  [16-18] 16  BP: (100-119)/(46-83) 100/57  Flow (L/min):  [2-3] 2    Physical Exam :     Elderly female cachectic not in acute distress today.  Heart regular slightly tacky.  Lungs diminished breath sounds.  Chest tube on the left side.  Abdomen soft and scaphoid.  Extremities free of any edema, tenderness over the lumbar spine and paraspinal area and sacral area      Result Review:  I have personally reviewed the results from the time of this admission to 2023 17:19 EDT and agree with these findings:  [x]  Laboratory  []  Microbiology  []  Radiology  []  EKG/Telemetry   []  Cardiology/Vascular   []  Pathology  []  Old records  []  Other:           Assessment / Plan       Active Hospital Problems:  Active Hospital Problems    Diagnosis     Pneumothorax     Pleural effusion     Hypoxia     BMI less than 19,adult        Plan:   Patient post thoracentesis bilaterally and chest tube on the left now.  White milky drainage of 900 cc on the left and right done.  Questionable empyema versus chylous.  We will check lipase and amylase.  Discussed with intensivist.  Supportive care.  Palliative care on board to discuss CODE STATUS and goals of care.    DVT prophylaxis:  Medical DVT prophylaxis orders are present.    CODE STATUS:   Code  Status (Patient has no pulse and is not breathing): CPR (Attempt to Resuscitate)  Medical Interventions (Patient has pulse or is breathing): Full Support            Electronically signed by René Garrett MD, 08/17/23, 5:21 PM EDT.

## 2023-08-18 NOTE — PROGRESS NOTES
[facility]     Progress Note    Patient Name: Sandra Sylvester  : 1945  MRN: [id number]  Primary Care Physician:  René Garrett MD  Date of admission: 8/15/2023      Subjective   Brief summary.  Admitted with increasing pleural effusion and pneumothorax/empyema      HPI:  Patient with bilateral chylothorax.  Chest tube still draining large amounts  Complains of pain in the chest uncomfortable.      Review of Systems     Has any fever chills, poor appetite and poor p.o. intake  No abdominal pain  Complains of weakness      Objective     Vitals:   Temp:  [97.3 øF (36.3 øC)-97.9 øF (36.6 øC)] 97.7 øF (36.5 øC)  Heart Rate:  [] 105  Resp:  [12-18] 12  BP: (100-119)/(51-66) 100/51  Flow (L/min):  [2] 2    Physical Exam :     Elderly female cachectic not in acute distress today.  Heart regular slightly tacky.  Lungs diminished breath sounds.  Chest tube on the left side.    Abdomen soft nontender, extremities without any edema, skin with bruising      Result Review:  I have personally reviewed the results from the time of this admission to 2023 16:12 EDT and agree with these findings:  [x]  Laboratory  []  Microbiology  []  Radiology  []  EKG/Telemetry   []  Cardiology/Vascular   []  Pathology  []  Old records  []  Other:           Assessment / Plan       Active Hospital Problems:  Active Hospital Problems    Diagnosis     Pneumothorax     Pleural effusion     Hypoxia     BMI less than 19,adult        Plan:   Discussed with intensivist, patient with chylothorax, 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 intake  PT OT  Overall poor prognosis given patient's severe malnutrition    DVT prophylaxis:  Medical DVT prophylaxis orders are present.    CODE STATUS:   Code Status (Patient has no pulse and is not breathing): CPR (Attempt to Resuscitate)  Medical Interventions (Patient has pulse or is breathing): Full Support              Electronically signed by  René Garrett MD, 08/18/23, 4:18 PM EDT.

## 2023-08-18 NOTE — SIGNIFICANT NOTE
RN reported patient having some arm pain, questionable discoloration of the upper extremity on the left, reports she was sleeping on that side and had some pressure issues, daughter concerned, pulses intact, we will just proceed with arterial evaluation, patient not getting her Lovenox advised to restart.

## 2023-08-18 NOTE — CONSULTS
"Nutrition Services    Patient Name: Sandra Sylvester  YOB: 1945  MRN: [id number]  Admission date: 8/15/2023      CLINICAL NUTRITION ASSESSMENT      Reason for Assessment  Follow-up protocol     H&P:    Past Medical History:   Diagnosis Date    COPD (chronic obstructive pulmonary disease)     Hyperlipidemia     Hypertension         Current Problems:   Active Hospital Problems    Diagnosis     Pneumothorax     Pleural effusion     Hypoxia     BMI less than 19,adult         Nutrition/Diet History         Narrative     Patient admitted with pleural effusion. Followed by RD for BMI <18.5.  Patient has had significant weight loss over the last year. Severe muscle wasting and fat loss noted at time of physical exam on previous RD assessment.      Reviewed change to low-fat diet with daughter at bedside who voiced understanding.  Discussed need to discontinue Boost Plus and provide Boost Breeze to reduce overall fat intake related to chylothorax diagnosis.  Patient and daughter are in agreement.      Daughter voiced concern over patient's decreased appetite and decreased desire to eat/drink anything.  Briefly discussed nutrition support options.  Will monitor PO intake over the next few days and revisit to provide further nutrition interventions as indicated.       Anthropometrics        Current Height, Weight Height: 149.9 cm (59\")  Weight: 32.2 kg (71 lb)   Current BMI Body mass index is 14.34 kg/mý.       Weight Hx  Wt Readings from Last 30 Encounters:   08/15/23 1738 32.2 kg (71 lb)   08/15/23 1140 30.9 kg (68 lb 2 oz)   07/27/23 1254 29.6 kg (65 lb 4.1 oz)   07/20/23 0112 33.6 kg (74 lb 1.2 oz)   07/19/23 1850 34.1 kg (75 lb 2.8 oz)   01/13/23 0600 38.4 kg (84 lb 10.5 oz)   01/12/23 0555 37.6 kg (82 lb 14.3 oz)   01/11/23 0625 38.4 kg (84 lb 10.5 oz)   01/10/23 0452 38.9 kg (85 lb 12.1 oz)   01/10/23 0155 38.9 kg (85 lb 12.1 oz)   01/09/23 2025 42 kg (92 lb 9.5 oz)   09/01/22 1426 40.8 kg (90 lb) "   08/01/22 1431 40.8 kg (90 lb)   06/07/22 1016 40.8 kg (90 lb)   05/07/22 1924 40.9 kg (90 lb 2.7 oz)            Wt Change Observation -21% x 1 year      Estimated/Assessed Needs       Energy Requirements 35-40 kcal/kg   EST Needs (kcal/day) 8021-5237       Protein Requirements 1.8-2.0 g/kg   EST Daily Needs (g/day) 58-65       Fluid Requirements 30 ml/kg    Estimated Needs (mL/day) 1000     Labs/Medications         Pertinent Labs Reviewed.   Results from last 7 days   Lab Units 08/18/23  0438 08/17/23  0635 08/16/23  0457 08/15/23  1222   SODIUM mmol/L 134* 139 137 134*   POTASSIUM mmol/L 4.7 5.2 4.4 4.3   CHLORIDE mmol/L 101 108* 101 93*   CO2 mmol/L 21.7* 23.3 26.5 26.8   BUN mg/dL 27* 23 24* 27*   CREATININE mg/dL 0.74 0.66 0.69 0.73   CALCIUM mg/dL 10.4 10.3 9.7 10.4   BILIRUBIN mg/dL  --   --   --  0.7   ALK PHOS U/L  --   --   --  118*   ALT (SGPT) U/L  --   --   --  10   AST (SGOT) U/L  --   --   --  22   GLUCOSE mg/dL 83 103* 77 93       Results from last 7 days   Lab Units 08/18/23  0438   HEMOGLOBIN g/dL 12.3   HEMATOCRIT % 38.7       COVID19   Date Value Ref Range Status   08/17/2023 Not Detected Not Detected - Ref. Range Final     No results found for: HGBA1C      Pertinent Medications Reviewed.     Current Nutrition Orders & Evaluation of Intake       Oral Nutrition     Current PO Diet Diet: Gastrointestinal Diets; Fat-Restricted; Texture: Regular Texture (IDDSI 7); Fluid Consistency: Thin (IDDSI 0)   Supplement Orders Placed This Encounter      Dietary Nutrition Supplements Boost Plus (Ensure Plus); chocolate      Dietary Nutrition Supplements Boost Plus (Ensure Plus); vanilla       Malnutrition Severity Assessment      Patient meets criteria for : Severe Malnutrition           Nutrition Diagnosis         Nutrition Dx Problem 1 Severe malnutrition related to increased nutrient needs due to catabolic disease as evidenced by inadequate energy intake., unintended wt change., body composition changes.,  patient report., and report of minimal PO intake.       Nutrition Intervention         Boost Breeze TID   +750 kcal, 27 g pro  0 grams of fat provided per day by Boost Breeze.      Medical Nutrition Therapy/Nutrition Education          Learner     Readiness Patient and Family  Acceptance     Method     Response Explanation  Verbalizes understanding     Monitor/Evaluation        Monitor Per protocol, PO intake, Supplement intake, Pertinent labs, Weight       Nutrition Discharge Plan         Continue ONS TID       Electronically signed by:  Martha Rico RD  08/18/23 15:17 EDT

## 2023-08-18 NOTE — PLAN OF CARE
Goal Outcome Evaluation:           Progress: no change     Vss. Dilaudid once for pain. No other changes. Rested well. No acute changes. Will continue plan of care.

## 2023-08-18 NOTE — PROGRESS NOTES
Pulmonary / Critical Care Progress Note      Patient Name: Sandra Sylvester  : 1945  MRN: [id number]  Attending:  René Garrett MD  Date of admission: 8/15/2023    Subjective   Subjective   Follow-up for pleural effusions    Over past 24 hours:  Doing okay this morning  Currently on 2 L nasal cannula  300 cc output from chest tube over the last 24 hours  Pleural effusion consistent with chylothorax  Triglyceride 309, cholesterol 52  Lactic acid improving  Having some cough but overall doing better    Review of Systems  General: Denied complaints  Cardiovascular:  Denied complaints  Respiratory: +cough; Denied complaints  Gastrointestinal: Denied complaints        Objective   Objective     Vitals:   Temp:  [97.3 øF (36.3 øC)-97.9 øF (36.6 øC)] 97.5 øF (36.4 øC)  Heart Rate:  [82-99] 99  Resp:  [12-18] 12  BP: (100-119)/(53-66) 119/66  Flow (L/min):  [2] 2    Physical Exam   Vital Signs Reviewed   General:  WDWN, Alert, NAD. Elderly female, lying in bed  HEENT:  PERRL, EOMI.  OP, nares clear  Chest:  diminished L>R on auscultation bilaterally, no work of breathing noted on 2 L; right-sided chest tube to suction  CV: RRR, no MGR, pulses 2+, equal.  Abd:  Soft, NT, ND, + BS  EXT:  no clubbing, no cyanosis, no edema  Neuro:  A&Ox3, CN grossly intact, no focal deficits.  Skin: No rashes or lesions noted      Result Review    Result Review:  I have personally reviewed the results from the time of this admission to 2023 10:36 EDT and agree with these findings:  []  Laboratory  []  Microbiology  []  Radiology  []  EKG/Telemetry   []  Cardiology/Vascular   []  Pathology  []  Old records  []  Other:  Most notable findings include:   -     Assessment & Plan   Assessment / Plan     Active Hospital Problems:  Active Hospital Problems    Diagnosis     Pneumothorax     Pleural effusion     Hypoxia     BMI less than 19,adult          Impression:  Acute hypoxic respiratory failure  Chylothorax  Right-sided chest tube  placement 8/16 c/f empyema  COPD exacerbation  Lactic acidosis  Chronic smoker  Severe malnutrition      Plan:  -Currently on 2 L nasal cannula; continue to wean as tolerated keep SPO2 greater than 90%  -R Chest tube placed 8/16 for c/f empyema; total output 1.8L  -Status post L thoracentesis 8/17 with 800 cc milky fluid output.   -Fluid studies consistent with chylothorax; triglyceride 309, cholesterol 52, lymphocytic predominant fluid; Unclear etiology (malignancy?) but with damage to the thoracic duct, with leakage from the lymphatic system.  Continue to monitor output.  If continuous high output, may need interventional radiology versus surgery intervention for ligation of thoracic duct?   -For now recommend low-fat diet with medium chain TG.   -Will also start octreotide 50mcg subcutaneous q8hrs x 7 days in order to reduce the volume within the thoracic duct and inhibiting absorption from the intestine.    -Keep chest tube to -20 cc of water suction  -Continue broad-spectrum antibiotics for now; de-escalate based on cultures  -Continue diuresis as tolerated; monitor renal function electrolytes  -Continue nebs and bronchopulmonary hygiene  -Encourage I-S and flutter valve use  -Trend lactate until clearance  -Recommend PT/OT    DVT prophylaxis:  Medical DVT prophylaxis orders are present.    CODE STATUS:   Code Status (Patient has no pulse and is not breathing): CPR (Attempt to Resuscitate)  Medical Interventions (Patient has pulse or is breathing): Full Support    Labs, images, and medications personally reviewed.  Discussed with patient  Electronically signed by Kassandra Houser MD, 08/18/23, 10:45 AM EDT.

## 2023-08-18 NOTE — PLAN OF CARE
Goal Outcome Evaluation:  Plan of Care Reviewed With: patient, family         Right chest tube output 260 during this shift. Pt had left sided thoracentesis performed at bedside with output 900mL. Pt complaint of pain bilateral sides of trunk and reports relief after PRN Norco administered.

## 2023-08-19 NOTE — PLAN OF CARE
Goal Outcome Evaluation:  Plan of Care Reviewed With: patient, daughter, son           Outcome Evaluation: Patient presented today with ongoing pain, first in the back, later in the left arm. Pain medication administered gave little relief. She did not eat or drink anything today. Patients left arm turned pale in the afternoon with worsening pain, notified MD.

## 2023-08-19 NOTE — PLAN OF CARE
Goal Outcome Evaluation: Patient with contrast infiltration to right arm IV site, during CT, reported to this nurse per [place] in CT.  Protocol initiated, assisted by Leigh Ann  RN, see documentation.  Patient also started on Heparin drip this, per DVT protocol. Patient with prn pain meds x2 this shift, see emar.  Dr. Peter on floor to see patient this shift, see notes. Patient rested this shift, but was up more towards 3am and on. Patient with noted low bps 90's-100/40's-50s, pt asymptomatic.  Pt appetite and po liquid intake poor. Continue plan of care.

## 2023-08-19 NOTE — PROGRESS NOTES
Pulmonary / Critical Care Progress Note      Patient Name: Sandra Sylvester  : 1945  MRN: [id number]  Attending:  René Garrett MD  Date of admission: 8/15/2023    Subjective   Subjective   Follow-up for pleural effusions    Over past 24 hours:  Very weak, frail and bedbound  Had left upper extremity numbness and weakness.  CTA was found to have a stent within the proximal left subclavian artery that is occluded.  Vascular surgery discussed with the patient and family and she is a very poor candidate for surgery.  Currently on anticoagulation  Now DNR/DNI  Continued with increasing chest tube output.  Pleural effusion consistent with chylothorax.  Cytology pending.  Remains on octreotide and low-fat diet  Finishing steroids  Very Lethargic today, confused and not answering questions  Currently on 2 L nasal cannula  300 cc output from chest tube over the last 24 hours  Pleural effusion consistent with chylothorax  Triglyceride 309, cholesterol 52  Lactic acid improving  Having some cough but overall doing better    Review of Systems  Cannot obtain due to lethargy with altered mental status        Objective   Objective     Vitals:   Temp:  [97.5 øF (36.4 øC)-97.8 øF (36.6 øC)] 97.8 øF (36.6 øC)  Heart Rate:  [] 105  Resp:  [12] 12  BP: ()/(42-66) 96/54  Flow (L/min):  [2] 2    Physical Exam   Vital Signs Reviewed   General:  WDWN, Alert, NAD. Elderly female, lying in bed thin frail elderly female, laying in bed confused, groaning  Chest:  diminished L>R on auscultation bilaterally, no work of breathing noted on 2 L; right-sided chest tube to suction  CV: RRR, no MGR, pulses 2+, equal.  Abd:  Soft, NT, ND, + BS  EXT:  no clubbing, no cyanosis, no edema, muscle wasting noted x4 extremities, left upper extremity cold  Neuro:  A&Ox3, CN grossly intact, no focal deficits.  Skin: No rashes or lesions noted      Result Review    Result Review:  I have personally reviewed the results from the time of this  admission to 8/19/2023 07:03 EDT and agree with these findings:  [x]  Laboratory  [x]  Microbiology  [x]  Radiology  [x]  EKG/Telemetry   []  Cardiology/Vascular   []  Pathology  []  Old records  []  Other:  Most notable findings include:   Pleural fluid consistent with chylothorax.  Cytology and cultures pending  CT angiogram left upper extremity shows a proximal subclavian stent with thrombosis        Lab 08/18/23  0438 08/17/23  0635 08/16/23  0457 08/15/23  1222 08/15/23  1159   WBC 6.88 5.71 5.39  --  8.21   HEMOGLOBIN 12.3 12.4 11.6*  --  13.1   HEMATOCRIT 38.7 42.6 38.0  --  40.0   PLATELETS 276 298 252  --  304   SODIUM 134* 139 137 134*  --    POTASSIUM 4.7 5.2 4.4 4.3  --    CHLORIDE 101 108* 101 93*  --    CO2 21.7* 23.3 26.5 26.8  --    BUN 27* 23 24* 27*  --    CREATININE 0.74 0.66 0.69 0.73  --    GLUCOSE 83 103* 77 93  --    CALCIUM 10.4 10.3 9.7 10.4  --    TOTAL PROTEIN  --   --  4.8* 5.4*  --    ALBUMIN  --   --   --  2.8*  --    GLOBULIN  --   --   --  2.6  --          Assessment & Plan   Assessment / Plan     Active Hospital Problems:  Active Hospital Problems    Diagnosis     Pneumothorax     Pleural effusion     Hypoxia     BMI less than 19,adult      Impression:  Acute hypoxic respiratory failure  Chylothorax  Right-sided chest tube placement 8/16.  Findings consistent with chylothorax  Acute COPD exacerbation  Lactic acidosis  Chronic smoker  Severe protein calorie malnutrition with muscle wasting  Altered mental status  Toxic/metabolic encephalopathy  Arterial occlusion left upper extremity     Plan:  Wean O2 to keep SPO2 greater than 90%  Continue right-sided chest tube  We will have to discuss with family about possibly doing a doxycycline pleurodesis as I feel that pleural fluid will rapidly reaccumulate once we may remove right-sided chest tube.  We will try to discuss this with the POA to see if this is some they desire  I reviewed primary and vascular surgery's note.  Overall her  prognosis is poor.  Conservatively managing arterial occlusion left upper extremity  Ideally would like to get a CT of the chest/abdomen/pelvis to assess for occult mass or potential lymphoma causing this chylothorax.  We will also check peripheral blood flow cytometry.  Given the patient had a recent dye load yesterday, will defer at this time  Pleural fluid cytology pending.  Continue octreotide for chylothorax low-fat diet however p.o. intake is very poor  Discontinue antibiotics as there is no evidence of bacterial infection  Continue intermittent diuresis  Trend renal panel and electrolytes  Patient's overall prognosis is poor.  Patient is currently now DNR/DNI.  We will need to discuss with the patient's POA to see how aggressive they would like us to be in the evaluation and management of this patient's chylothorax    DVT prophylaxis:  Medical DVT prophylaxis orders are present.    CODE STATUS:   Code Status (Patient has no pulse and is not breathing): CPR (Attempt to Resuscitate)  Medical Interventions (Patient has pulse or is breathing): Full Support      Labs, imaging, microbiology, notes and medications personally reviewed  Discussed with primary    Electronically signed by King Ramirez MD, 08/19/23, 5:34 PM EDT.

## 2023-08-19 NOTE — CONSULTS
[facility]   VASCULAR SURGERY CONSULT    Patient Name: Sandra Sylvester  : 1945  MRN: [id number]  Primary Care Physician:  René Garrett MD  Date of admission: 8/15/2023    Subjective   Subjective     Chief Complaint: Left arm ischemia    HPI:    Sandra Sylvester is a 78 y.o. female with multiple comorbidities.  She is severely malnourished.  She has severe COPD and has been admitted to the hospital with recurrent shortness of breath and pleural effusion.  She was in the hospital for over 2 weeks recently and was just discharged  10 days ago.  During that admission she required treatment for pneumonia and pleural effusion with pleural tap.  She had recurrent effusion and it is now concerning for chylothorax.  She has a chest tube in place.  Sometime today she was noted to have pain in her left arm and discoloration.  Further work-up was performed.  She was noted to have ischemia on the left arm.  I was asked to see her for the same.  The patient is in and out of consciousness.  She is unable to provide me details of the history.  Her daughter is with her.  According to her the patient was walking until yesterday and was using her arm until this morning.  Initially it was suggested testicular but now it has been more pale.  Overall the patient's nutritional status has been poor.  According to the daughter she has been working with her at home and was able to get her from 64 pounds to 70 pounds after her discharge.  The patient has significant malnutrition for quite some time overall based on my review of the chart.    Review of Systems    As above unable to obtain due to patient's mental status    Personal History     Past Medical History:   Diagnosis Date    COPD (chronic obstructive pulmonary disease)     Hyperlipidemia     Hypertension        Past Surgical History:   Procedure Laterality Date    CHOLECYSTECTOMY         Family History: family history is not on file. Otherwise pertinent FHx was reviewed  and not pertinent to current issue.    Social History:  reports that she has quit smoking. Her smoking use included cigarettes. She smoked an average of .5 packs per day. She has never used smokeless tobacco. She reports that she does not currently use alcohol. She reports that she does not use drugs.    Home Medications:  Oyster Shell Calcium/D, albuterol sulfate HFA, atorvastatin, clobetasol, famotidine, furosemide, ipratropium-albuterol, metoprolol succinate XL, multivitamin with minerals, naproxen, and traZODone    Allergies:  Allergies   Allergen Reactions    Buspar [Buspirone] Delirium       Objective   Objective     Vitals:   Temp:  [97.3 øF (36.3 øC)-97.7 øF (36.5 øC)] 97.7 øF (36.5 øC)  Heart Rate:  [] 105  Resp:  [12-18] 12  BP: ()/(49-66) 93/49  Flow (L/min):  [2] 2    Physical Exam   Patient is in and out of sleep.  She is cachectic.  She has palpable right radial pulse.  No left radial pulse.  She does have a palpable left axillary pulse.  Left hand is in a contracture.  Is cool to touch.  She is unable to squeeze my hand.  Unknown sensory function.  She does complain of some pain when I moved her arm.  No pedal pulses.  Feet are otherwise warm.    Diagnostic studies: Noninvasive upper extremity study showed no flow in the left forearm.  Dampened proximal waveform.    Labs:      Results from last 7 days   Lab Units 08/18/23  0438 08/17/23  0635 08/16/23  0457   WBC 10*3/mm3 6.88 5.71 5.39   HEMOGLOBIN g/dL 12.3 12.4 11.6*   HEMATOCRIT % 38.7 42.6 38.0   PLATELETS 10*3/mm3 276 298 252         Results from last 7 days   Lab Units 08/18/23  0438 08/17/23  0635 08/16/23  0457   CREATININE mg/dL 0.74 0.66 0.69                  No results found for: LDL           Assessment & Plan   Assessment / Plan     Active Hospital Problems:  Active Hospital Problems    Diagnosis     Pneumothorax     Pleural effusion     Hypoxia     BMI less than 19,adult        Assessment/plan:   78-year-old female with  multiple comorbidities with possibly left arm ischemia.  Exact source is unclear.  Her echo yesterday had a 50% ejection fraction.  Detailed report is not available.  She has not been in atrial fibrillation.  This is possibly embolic occlusion versus subclavian artery thrombotic process that embolized distally.  In either case I think the overall prognosis for her is very poor.  I will get a CT to evaluate the exact level of occlusion and possibly the source.  I had a long discussion with the daughter and explained to her that she is a very high risk patient for any intervention.  Even if we intervene I am not sure that we are going to improve her quality of life.  With her contracture of the arm she likely has significant long-term neurological compromise.  The daughter is going to talk to the family about their decision.  I offered them the option of palliative care evaluation.  We will leave on the heparin drip for now.  We will discuss again after CT scan.        Electronically signed by Toni Trevino MD, 08/18/23, 8:23 PM EDT.

## 2023-08-19 NOTE — PLAN OF CARE
Goal Outcome Evaluation:              Outcome Evaluation: Pt resting comfortably. BP running low. Pt changed to DNR DNI. Pt given dilaudid for pain prn. Pt on heparin drip. Continue plan of care.

## 2023-08-19 NOTE — PROGRESS NOTES
[facility]     Progress Note    Patient Name: Sandra Sylvester  : 1945  MRN: [id number]  Primary Care Physician:  René Garrett MD  Date of admission: 8/15/2023      Assessment/Plan:    Patient was in CT.  An IV was attempted in the right arm with the IV team.  During contrast injection there was extravasation.  She has a skin tear from IV placement attempt the first time.  She is extremely fragile.  On the CT she has a proximal subclavian occlusion.  Her axillary is patent.  Radial artery actually has contrast flow in it.  She is collateralized.  There is likely a chronic subclavian lesion that ruptured or there is a large thrombus burden from embolic source.  At this point I do not think an aggressive intervention would be helpful especially with the risk of dislodging this thrombus and retrograde embolization.  I would recommend heparin drip.  Close monitoring.  I explained to the family that she is very high risk for any intervention at this time.  I told them that there is a likelihood that she would have deficit related to this but based on her current neuro exam, I do think she is can have deficit or even if she gets revascularized right away.  We will monitor her without any intervention for the time being.  Overall her prognosis is very poor.  Discussed with the medicine team.    Electronically signed by Toni Trevino MD, 23, 9:24 PM EDT.

## 2023-08-19 NOTE — PROGRESS NOTES
[facility]     Progress Note    Patient Name: Sandra Sylvester  : 1945  MRN: [id number]  Primary Care Physician:  René Garrett MD  Date of admission: 8/15/2023      Subjective   Brief summary.  Admitted with increasing pleural effusion and pneumothorax/empyema.  Further developed left upper extremity pain and ischemia      HPI:  Patient with bilateral chylothorax.  Chest tube still draining large amounts  Yesterday diagnosed with subclavian thrombus/plaque causing occlusion.  Vascular surgeon consulted last night and had detailed discussion with him as well as patient's family, not much intervention can be done, started on heparin.  Patient very lethargic this morning, not responding.  Left arm cold.  Breathing shallow.  Son at bedside, already spoke to daughter on phone      Review of Systems     No fever.  Decreased level of consciousness.  Moaning and groaning.  Left upper extremity cold.        Objective     Vitals:   Temp:  [97.6 øF (36.4 øC)-97.9 øF (36.6 øC)] 97.6 øF (36.4 øC)  Heart Rate:  [] 71  Resp:  [12-14] 14  BP: ()/(40-60) 80/40  Flow (L/min):  [2] 2    Physical Exam :     Elderly female sleepy, mostly unresponsive to verbal commands.  Cachectic and tired looking with some shortness of breath heart regular slightly tacky.  Lungs diminished breath sounds.  Chest tube on the left side.    Abdomen soft nontender, extremities without any edema, left upper extremity cold to touch and peripheral pulses not felt .      Result Review:  I have personally reviewed the results from the time of this admission to 2023 13:27 EDT and agree with these findings:  [x]  Laboratory  []  Microbiology  []  Radiology  []  EKG/Telemetry   []  Cardiology/Vascular   []  Pathology  []  Old records  []  Other:    Reviewed labs reviewed CTA and imaging studies      Assessment / Plan       Active Hospital Problems:  Active Hospital Problems    Diagnosis     Pneumothorax     Pleural effusion      Hypoxia     BMI less than 19,adult        Plan:     Patient with acute arterial occlusion of left subclavian.  Heparin initiated.  Not a surgical candidate.  Chylothorax persist.  Respiratory status and mental status deteriorating.  Patient critically ill.  I had detailed discussion with patient's family yesterday evening and today approximately 2 hours spend in total working on patient's diagnosis, discussion with consultants and family members.  Melissa, patient's daughter is the power of , had detailed discussion and agreeable for DNR/DNI status at this point.  Extremely poor prognosis.  Recommends comfort care but we will continue medical treatment per her request for now.  Discussed with consultants, both concluded that patient is terminally ill and difficult to resuscitate at this point..    Check labs in a.m.    DVT prophylaxis:  Medical DVT prophylaxis orders are present.    CODE STATUS:   Medical Intervention Limits: NO intubation (DNI)  Code Status (Patient has no pulse and is not breathing): No CPR (Do Not Attempt to Resuscitate)  Medical Interventions (Patient has pulse or is breathing): Limited Support      .Total  time spent in patient care, approximately 55..minutes.  I personally saw and examined the patient. I have reviewed all diagnostic interpretations including labs and x-rays, also I have reviewed treatment plans as written. I was present for care of my patient, time includes patient management by me, time spent at the patients bedside/exam,  time to review lab and imaging results, discussing patient care with nursing staff, consultants and documentation in the medical record.  Also additional time spent with family or caregiver discussing patient's condition as well as discharge planning.           Electronically signed by René Garrett MD, 08/19/23, 1:32 PM EDT.

## 2023-08-19 NOTE — PROGRESS NOTES
[facility]     Progress Note    Patient Name: Sandra Sylvester  : 1945  MRN: [id number]  Primary Care Physician:  René Garrett MD  Date of admission: 8/15/2023    Subjective   Subjective     Patient is more somnolent.  One of the sons is at bedside.  Not responding to commands.    Objective   Objective     Vitals:   Temp:  [97.6 øF (36.4 øC)-97.8 øF (36.6 øC)] 97.8 øF (36.6 øC)  Heart Rate:  [] 95  Resp:  [12] 12  BP: ()/(42-60) 90/52  Flow (L/min):  [2] 2    Labs     Results from last 7 days   Lab Units 23  0438 23  0635 23  0457   WBC 10*3/mm3 6.88 5.71 5.39   HEMOGLOBIN g/dL 12.3 12.4 11.6*   HEMATOCRIT % 38.7 42.6 38.0   PLATELETS 10*3/mm3 276 298 252         Results from last 7 days   Lab Units 23  0438 23  0635 23  0457   CREATININE mg/dL 0.74 0.66 0.69         Results from last 7 days   Lab Units 23  2309   INR  1.25*            No results found for: LDL           Physical Exam   Left hand actually has elevated better color than yesterday.  Improved cap refill.  Cyanosis.  Overall clinical condition is much worse.        Assessment & Plan   Assessment / Plan     Active Hospital Problems:  Active Hospital Problems    Diagnosis     Pneumothorax     Pleural effusion     Hypoxia     BMI less than 19,adult          Assessment/Plan:    I would not recommend any intervention for her at this time for her thrombus.  Continue heparin.  Her prognosis is poor.  Please call with questions    Electronically signed by Toni Trevino MD, 23, 12:02 PM EDT.

## 2023-08-20 NOTE — DISCHARGE SUMMARY
[facility]         DISCHARGE SUMMARY    Patient Name: Sandra Sylvester  : 1945  MRN: [id number]    Date of Admission: 8/15/2023  Date of Discharge: 2023, patient .  Time of death 4:52 AM  Primary Care Physician: René Garrett MD    Consults       Date and Time Order Name Status Description    2023  8:05 PM Inpatient Vascular Surgery Consult Completed     8/15/2023  5:01 PM Inpatient Pulmonology Consult Completed             Presenting Problem:   Pleural effusion [J90]  Pneumothorax, unspecified type [J93.9]    Chylothorax  Subclavian artery thrombosis    Active and Resolved Hospital Problems:  Active Hospital Problems    Diagnosis POA    Pneumothorax [J93.9] Yes    Pleural effusion [J90] Yes    Hypoxia [R09.02] Yes    BMI less than 19,adult [Z68.1] Not Applicable      Resolved Hospital Problems   No resolved problems to display.         Hospital Course     Hospital Course:  Sandra Sylvester is a 78 y.o. female unfortunate female who was in hospital few weeks ago when with a diagnosis of pneumonia and pleural effusion was sent home and admitted back again for increasing fluids.  Patient has bilateral pleural effusion and symptoms of shortness of breath.    Again pulmonologist was consulted they performed thoracentesis at this time the fluid looks milky white and initially diagnosed as empyema further investigations and testing proved it to be lymph.  Patient was placed on a chest tube on the left side and right-sided drainage was done to thoracentesis.    She was slowly improving but suddenly she developed left upper extremity pain and discoloration.  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  Vascular surgeon was consulted, considering patient's overall health situation with malnourished status bilateral pleural effusion and hypoxia and extremely ill  patient was not a candidate for  any surgery.  She was started on heparin.  Patient remained in pain blood pressure dropped her extremity remained cold.  Consultants vascular surgeon and intensivist again reevaluated patient and felt like patient cannot be operated on and conservative management was recommended.  I spoke to patient's daughter and son in detail explained the patient's critical and  Reviewing her advanced directive we made her DNR/DNI.  Patient's family, daughter and sons was present most of the time and they understood that patient is terminal.  Plan was to change her to comfort care from morning today.  Patient got worse overnight and  early this morning.            Day of Discharge         Pertinent  and/or Most Recent Results     LAB RESULTS:      Lab 23  0308 23  0005 23  1655 23  1422 23  2309 23  0438 23  0049 23  0635 23  0016 23  2150 23  1916 23  1617 23  0457 08/15/23  1159   WBC  --   --   --   --   --  6.88  --  5.71  --   --   --   --  5.39 8.21   HEMOGLOBIN  --   --   --   --   --  12.3  --  12.4  --   --   --   --  11.6* 13.1   HEMATOCRIT  --   --   --   --   --  38.7  --  42.6  --   --   --   --  38.0 40.0   PLATELETS  --   --   --   --   --  276  --  298  --   --   --   --  252 304   NEUTROS ABS  --   --   --   --   --  5.43  --  4.22  --   --   --   --  4.07 6.73   IMMATURE GRANS (ABS)  --   --   --   --   --  0.04  --  0.03  --   --   --   --  0.01 0.02   LYMPHS ABS  --   --   --   --   --  0.49*  --  0.41*  --   --   --   --  0.35* 0.43*   MONOS ABS  --   --   --   --   --  0.87  --  0.70  --   --   --   --  0.74 0.87   EOS ABS  --   --   --   --   --  0.03  --  0.29  --   --   --   --  0.18 0.12   MCV  --   --   --   --   --  100.3*  --  109.2*  --   --   --   --  100.5* 97.1*   LACTATE  --   --   --   --   --   --  3.0* 3.2* 3.5* 3.0* 3.7*   < >  --   --    LDH  --   --   --   --   --   --   --   --   --   --   --   --  569*  --     PROTIME  --   --   --   --  15.8*  --   --   --   --   --   --   --   --   --    APTT 131.1* 191.6* 97.2* >200.0* 31.6*  --   --   --   --   --   --   --   --   --     < > = values in this interval not displayed.         Lab 08/18/23  0438 08/17/23  0635 08/16/23  0457 08/15/23  1222   SODIUM 134* 139 137 134*   POTASSIUM 4.7 5.2 4.4 4.3   CHLORIDE 101 108* 101 93*   CO2 21.7* 23.3 26.5 26.8   ANION GAP 11.3 7.7 9.5 14.2   BUN 27* 23 24* 27*   CREATININE 0.74 0.66 0.69 0.73   EGFR 81.9 88.8 87.9 83.3   GLUCOSE 83 103* 77 93   CALCIUM 10.4 10.3 9.7 10.4         Lab 08/16/23  0457 08/15/23  1222   TOTAL PROTEIN 4.8* 5.4*   ALBUMIN  --  2.8*   GLOBULIN  --  2.6   ALT (SGPT)  --  10   AST (SGOT)  --  22   BILIRUBIN  --  0.7   ALK PHOS  --  118*         Lab 08/18/23  2309 08/15/23  1222   PROBNP  --  913.9   HSTROP T  --  28*   PROTIME 15.8*  --    INR 1.25*  --                  Brief Urine Lab Results  (Last result in the past 365 days)        Color   Clarity   Blood   Leuk Est   Nitrite   Protein   CREAT   Urine HCG        01/09/23 2025 Orange   Cloudy   --  Comment: Result not available due to interfering substances.   --  Comment: Result not available due to interfering substances.   --  Comment: Result not available due to interfering substances.   --  Comment: Result not available due to interfering substances.                 Microbiology Results (last 10 days)       Procedure Component Value - Date/Time    Anaerobic Culture - Pleural Fluid, Pleural Cavity [[id number]]  (Normal) Collected: 08/17/23 1233    Lab Status: Preliminary result Specimen: Pleural Fluid from Pleural Cavity Updated: 08/20/23 0835     Anaerobic Culture No anaerobes isolated at 3 days    AFB Culture - Body Fluid, Pleural Cavity [[id number]] Collected: 08/17/23 1220    Lab Status: Preliminary result Specimen: Body Fluid from Pleural Cavity Updated: 08/17/23 5575     AFB Stain No acid fast bacilli seen    Body Fluid Culture - Body Fluid, Pleural  Cavity [[id number]] Collected: 08/17/23 1220    Lab Status: Final result Specimen: Body Fluid from Pleural Cavity Updated: 08/20/23 0651     Body Fluid Culture No growth at 3 days     Gram Stain No organisms seen    Respiratory Panel PCR w/COVID-19(SARS-CoV-2) [facility] In-House, NP Swab in UTM/VTM, 3-4 HR TAT - Swab, Nasopharynx [[id number]]  (Normal) Collected: 08/17/23 1001    Lab Status: Final result Specimen: Swab from Nasopharynx Updated: 08/17/23 1147     ADENOVIRUS, PCR Not Detected     Coronavirus 229E Not Detected     Coronavirus HKU1 Not Detected     Coronavirus NL63 Not Detected     Coronavirus OC43 Not Detected     COVID19 Not Detected     Human Metapneumovirus Not Detected     Human Rhinovirus/Enterovirus Not Detected     Influenza A PCR Not Detected     Influenza B PCR Not Detected     Parainfluenza Virus 1 Not Detected     Parainfluenza Virus 2 Not Detected     Parainfluenza Virus 3 Not Detected     Parainfluenza Virus 4 Not Detected     RSV, PCR Not Detected     Bordetella pertussis pcr Not Detected     Bordetella parapertussis PCR Not Detected     Chlamydophila pneumoniae PCR Not Detected     Mycoplasma pneumo by PCR Not Detected    Narrative:      In the setting of a positive respiratory panel with a viral infection PLUS a negative procalcitonin without other underlying concern for bacterial infection, consider observing off antibiotics or discontinuation of antibiotics and continue supportive care. If the respiratory panel is positive for atypical bacterial infection (Bordetella pertussis, Chlamydophila pneumoniae, or Mycoplasma pneumoniae), consider antibiotic de-escalation to target atypical bacterial infection.    Blood Culture - Blood, Arm, Left [[id number]]  (Normal) Collected: 08/16/23 1617    Lab Status: Preliminary result Specimen: Blood from Arm, Left Updated: 08/19/23 1646     Blood Culture No growth at 3 days    Blood Culture - Blood, Arm, Right [[id number]]  (Normal)  Collected: 08/16/23 1617    Lab Status: Preliminary result Specimen: Blood from Arm, Right Updated: 08/19/23 1646     Blood Culture No growth at 3 days    AFB Culture - Body Fluid, Pleural Cavity [[id number]] Collected: 08/16/23 1450    Lab Status: Preliminary result Specimen: Body Fluid from Pleural Cavity Updated: 08/17/23 0915     AFB Stain No acid fast bacilli seen on direct smear    Body Fluid Culture - Body Fluid, Pleural Cavity [[id number]] Collected: 08/16/23 1450    Lab Status: Final result Specimen: Body Fluid from Pleural Cavity Updated: 08/19/23 0845     Body Fluid Culture No growth at 3 days     Gram Stain Occasional WBCs seen      No organisms seen            PROCEDURES:    CT Chest Without Contrast Diagnostic    Result Date: 7/27/2023  Impression:   1. Right-sided chest tube within the anterior basilar aspect of the pleural space.  Trace residual right-sided pleural effusion is present within the posterior right pleural space.  2. Small left-sided pleural effusion with associated left basilar compressive atelectasis.  3. Moderate centrilobular emphysema.  Central bronchial wall thickening which may indicate chronic bronchitis. 4. Chronic compression fractures of the lower thoracic and upper lumbar spine. 5. Circumscribed low-density mass within the right axillary region, favored to represent a benign skin lesions such as an epidermal inclusion cyst or sebaceous cyst.  Correlate clinically with exam.        CHARISSE HERNANDEZ MD       Electronically Signed and Approved By: CHARISSE HERNANDEZ MD on 7/27/2023 at 19:57             CT Chest Without Contrast Diagnostic    Result Date: 7/22/2023  Impression:   1. There is a very large right-sided pleural effusion, which is probably loculated.  There is associated deformity of the right lung, which may represent atelectasis.  Pneumonia cannot be excluded.  2. A small-to-moderate-sized probably loculated left pleural effusion is seen also.  3. Extensive  atherosclerotic changes are noted.  4. There may be anasarca.  5. No cardiac enlargement.  6. There may be prominent reactive mediastinal lymph nodes.  7. There is a nearly 3 cm cystic mass involving the right axilla which may represent some type of benign cyst, such as a seroma.  Suppurative or necrotic adenopathy cannot be excluded but is (are) probably less likely.  8. There are several vertebral compression fractures, which are age-indeterminate but thought most likely to be chronic in nature, including, but not necessarily limited to, C6, T4, T5, T10, and L1.  9. Please see above comments for further detail   1.   Please note that portions of this note were completed with a voice recognition program.  ADIS GRAVES JR, MD       Electronically Signed and Approved By: ADIS GRAVES JR, MD on 7/22/2023 at 0:32              CT Angiogram Upper Extremity Left    Result Date: 8/18/2023  Impression:   1. Stent within proximal left subclavian artery is occluded, with reconstitution distally likely secondary to retrograde flow from the left vertebral artery, suggesting left subclavian steal phenomenon. 2. Moderate partially loculated left pleural effusion with left basilar atelectasis. 3. Several hypodense foci within left iliacus and left gluteal muscles, nonspecific.  Correlate for any symptoms that would suggest intramuscular abscesses or hematomas.     IVETH PARRY MD       Electronically Signed and Approved By: IVETH PARRY MD on 8/18/2023 at 23:48             XR Chest 1 View    Result Date: 8/17/2023  Impression:   1. Stable right chest tube position with trace residual effusion and persistent right basilar opacities. 2. Significantly improved left pleural effusion.       ANTHONY BALLARD MD       Electronically Signed and Approved By: ANTHONY BALLARD MD on 8/17/2023 at 12:51             XR Chest 1 View    Result Date: 8/16/2023  Impression:   1. Right thoracotomy tube with improving pleural effusion.  No  appreciable pneumothorax. 2. Increasing small to moderate left pleural effusion. 3. Patchy bibasilar airspace opacities which may reflect atelectasis or infiltrate.       ANTHONY BALLARD MD       Electronically Signed and Approved By: ANTHONY BALLARD MD on 8/16/2023 at 16:30             XR Chest 1 View    Result Date: 8/15/2023  Impression:   1. Small pneumothorax right apical area. 2.  Moderate right pleural effusion with airspace disease that may relate to atelectasis within the right lung. 3. Lucency left apical area without definitive pleural margin confirmed to suggest pneumothorax. 4. Left pleural effusion with probable left basilar atelectasis or infiltrate.       LATONYA DEGROOT MD       Electronically Signed and Approved By: LATONYA DEGROOT MD on 8/15/2023 at 11:58             XR Chest 1 View    Result Date: 7/31/2023  Impression:   1. Stable to slight increase in pleural parenchymal changes at the left base 2. Increased focal consolidation right lung base with associated small pleural effusion. 3. No definite pneumothorax identified of on slightly limited imaging.       LUANNE BONILLA MD       Electronically Signed and Approved By: LUANNE BONILLA MD on 7/31/2023 at 8:26             XR Chest 1 View    Result Date: 7/26/2023  Impression:   1. Right-sided chest tube in place with a trace amount of pleural fluid suspected at the right base.  No definite pneumothorax is identified at this time 2. Small to moderate left-sided pleural effusion 3. Suspected mild interstitial edema       LUANNE BONILLA MD       Electronically Signed and Approved By: LUANNE BONILLA MD on 7/26/2023 at 14:51             XR Chest 1 View    Result Date: 7/25/2023  Impression:   1. At least a small right hydropneumothorax with thoracostomy tube in place. 2. Findings of probable mild interstitial edema, small left effusion and bibasilar opacities favoring atelectasis.       ANTHONY BALLARD MD       Electronically Signed and Approved By:  ANTHONY BALLARD MD on 7/25/2023 at 16:05             XR Chest PA & Lateral    Result Date: 8/17/2023  Impression:   1. Right-sided chest tube in place.  No pneumothorax identified. 2. Moderate to large left pleural effusion. 3. Bibasilar opacities, which could reflect atelectasis or pneumonia.     IVETH PARRY MD       Electronically Signed and Approved By: IVETH PARRY MD on 8/17/2023 at 7:01              Results for orders placed during the hospital encounter of 08/15/23    Doppler Arterial Multi Level Upper Extremity - Bilateral CAR    Interpretation Summary    Normal arterial pressures on the right.    Severe arterial ischemia on the left. Severe digital ischemia noted on the left. Left digital ischemia is located in the 1st, 2nd, 3rd, 4th and 5th digit.      Results for orders placed during the hospital encounter of 08/15/23    Doppler Arterial Multi Level Upper Extremity - Bilateral CAR    Interpretation Summary    Normal arterial pressures on the right.    Severe arterial ischemia on the left. Severe digital ischemia noted on the left. Left digital ischemia is located in the 1st, 2nd, 3rd, 4th and 5th digit.      Results for orders placed during the hospital encounter of 08/15/23    Adult Transthoracic Echo Complete W/ Cont if Necessary Per Protocol    Interpretation Summary    Left ventricular ejection fraction appears to be 51 - 55%.    Left ventricular diastolic function is consistent with (grade I) impaired relaxation.    Moderate to severe tricuspid valve regurgitation is present.    Estimated right ventricular systolic pressure from tricuspid regurgitation is mildly elevated (35-45 mmHg).    There were no apparent intracardiac masses or thrombi.      Labs Pending at Discharge:  Pending Labs       Order Current Status    Flow Cytometry In process    Fungus Culture - Body Fluid, Pleural Cavity In process    Fungus Culture - Body Fluid, Pleural Cavity In process    Non-gynecologic Cytology  In process    AFB Culture - Body Fluid, Pleural Cavity Preliminary result    AFB Culture - Body Fluid, Pleural Cavity Preliminary result    Anaerobic Culture - Pleural Fluid, Pleural Cavity Preliminary result    Blood Culture - Blood, Arm, Left Preliminary result    Blood Culture - Blood, Arm, Right Preliminary result                      Discharge Disposition:                      No future appointments.        Time spent on Discharge including face to face service: 30 minutes.            I have dictated this note utilizing Dragon Dictation.             Please note that portions of this note were completed with a voice recognition program.             Part of this note may be an electronic transcription/translation of spoken language to printed text         using the Dragon Dictation System.       Electronically signed by René Garrett MD, 23, 11:24 AM EDT.

## 2023-08-21 LAB
BACTERIA SPEC AEROBE CULT: NORMAL
BACTERIA SPEC AEROBE CULT: NORMAL

## 2023-08-22 LAB
BACTERIA SPEC ANAEROBE CULT: NORMAL
CYTO UR: NORMAL
FUNGUS WND CULT: NORMAL
LAB AP CASE REPORT: NORMAL
LAB AP CLINICAL INFORMATION: NORMAL
Lab: NORMAL
PATH REPORT.FINAL DX SPEC: NORMAL
PATH REPORT.GROSS SPEC: NORMAL

## 2023-08-29 LAB
MYCOBACTERIUM SPEC CULT: NORMAL
NIGHT BLUE STAIN TISS: NORMAL

## 2023-08-30 LAB
FUNGUS WND CULT: NORMAL
MYCOBACTERIUM SPEC CULT: NORMAL
NIGHT BLUE STAIN TISS: NORMAL

## 2023-08-31 LAB
FUNGUS WND CULT: NORMAL
MYCOBACTERIUM SPEC CULT: NORMAL
NIGHT BLUE STAIN TISS: NORMAL

## 2023-09-05 LAB
MYCOBACTERIUM SPEC CULT: NORMAL
NIGHT BLUE STAIN TISS: NORMAL

## 2023-09-06 LAB
FUNGUS WND CULT: NORMAL
MYCOBACTERIUM SPEC CULT: NORMAL
NIGHT BLUE STAIN TISS: NORMAL

## 2023-09-07 LAB
FUNGUS WND CULT: NORMAL
MYCOBACTERIUM SPEC CULT: NORMAL
NIGHT BLUE STAIN TISS: NORMAL

## 2023-09-13 LAB
FUNGUS WND CULT: NORMAL
MYCOBACTERIUM SPEC CULT: NORMAL
NIGHT BLUE STAIN TISS: NORMAL

## 2023-09-14 LAB
FUNGUS WND CULT: NORMAL
MYCOBACTERIUM SPEC CULT: NORMAL
NIGHT BLUE STAIN TISS: NORMAL

## 2023-09-20 LAB
MYCOBACTERIUM SPEC CULT: NORMAL
NIGHT BLUE STAIN TISS: NORMAL

## 2023-09-21 LAB
MYCOBACTERIUM SPEC CULT: NORMAL
NIGHT BLUE STAIN TISS: NORMAL

## 2023-09-27 LAB
MYCOBACTERIUM SPEC CULT: NORMAL
NIGHT BLUE STAIN TISS: NORMAL

## 2023-09-28 LAB
MYCOBACTERIUM SPEC CULT: NORMAL
NIGHT BLUE STAIN TISS: NORMAL